# Patient Record
Sex: MALE | Race: BLACK OR AFRICAN AMERICAN | Employment: OTHER | URBAN - METROPOLITAN AREA
[De-identification: names, ages, dates, MRNs, and addresses within clinical notes are randomized per-mention and may not be internally consistent; named-entity substitution may affect disease eponyms.]

---

## 2017-01-17 ENCOUNTER — PATIENT OUTREACH (OUTPATIENT)
Dept: FAMILY MEDICINE CLINIC | Facility: CLINIC | Age: 42
End: 2017-01-17

## 2017-01-17 NOTE — PROGRESS NOTES
Patient was admitted to [facility] Emergency Department on 12/6/16 -12/6/16 for blood in urine. Contacted patient for hospital follow up. Introduced self, role and reason for call. Verified 2 patient identifiers (Name and Date of Birth). Noted no hospitalization  admission post 30 days from discharge date above. This episode is closed. ED/Hospital episode resolved. Patient stated that he is doing good. No S/S of bleeding per patient. Patient declined follow up appointment. Patient voiced no concern, needs, and/or assistance at this time. Opportunity to ask questions was provided. Contact information was provided for future reference, assistance, concerns, or further questions.

## 2017-02-02 ENCOUNTER — HOSPITAL ENCOUNTER (OUTPATIENT)
Dept: LAB | Age: 42
Discharge: HOME | End: 2017-02-02

## 2017-02-02 LAB — SENTARA SPECIMEN COL,SENBCF: NORMAL

## 2017-02-02 PROCEDURE — 99001 SPECIMEN HANDLING PT-LAB: CPT | Performed by: INTERNAL MEDICINE

## 2017-03-13 VITALS
TEMPERATURE: 98.6 F | WEIGHT: 250 LBS | BODY MASS INDEX: 35.87 KG/M2 | RESPIRATION RATE: 17 BRPM | HEART RATE: 94 BPM | OXYGEN SATURATION: 96 % | SYSTOLIC BLOOD PRESSURE: 166 MMHG | DIASTOLIC BLOOD PRESSURE: 107 MMHG

## 2017-03-13 PROCEDURE — 99283 EMERGENCY DEPT VISIT LOW MDM: CPT

## 2017-03-14 ENCOUNTER — HOSPITAL ENCOUNTER (EMERGENCY)
Age: 42
Discharge: HOME | End: 2017-03-14
Attending: EMERGENCY MEDICINE
Payer: MEDICAID

## 2017-03-14 DIAGNOSIS — R03.0 ELEVATED BLOOD PRESSURE READING WITHOUT DIAGNOSIS OF HYPERTENSION: ICD-10-CM

## 2017-03-14 DIAGNOSIS — R51.9 LEFT FACIAL PAIN: Primary | ICD-10-CM

## 2017-03-14 DIAGNOSIS — K08.89 PAIN, DENTAL: ICD-10-CM

## 2017-03-14 PROCEDURE — 74011250637 HC RX REV CODE- 250/637: Performed by: EMERGENCY MEDICINE

## 2017-03-14 RX ORDER — CLINDAMYCIN HYDROCHLORIDE 300 MG/1
300 CAPSULE ORAL 4 TIMES DAILY
Qty: 28 CAP | Refills: 0 | Status: SHIPPED | OUTPATIENT
Start: 2017-03-14 | End: 2017-03-21

## 2017-03-14 RX ORDER — IBUPROFEN 400 MG/1
800 TABLET ORAL
Status: COMPLETED | OUTPATIENT
Start: 2017-03-14 | End: 2017-03-14

## 2017-03-14 RX ORDER — ACETAMINOPHEN 500 MG
1000 TABLET ORAL
Qty: 50 TAB | Refills: 0 | Status: SHIPPED | OUTPATIENT
Start: 2017-03-14 | End: 2019-03-24

## 2017-03-14 RX ORDER — ACETAMINOPHEN 500 MG
1000 TABLET ORAL
Status: COMPLETED | OUTPATIENT
Start: 2017-03-14 | End: 2017-03-14

## 2017-03-14 RX ORDER — IBUPROFEN 800 MG/1
800 TABLET ORAL
Qty: 30 TAB | Refills: 0 | Status: SHIPPED | OUTPATIENT
Start: 2017-03-14 | End: 2017-03-19

## 2017-03-14 RX ORDER — CLINDAMYCIN HYDROCHLORIDE 150 MG/1
300 CAPSULE ORAL
Status: COMPLETED | OUTPATIENT
Start: 2017-03-14 | End: 2017-03-14

## 2017-03-14 RX ADMIN — ACETAMINOPHEN 1000 MG: 500 TABLET ORAL at 01:51

## 2017-03-14 RX ADMIN — CLINDAMYCIN HYDROCHLORIDE 300 MG: 150 CAPSULE ORAL at 01:51

## 2017-03-14 RX ADMIN — IBUPROFEN 800 MG: 400 TABLET, FILM COATED ORAL at 01:51

## 2017-03-14 NOTE — ED PROVIDER NOTES
HPI Comments: Ministerio Burnett is a 39 y.o. Male with c/o left sided lower dental pain for last day. No swelling, diff swallowing. Drinking cold water makes it better. No fcs. Constant throbbing. Still taking methadone. The history is provided by the patient. Past Medical History:   Diagnosis Date    Anxiety 10/11/2016    Continuous nicotine dependence 10/11/2016    Depression     Headache     [address] C w/o coma, chronic ([facility] [address] 10/11/2016    Hepatitis C     Insomnia 10/11/2016    Obesity, Class II, BMI 35-39.9 ([facility] [address] 10/11/2016    Paranoid schizophrenia ([facility] [address]     Psychotic disorder     [facility] 10/11/2016       Past Surgical History:   Procedure Laterality Date    HX APPENDECTOMY  2000         Family History:   Problem Relation Age of Onset    Cancer Mother 36     colon    Cancer Maternal Aunt      breast    Diabetes Father        Social History     Social History    Marital status: LEGALLY      Spouse name: N/A    Number of children: N/A    Years of education: N/A     Occupational History    Not on file. Social History Main Topics    Smoking status: Current Every Day Smoker     Packs/day: 0.00     Years: 10.00    Smokeless tobacco: Never Used      Comment: vapor (18mg nicotine)    Alcohol use No    Drug use: No      Comment: [facility]    Sexual activity: Yes     Partners: Female     Other Topics Concern    Not on file     Social History Narrative         ALLERGIES: Pcn [penicillins]    Review of Systems   Constitutional: Negative for fever. HENT: Positive for dental problem. Negative for sneezing, sore throat and trouble swallowing. Respiratory: Negative for cough. Cardiovascular: Negative for chest pain. Gastrointestinal: Negative for abdominal pain. Musculoskeletal: Negative for gait problem. Skin: Negative for color change. Neurological: Negative for syncope. Psychiatric/Behavioral: Positive for sleep disturbance. Vitals:    03/13/17 2304   BP: (!) 166/107   Pulse: 94   Resp: 17   Temp: 98.6 °F (37 °C)   SpO2: 96%   Weight: 113.4 kg (250 lb)            Physical Exam   Constitutional: He is oriented to person, place, and time. Non-toxic appearance. He does not appear ill. He appears distressed (appears mildly uncomfortable). HENT:   Head: Normocephalic and atraumatic. Right Ear: External ear normal.   Left Ear: External ear normal.   Nose: Nose normal.   Mouth/Throat: Uvula is midline, oropharynx is clear and moist and mucous membranes are normal. No oral lesions. Abnormal dentition. Dental abscesses and dental caries present. No uvula swelling. No oropharyngeal exudate, posterior oropharyngeal edema, posterior oropharyngeal erythema or tonsillar abscesses. Eyes: Conjunctivae are normal.   Neck: Normal range of motion. Cardiovascular: Normal rate, regular rhythm, normal heart sounds and intact distal pulses. Pulmonary/Chest: Effort normal and breath sounds normal. No respiratory distress. Abdominal: Soft. There is no tenderness. Musculoskeletal: Normal range of motion. He exhibits no edema. Neurological: He is alert and oriented to person, place, and time. Skin: Skin is warm and dry. He is not diaphoretic. Psychiatric: His behavior is normal.   Nursing note and vitals reviewed. [facility]  ED Course       Procedures  Vitals:  Patient Vitals for the past 12 hrs:   Temp Pulse Resp BP SpO2   03/13/17 2304 98.6 °F (37 °C) 94 17 (!) 166/107 96 %         Medications ordered:   Medications   ibuprofen (MOTRIN) tablet 800 mg (not administered)   acetaminophen (TYLENOL) tablet 1,000 mg (not administered)   clindamycin (CLEOCIN) capsule 300 mg (not administered)         Lab findings:  No results found for this or any previous visit (from the past 12 hour(s)).     EKG interpretation by ED Physician:      X-Ray, CT or other radiology findings or impressions:  No orders to display       Progress notes, Consult notes or additional Procedure notes:   D/w pt rec treatment plan. C/w dental related pain. Reevaluation of patient:   Stable for d/c     Disposition:  Diagnosis:   1. Left facial pain    2. Pain, dental    3. Elevated blood pressure reading without diagnosis of hypertension        Disposition: home    Follow-up Information     Follow up With Details Comments Ellie Riley MD Schedule an appointment as soon as possible for a visit for follow up on your blood pressure [facility] [phone]  [phone]      [address] [place] [address] Schedule an appointment as soon as possible for a visit  [address]  [phone]            Patient's Medications   Start Taking    ACETAMINOPHEN (TYLENOL EXTRA STRENGTH) 500 MG TABLET    Take 2 Tabs by mouth every six (6) hours as needed for Pain. CLINDAMYCIN (CLEOCIN) 300 MG CAPSULE    Take 1 Cap by mouth four (4) times daily for 7 days. IBUPROFEN (MOTRIN) 800 MG TABLET    Take 1 Tab by mouth every eight (8) hours as needed for Pain (with food). Continue Taking    ARIPIPRAZOLE (ABILIFY) 5 MG TABLET    Take 10 mg by mouth daily. BENZTROPINE (COGENTIN) 1 MG TABLET    Take 1 mg by mouth two (2) times a day. METHADONE (DOLOPHINE) 10 MG TABLET    Take 60 mg by mouth daily. SERTRALINE (ZOLOFT) 50 MG TABLET    Take 50 mg by mouth daily.    These Medications have changed    No medications on file   Stop Taking    No medications on file

## 2017-03-18 ENCOUNTER — HOSPITAL ENCOUNTER (EMERGENCY)
Age: 42
Discharge: HOME | End: 2017-03-19
Attending: EMERGENCY MEDICINE
Payer: MEDICAID

## 2017-03-18 DIAGNOSIS — B34.9 VIRAL ILLNESS: ICD-10-CM

## 2017-03-18 DIAGNOSIS — R50.9 FEVER, UNSPECIFIED FEVER CAUSE: Primary | ICD-10-CM

## 2017-03-18 PROCEDURE — 99283 EMERGENCY DEPT VISIT LOW MDM: CPT

## 2017-03-18 PROCEDURE — 96372 THER/PROPH/DIAG INJ SC/IM: CPT

## 2017-03-19 ENCOUNTER — APPOINTMENT (OUTPATIENT)
Dept: GENERAL RADIOLOGY | Age: 42
End: 2017-03-19
Attending: EMERGENCY MEDICINE
Payer: MEDICAID

## 2017-03-19 VITALS
WEIGHT: 250 LBS | SYSTOLIC BLOOD PRESSURE: 110 MMHG | HEART RATE: 99 BPM | DIASTOLIC BLOOD PRESSURE: 58 MMHG | TEMPERATURE: 102.9 F | OXYGEN SATURATION: 97 % | RESPIRATION RATE: 14 BRPM | BODY MASS INDEX: 35.87 KG/M2

## 2017-03-19 LAB
ANION GAP BLD CALC-SCNC: 8 MMOL/L (ref 3–18)
BASOPHILS # BLD AUTO: 0 K/UL (ref 0–0.06)
BASOPHILS # BLD: 0 % (ref 0–2)
BUN SERPL-MCNC: 12 MG/DL (ref 7–18)
BUN/CREAT SERPL: 10 (ref 12–20)
CALCIUM SERPL-MCNC: 8.2 MG/DL (ref 8.5–10.1)
CHLORIDE SERPL-SCNC: 101 MMOL/L (ref 100–108)
CO2 SERPL-SCNC: 27 MMOL/L (ref 21–32)
CREAT SERPL-MCNC: 1.25 MG/DL (ref 0.6–1.3)
DIFFERENTIAL METHOD BLD: ABNORMAL
EOSINOPHIL # BLD: 0 K/UL (ref 0–0.4)
EOSINOPHIL NFR BLD: 0 % (ref 0–5)
ERYTHROCYTE [DISTWIDTH] IN BLOOD BY AUTOMATED COUNT: 14 % (ref 11.6–14.5)
FLUAV AG NPH QL IA: NEGATIVE
FLUBV AG NOSE QL IA: NEGATIVE
GLUCOSE SERPL-MCNC: 105 MG/DL (ref 74–99)
HCT VFR BLD AUTO: 35.5 % (ref 36–48)
HGB BLD-MCNC: 11.7 G/DL (ref 13–16)
LYMPHOCYTES # BLD AUTO: 10 % (ref 21–52)
LYMPHOCYTES # BLD: 0.9 K/UL (ref 0.9–3.6)
MCH RBC QN AUTO: 25.5 PG (ref 24–34)
MCHC RBC AUTO-ENTMCNC: 33 G/DL (ref 31–37)
MCV RBC AUTO: 77.5 FL (ref 74–97)
MONOCYTES # BLD: 0.9 K/UL (ref 0.05–1.2)
MONOCYTES NFR BLD AUTO: 11 % (ref 3–10)
NEUTS SEG # BLD: 7 K/UL (ref 1.8–8)
NEUTS SEG NFR BLD AUTO: 79 % (ref 40–73)
PLATELET # BLD AUTO: 167 K/UL (ref 135–420)
PMV BLD AUTO: 9.1 FL (ref 9.2–11.8)
POTASSIUM SERPL-SCNC: 3.7 MMOL/L (ref 3.5–5.5)
RBC # BLD AUTO: 4.58 M/UL (ref 4.7–5.5)
SODIUM SERPL-SCNC: 136 MMOL/L (ref 136–145)
WBC # BLD AUTO: 8.8 K/UL (ref 4.6–13.2)

## 2017-03-19 PROCEDURE — 87804 INFLUENZA ASSAY W/OPTIC: CPT | Performed by: EMERGENCY MEDICINE

## 2017-03-19 PROCEDURE — 80048 BASIC METABOLIC PNL TOTAL CA: CPT | Performed by: EMERGENCY MEDICINE

## 2017-03-19 PROCEDURE — 85025 COMPLETE CBC W/AUTO DIFF WBC: CPT | Performed by: EMERGENCY MEDICINE

## 2017-03-19 PROCEDURE — 74011250636 HC RX REV CODE- 250/636: Performed by: EMERGENCY MEDICINE

## 2017-03-19 PROCEDURE — 74011250637 HC RX REV CODE- 250/637: Performed by: EMERGENCY MEDICINE

## 2017-03-19 PROCEDURE — 71010 XR CHEST PORT: CPT

## 2017-03-19 RX ORDER — KETOROLAC TROMETHAMINE 30 MG/ML
60 INJECTION, SOLUTION INTRAMUSCULAR; INTRAVENOUS
Status: COMPLETED | OUTPATIENT
Start: 2017-03-19 | End: 2017-03-19

## 2017-03-19 RX ORDER — ACETAMINOPHEN 500 MG
1000 TABLET ORAL
Status: COMPLETED | OUTPATIENT
Start: 2017-03-19 | End: 2017-03-19

## 2017-03-19 RX ORDER — KETOROLAC TROMETHAMINE 10 MG/1
10 TABLET, FILM COATED ORAL EVERY 8 HOURS
Qty: 15 TAB | Refills: 0 | Status: SHIPPED | OUTPATIENT
Start: 2017-03-19 | End: 2017-03-24

## 2017-03-19 RX ADMIN — KETOROLAC TROMETHAMINE 60 MG: 30 INJECTION, SOLUTION INTRAMUSCULAR at 01:17

## 2017-03-19 RX ADMIN — ACETAMINOPHEN 1000 MG: 500 TABLET ORAL at 01:17

## 2017-03-19 NOTE — ED NOTES
I have reviewed discharge instructions with the patient. The patient verbalized understanding. Patient discharged without removing armband.  Informed of privacy risks if armband lost or stolen

## 2017-03-19 NOTE — DISCHARGE INSTRUCTIONS
Viral Infections: Care Instructions  Your Care Instructions  You don't feel well, but it's not clear what's causing it. You may have a viral infection. Viruses cause many illnesses, such as the common cold, influenza, fever, rashes, and the diarrhea, nausea, and vomiting that are often called \"stomach flu. \" You may wonder if antibiotic medicines could make you feel better. But antibiotics only treat infections caused by bacteria. They don't work on viruses. The good news is that viral infections usually aren't serious. Most will go away in a few days without medical treatment. In the meantime, there are a few things you can do to make yourself more comfortable. Follow-up care is a key part of your treatment and safety. Be sure to make and go to all appointments, and call your doctor if you are having problems. It's also a good idea to know your test results and keep a list of the medicines you take. How can you care for yourself at home? · Get plenty of rest if you feel tired. · Take an over-the-counter pain medicine if needed, such as acetaminophen (Tylenol), ibuprofen (Advil, Motrin), or naproxen (Aleve). Read and follow all instructions on the label. · Be careful when taking over-the-counter cold or flu medicines and Tylenol at the same time. Many of these medicines have acetaminophen, which is Tylenol. Read the labels to make sure that you are not taking more than the recommended dose. Too much acetaminophen (Tylenol) can be harmful. · Drink plenty of fluids, enough so that your urine is light yellow or clear like water. If you have kidney, heart, or liver disease and have to limit fluids, talk with your doctor before you increase the amount of fluids you drink. · Stay home from work, school, and other public places while you have a fever. When should you call for help? Call 911 anytime you think you may need emergency care. For example, call if:  · You have severe trouble breathing.   · You passed out (lost consciousness). Call your doctor now or seek immediate medical care if:  · You seem to be getting much sicker. · You have a new or higher fever. · You have blood in your stools. · You have new belly pain, or your pain gets worse. · You have a new rash. Watch closely for changes in your health, and be sure to contact your doctor if:  · You start to get better and then get worse. · You do not get better as expected. Where can you learn more? Go to http://laina-taylor.info/. Enter I912 in the search box to learn more about \"Viral Infections: Care Instructions. \"  Current as of: May 24, 2016  Content Version: 11.1  © 2006-2016 [employer]. Care instructions adapted under license by [employer] (which disclaims liability or warranty for this information). If you have questions about a medical condition or this instruction, always ask your healthcare professional. [place] [address] any warranty or liability for your use of this information. Learning About Fever  What is a fever? A fever is a high body temperature. It's one way your body fights being sick. A fever shows that the body is responding to infection or other illnesses, both minor and severe. A fever is a symptom, not an illness by itself. A fever can be a sign that you are ill, but most fevers are not caused by a serious problem. You may have a fever with a minor illness, such as a cold. But sometimes a very serious infection may cause little or no fever. It is important to look at other symptoms, other conditions you have, and how you feel in general. In children, notice how they act and see what symptoms they complain of. What is a normal body temperature? A normal body temperature is about 98. 6ºF. Some people have a normal temperature that is a little higher or a little lower than this.   Your temperature may be a little lower in the morning than it is later in the day. It may go up during hot weather or when you exercise, wear heavy clothes, or take a hot bath. Your temperature may also be different depending on how you take it. A temperature taken in the mouth (oral) or under the arm may be a little lower than your core temperature (rectal). What is a fever temperature? A core temperature of 100.4°F or above is considered a fever. What can cause a fever? A fever may be caused by:  · Infections. This is the most common cause of a fever. Examples of infections that can cause a fever include the flu, a kidney infection, or pneumonia. · Some medicines. · Severe trauma or injury, such as a heart attack, stroke, heatstroke, or burns. · Other medical conditions, such as arthritis and some cancers. How can you treat a fever at home? · Ask your doctor if you can take an over-the-counter pain medicine, such as acetaminophen (Tylenol), ibuprofen (Advil, Motrin), or naproxen (Aleve). Be safe with medicines. Read and follow all instructions on the label. · To prevent dehydration, drink plenty of fluids. Choose water and other caffeine-free clear liquids until you feel better. If you have kidney, heart, or liver disease and have to limit fluids, talk with your doctor before you increase the amount of fluids you drink. Follow-up care is a key part of your treatment and safety. Be sure to make and go to all appointments, and call your doctor if you are having problems. It's also a good idea to know your test results and keep a list of the medicines you take. Where can you learn more? Go to http://laina-taylor.info/. Enter J757 in the search box to learn more about \"Learning About Fever. \"  Current as of: May 27, 2016  Content Version: 11.1  © 5726-7897 [employer]. Care instructions adapted under license by [employer] (which disclaims liability or warranty for this information).  If you have questions about a medical condition or this instruction, always ask your healthcare professional. [place] [address] any warranty or liability for your use of this information.

## 2017-03-19 NOTE — ED PROVIDER NOTES
HPI Comments: 11:42 PM Nyla Singh is a 39 y.o. male with a history of Hep C, schizophrenia, and depression presenting to the ED with generalized myalgias that began a few days ago. The patient also reports cough, fever, headache, and rhinorrhea as associated symptoms. Pt denies nausea, vomiting, and diarrhea. He is currently taking antibiotics that he was prescribed four days ago for a dental abscess. No other complaints at this time. Patient is a 39 y.o. male presenting with general illness. The history is provided by the patient. Generalized Body Aches   Associated symptoms include headaches. Pertinent negatives include no chest pain, no abdominal pain and no shortness of breath. Past Medical History:   Diagnosis Date    Anxiety 10/11/2016    Continuous nicotine dependence 10/11/2016    Depression     Headache     [facility] C w/o coma, chronic ([facility] [address] 10/11/2016    Hepatitis C     Insomnia 10/11/2016    Obesity, Class II, BMI 35-39.9 ([facility] [address] 10/11/2016    Paranoid schizophrenia ([facility] [facility]     Psychotic disorder     Schizophrenia ([facility] [facility] 10/11/2016       Past Surgical History:   Procedure Laterality Date    HX APPENDECTOMY  2000         Family History:   Problem Relation Age of Onset    Cancer Mother 36     colon    Cancer Maternal Aunt      breast    Diabetes Father        Social History     Social History    Marital status: LEGALLY      Spouse name: N/A    Number of children: N/A    Years of education: N/A     Occupational History    Not on file.      Social History Main Topics    Smoking status: Current Every Day Smoker     Packs/day: 0.00     Years: 10.00    Smokeless tobacco: Never Used      Comment: vapor (18mg nicotine)    Alcohol use No    Drug use: No      Comment: [facility]: [facility]    Sexual activity: Yes     Partners: Female     Other Topics Concern    Not on file     Social History Narrative         ALLERGIES: Pcn [penicillins]    Review of Systems   Constitutional: Positive for fever. Negative for appetite change, chills, diaphoresis, fatigue and unexpected weight change. HENT: Positive for rhinorrhea. Negative for congestion, dental problem, ear discharge, ear pain, hearing loss, nosebleeds, sinus pressure, sore throat, tinnitus, trouble swallowing and voice change. Eyes: Negative for photophobia, pain, discharge, redness and visual disturbance. Respiratory: Positive for cough. Negative for choking, chest tightness, shortness of breath, wheezing and stridor. Cardiovascular: Negative for chest pain, palpitations and leg swelling. Gastrointestinal: Negative for abdominal distention, abdominal pain, anal bleeding, blood in stool, constipation, diarrhea, nausea and vomiting. Genitourinary: Negative for decreased urine volume, difficulty urinating, discharge, dysuria, flank pain, frequency, genital sores, hematuria, penile pain, penile swelling, scrotal swelling, testicular pain and urgency. Musculoskeletal: Positive for myalgias. Negative for neck pain and neck stiffness. Neurological: Positive for headaches. Negative for tremors, seizures, syncope, speech difficulty, weakness and light-headedness. Hematological: Negative for adenopathy. Does not bruise/bleed easily. Psychiatric/Behavioral: Negative for agitation, behavioral problems, confusion, hallucinations, self-injury, sleep disturbance and suicidal ideas. The patient is not nervous/anxious and is not hyperactive. Vitals:    03/19/17 0024 03/19/17 0025 03/19/17 0045 03/19/17 0100   BP: 103/60  109/72 124/60   Pulse: 100 (!) 107 (!) 103 (!) 106   Resp:  16 15 13   Temp:       SpO2:  96% 93% 91%   Weight:                Physical Exam   Constitutional: He is oriented to person, place, and time. He appears well-developed and well-nourished. No distress. 39year old  male in moderate distress due to myalgias, and fever.    HENT:   Head: Normocephalic and atraumatic. Right Ear: External ear normal.   Left Ear: External ear normal.   Nose: Nose normal.   Mouth/Throat: Oropharynx is clear and moist. No oropharyngeal exudate. Eyes: Conjunctivae and EOM are normal. Pupils are equal, round, and reactive to light. Right eye exhibits no discharge. Left eye exhibits no discharge. No scleral icterus. Neck: Normal range of motion. Neck supple. No JVD present. No tracheal deviation present. No thyromegaly present. Cardiovascular: Regular rhythm, normal heart sounds and intact distal pulses. Exam reveals no gallop and no friction rub. No murmur heard. Tachycardia, S1/S2   Pulmonary/Chest: Effort normal and breath sounds normal. No stridor. No respiratory distress. He has no wheezes. He has no rales. He exhibits no tenderness. Abdominal: Soft. Bowel sounds are normal. He exhibits no distension and no mass. There is no tenderness. There is no rebound and no guarding. Musculoskeletal: Normal range of motion. He exhibits no edema or tenderness. Lymphadenopathy:     He has no cervical adenopathy. Neurological: He is alert and oriented to person, place, and time. No cranial nerve deficit. He exhibits normal muscle tone. Coordination normal.   Skin: Skin is warm and dry. No rash noted. He is not diaphoretic. No erythema. No pallor. Psychiatric: He has a normal mood and affect. His behavior is normal. Judgment and thought content normal.   Nursing note and vitals reviewed.        MDM  Number of Diagnoses or Management Options  Fever, unspecified fever cause:   Viral illness:   Diagnosis management comments: Differential includes: Influenza, Viral illness, Pneumonia, Pharyngitis       Amount and/or Complexity of Data Reviewed  Clinical lab tests: reviewed and ordered  Tests in the radiology section of CPT®: ordered and reviewed  Tests in the medicine section of CPT®: reviewed and ordered  Decide to obtain previous medical records or to obtain history from someone other than the patient: yes  Review and summarize past medical records: yes  Independent visualization of images, tracings, or specimens: yes    Risk of Complications, Morbidity, and/or Mortality  Presenting problems: moderate  Diagnostic procedures: moderate  Management options: moderate    Patient Progress  Patient progress: stable    ED Course       Procedures    -------------------------------------------------------------------------------------------------------------------  PROGRESS NOTE:  2:07 AM Upon re-evaluation the patient's symptoms have improved. Pt has non-toxic appearance and condition is stable for discharge. He was informed of his results, instructed to f/u with his PCP and return to the ED upon worsening of symptoms. All questions and concerns were addressed.         ORDERS:  Orders Placed This Encounter    INFLUENZA A & B AG (RAPID TEST)    STREP THROAT SCREEN    XR CHEST PORT    CBC WITH AUTOMATED DIFF    METABOLIC PANEL, BASIC    acetaminophen (TYLENOL) tablet 1,000 mg    ketorolac tromethamine (TORADOL) 60 mg/2 mL injection 60 mg    ketorolac (TORADOL) 10 mg tablet     RADIOLOGY:  XR CHEST PORT      No acute process  Per Butch Fisher, DO             LAB RESULTS:    Recent Results (from the past 12 hour(s))   INFLUENZA A & B AG (RAPID TEST)    Collection Time: 03/19/17 12:20 AM   Result Value Ref Range    Influenza A Antigen NEGATIVE  NEG      Influenza B Antigen NEGATIVE  NEG     CBC WITH AUTOMATED DIFF    Collection Time: 03/19/17  1:12 AM   Result Value Ref Range    WBC 8.8 4.6 - 13.2 K/uL    RBC 4.58 (L) 4.70 - 5.50 M/uL    HGB 11.7 (L) 13.0 - 16.0 g/dL    HCT 35.5 (L) 36.0 - 48.0 %    MCV 77.5 74.0 - 97.0 FL    MCH 25.5 24.0 - 34.0 PG    MCHC 33.0 31.0 - 37.0 g/dL    RDW 14.0 11.6 - 14.5 %    PLATELET 731 840 - 655 K/uL    MPV 9.1 (L) 9.2 - 11.8 FL    NEUTROPHILS 79 (H) 40 - 73 %    LYMPHOCYTES 10 (L) 21 - 52 %    MONOCYTES 11 (H) 3 - 10 %    EOSINOPHILS 0 0 - 5 %    BASOPHILS 0 0 - 2 %    ABS. NEUTROPHILS 7.0 1.8 - 8.0 K/UL    ABS. LYMPHOCYTES 0.9 0.9 - 3.6 K/UL    ABS. MONOCYTES 0.9 0.05 - 1.2 K/UL    ABS. EOSINOPHILS 0.0 0.0 - 0.4 K/UL    ABS. BASOPHILS 0.0 0.0 - 0.06 K/UL    DF AUTOMATED     METABOLIC PANEL, BASIC    Collection Time: 03/19/17  1:12 AM   Result Value Ref Range    Sodium 136 136 - 145 mmol/L    Potassium 3.7 3.5 - 5.5 mmol/L    Chloride 101 100 - 108 mmol/L    CO2 27 21 - 32 mmol/L    Anion gap 8 3.0 - 18 mmol/L    Glucose 105 (H) 74 - 99 mg/dL    BUN 12 7.0 - 18 MG/DL    Creatinine 1.25 0.6 - 1.3 MG/DL    BUN/Creatinine ratio 10 (L) 12 - 20      GFR est AA >60 >60 ml/min/1.73m2    GFR est non-AA >60 >60 ml/min/1.73m2    Calcium 8.2 (L) 8.5 - 10.1 MG/DL         DISPOSITION:  Diagnosis:   1. Fever, unspecified fever cause    2. Viral illness            Disposition: discharged      Follow-up Information     Follow up With Details Comments Ellie Riley MD Schedule an appointment as soon as possible for a visit Return to ED, If symptoms worsen [facility] [phone]  [phone]                   Patient's Medications   Start Taking    KETOROLAC (TORADOL) 10 MG TABLET    Take 1 Tab by mouth every eight (8) hours for 5 days. Continue Taking    ACETAMINOPHEN (TYLENOL EXTRA STRENGTH) 500 MG TABLET    Take 2 Tabs by mouth every six (6) hours as needed for Pain. ARIPIPRAZOLE (ABILIFY) 5 MG TABLET    Take 10 mg by mouth daily. BENZTROPINE (COGENTIN) 1 MG TABLET    Take 1 mg by mouth two (2) times a day. CLINDAMYCIN (CLEOCIN) 300 MG CAPSULE    Take 1 Cap by mouth four (4) times daily for 7 days. METHADONE (DOLOPHINE) 10 MG TABLET    Take 60 mg by mouth daily. SERTRALINE (ZOLOFT) 50 MG TABLET    Take 50 mg by mouth daily.    These Medications have changed    No medications on file   Stop Taking    IBUPROFEN (MOTRIN) 800 MG TABLET    Take 1 Tab by mouth every eight (8) hours as needed for Pain (with food). -------------------------------------------------------------------------------------------------------------------  Scribe Attestation:  I, Spenser Price, am scribing for and in the presence of Mayra Osullivan DO. Signed by: Preston Hopson, 03/19/17, 11:47 PM      Provider Attestation:  I personally performed the services described in the documentation, reviewed the documentation, as recorded by the scribe in my presence, and it accurately and completely records my words and actions. Dr. Wilmer Plummer.  Danny MEMBRENO 11:47 PM

## 2017-03-29 ENCOUNTER — ANESTHESIA EVENT (OUTPATIENT)
Dept: ENDOSCOPY | Age: 42
End: 2017-03-29
Payer: MEDICAID

## 2017-03-30 ENCOUNTER — ANESTHESIA (OUTPATIENT)
Dept: ENDOSCOPY | Age: 42
End: 2017-03-30
Payer: MEDICAID

## 2017-03-30 ENCOUNTER — HOSPITAL ENCOUNTER (OUTPATIENT)
Age: 42
Setting detail: OUTPATIENT SURGERY
Discharge: HOME | End: 2017-03-30
Attending: INTERNAL MEDICINE | Admitting: INTERNAL MEDICINE
Payer: MEDICAID

## 2017-03-30 ENCOUNTER — SURGERY (OUTPATIENT)
Age: 42
End: 2017-03-30

## 2017-03-30 VITALS
HEIGHT: 70 IN | WEIGHT: 250 LBS | RESPIRATION RATE: 12 BRPM | DIASTOLIC BLOOD PRESSURE: 71 MMHG | HEART RATE: 87 BPM | OXYGEN SATURATION: 100 % | SYSTOLIC BLOOD PRESSURE: 110 MMHG | TEMPERATURE: 98.2 F | BODY MASS INDEX: 35.79 KG/M2

## 2017-03-30 PROCEDURE — 76060000031 HC ANESTHESIA FIRST 0.5 HR: Performed by: INTERNAL MEDICINE

## 2017-03-30 PROCEDURE — 74011250636 HC RX REV CODE- 250/636: Performed by: NURSE ANESTHETIST, CERTIFIED REGISTERED

## 2017-03-30 PROCEDURE — 76040000019: Performed by: INTERNAL MEDICINE

## 2017-03-30 PROCEDURE — 74011000250 HC RX REV CODE- 250: Performed by: NURSE ANESTHETIST, CERTIFIED REGISTERED

## 2017-03-30 PROCEDURE — 74011250636 HC RX REV CODE- 250/636

## 2017-03-30 RX ORDER — SODIUM CHLORIDE, SODIUM LACTATE, POTASSIUM CHLORIDE, CALCIUM CHLORIDE 600; 310; 30; 20 MG/100ML; MG/100ML; MG/100ML; MG/100ML
75 INJECTION, SOLUTION INTRAVENOUS CONTINUOUS
Status: DISCONTINUED | OUTPATIENT
Start: 2017-03-30 | End: 2017-03-30 | Stop reason: HOSPADM

## 2017-03-30 RX ORDER — LIDOCAINE HYDROCHLORIDE 10 MG/ML
0.1 INJECTION, SOLUTION EPIDURAL; INFILTRATION; INTRACAUDAL; PERINEURAL AS NEEDED
Status: DISCONTINUED | OUTPATIENT
Start: 2017-03-30 | End: 2017-03-30 | Stop reason: HOSPADM

## 2017-03-30 RX ORDER — FAMOTIDINE 10 MG/ML
20 INJECTION INTRAVENOUS ONCE
Status: COMPLETED | OUTPATIENT
Start: 2017-03-30 | End: 2017-03-30

## 2017-03-30 RX ORDER — SODIUM CHLORIDE 0.9 % (FLUSH) 0.9 %
5-10 SYRINGE (ML) INJECTION AS NEEDED
Status: DISCONTINUED | OUTPATIENT
Start: 2017-03-30 | End: 2017-03-30 | Stop reason: HOSPADM

## 2017-03-30 RX ORDER — SODIUM CHLORIDE 0.9 % (FLUSH) 0.9 %
5-10 SYRINGE (ML) INJECTION EVERY 8 HOURS
Status: DISCONTINUED | OUTPATIENT
Start: 2017-03-30 | End: 2017-03-30 | Stop reason: HOSPADM

## 2017-03-30 RX ORDER — PROPOFOL 10 MG/ML
INJECTION, EMULSION INTRAVENOUS AS NEEDED
Status: DISCONTINUED | OUTPATIENT
Start: 2017-03-30 | End: 2017-03-30 | Stop reason: HOSPADM

## 2017-03-30 RX ORDER — INSULIN LISPRO 100 [IU]/ML
INJECTION, SOLUTION INTRAVENOUS; SUBCUTANEOUS ONCE
Status: DISCONTINUED | OUTPATIENT
Start: 2017-03-30 | End: 2017-03-30 | Stop reason: HOSPADM

## 2017-03-30 RX ADMIN — FAMOTIDINE 20 MG: 10 INJECTION, SOLUTION INTRAVENOUS at 10:13

## 2017-03-30 RX ADMIN — PROPOFOL 100 MG: 10 INJECTION, EMULSION INTRAVENOUS at 11:35

## 2017-03-30 RX ADMIN — PROPOFOL 50 MG: 10 INJECTION, EMULSION INTRAVENOUS at 11:37

## 2017-03-30 NOTE — DISCHARGE INSTRUCTIONS
DISCHARGE SUMMARY from Nurse     POST-PROCEDURE INSTRUCTIONS:    Call your Physician if you:  ? Observe any excess bleeding. ? Develop a temperature over 100.5o F.  ? Experience abdominal, shoulder or chest pain. ? Notice any signs of decreased circulation or nerve impairment to an extremity such as a change in color, persistent numbness, tingling, coldness or increase in pain. ? Vomit blood or you have nausea and vomiting lasting longer than 4 hours. ? Are unable to take medications. ? Are unable to urinate within 8 hours after discharge following general anesthesia or intravenous sedation. For the next 24 hours after receiving general anesthesia or intravenous sedation, or while taking prescription Narcotics, limit your activities:  ? Do NOT drive a motor vehicle, operate hazard machinery or power tools, or perform tasks that require coordination. The medication you received during your procedure may have some effect on your mental awareness. ? Do NOT make important personal or business decisions. The medication you received during your procedure may have some effect on your mental awareness. ? Do NOT drink alcoholic beverages. These drinks do not mix well with the medications that have been given to you. ? Upon discharge from the hospital, you must be accompanied by a responsible adult. ? Resume your diet as directed by your physician. ? Resume medications as your physician has prescribed. ? Please give a list of your current medications to your Primary Care Provider. ? Please update this list whenever your medications are discontinued, doses are changed, or new medications (including over-the-counter products) are added. ? Please carry medication information at all times in case of emergency situations. These are general instructions for a healthy lifestyle:    No smoking/ No tobacco products/ Avoid exposure to second hand smoke.    Surgeon General's Warning:  Quitting smoking now greatly reduces serious risk to your health. Obesity, smoking, and a sedentary lifestyle greatly increase your risk for illness.  A healthy diet, regular physical exercise & weight monitoring are important for maintaining a healthy lifestyle   You may be retaining fluid if you have a history of heart failure or if you experience any of the following symptoms:  Weight gain of 3 pounds or more overnight or 5 pounds in a week, increased swelling in our hands or feet or shortness of breath while lying flat in bed. Please call your doctor as soon as you notice any of these symptoms; do not wait until your next office visit. Recognize signs and symptoms of STROKE:  F  -  Face looks uneven  A  -  Arms unable to move or move unevenly  S  -  Speech slurred or non-existent  T  -  Time to call 911 - as soon as signs and symptoms begin - DO NOT go back to bed or wait to see If you get better - TIME IS BRAIN. Colorectal Screening   Colorectal cancer almost always develops from precancerous polyps (abnormal growths) in the colon or rectum. Screening tests can find precancerous polyps, so that they can be removed before they turn into cancer. Screening tests can also find colorectal cancer early, when treatment works best.  [facility] Speak with your physician about when you should begin screening and how often you should be tested. Colonoscopy: What to Expect at [address] [place] [address]  After you have a colonoscopy, you will stay at the clinic for 1 to 2 hours until the medicines wear off. Then you can go home. But you will need to arrange for a ride. Your doctor will tell you when you can eat and do your other usual activities. Your doctor will talk to you about when you will need your next colonoscopy. Your doctor can help you decide how often you need to be checked. This will depend on the results of your test and your risk for colorectal cancer. After the test, you may be bloated or have gas pains.  You may need to pass gas. If a biopsy was done or a polyp was removed, you may have streaks of blood in your stool (feces) for a few days. This care sheet gives you a general idea about how long it will take for you to recover. But each person recovers at a different pace. Follow the steps below to get better as quickly as possible. How can you care for yourself at home? Activity  · Rest when you feel tired. · You can do your normal activities when it feels okay to do so. Diet  · Follow your doctor's directions for eating. · Unless your doctor has told you not to, drink plenty of fluids. This helps to replace the fluids that were lost during the colon prep. · Do not drink alcohol. Medicines  · If polyps were removed or a biopsy was done during the test, your doctor may tell you not to take aspirin or other anti-inflammatory medicines for a few days. These include ibuprofen (Advil, Motrin) and naproxen (Aleve). Other instructions  · For your safety, do not drive or operate machinery until the medicine wears off and you can think clearly. Your doctor may tell you not to drive or operate machinery until the day after your test.  · Do not sign legal documents or make major decisions until the medicine wears off and you can think clearly. The anesthesia can make it hard for you to fully understand what you are agreeing to. Follow-up care is a key part of your treatment and safety. Be sure to make and go to all appointments, and call your doctor if you are having problems. It's also a good idea to know your test results and keep a list of the medicines you take. When should you call for help? Call 911 anytime you think you may need emergency care. For example, call if:  · You passed out (lost consciousness). · You pass maroon or bloody stools. · You have severe belly pain. Call your doctor now or seek immediate medical care if:  · Your stools are black and tarlike.   · Your stools have streaks of blood, but you did not have a biopsy or any polyps removed. · You have belly pain, or your belly is swollen and firm. · You vomit. · You have a fever. · You are very dizzy. Watch closely for changes in your health, and be sure to contact your doctor if you have any problems. Where can you learn more? Go to [employer]  Enter E264 in the search box to learn more about \"Colonoscopy: What to Expect at Home. \"   © 6333-9813 Healthwise, Incorporated. Care instructions adapted under license by Digna Keane (which disclaims liability or warranty for this information). This care instruction is for use with your licensed healthcare professional. If you have questions about a medical condition or this instruction, always ask your healthcare professional. [address] any warranty or liability for your use of this information.   Content Version: 78.3.685849; Current as of: November 14, 2014

## 2017-03-30 NOTE — ANESTHESIA POSTPROCEDURE EVALUATION
Post-Anesthesia Evaluation and Assessment    Patient: Zuleyka Larios MRN: [id number]  SSN: [id number]    YOB: 1975  Age: 39 y.o. Sex: male     VS from flow sheet    Cardiovascular Function/Vital Signs  Visit Vitals    /50    Pulse 70    Temp 36.8 °C (98.2 °F)    Resp 8    Ht 5' 10\" (1.778 m)    Wt 113.4 kg (250 lb)    SpO2 98%    BMI 35.87 kg/m2       Patient is status post MAC anesthesia for Procedure(s):  COLONOSCOPY. Nausea/Vomiting: None    Postoperative hydration reviewed and adequate. Pain:  Pain Scale 1: Numeric (0 - 10) (03/30/17 1005)  Pain Intensity 1: 0 (03/30/17 1005)   Managed    Neurological Status: At baseline    Mental Status and Level of Consciousness: Arousable    Pulmonary Status:   O2 Device: Room air (03/30/17 1144)   Adequate oxygenation and airway patent    Complications related to anesthesia: None    Post-anesthesia assessment completed.  No concerns    Signed By: Sheryl Allison MD     March 30, 2017

## 2017-03-30 NOTE — ANESTHESIA PREPROCEDURE EVALUATION
Anesthetic History   No history of anesthetic complications            Review of Systems / Medical History  Patient summary reviewed and pertinent labs reviewed    Pulmonary          Smoker         Neuro/Psych   Within defined limits           Cardiovascular  Within defined limits                     GI/Hepatic/Renal           Liver disease     Endo/Other        Morbid obesity     Other Findings   Comments:   Risk Factors for Postoperative nausea/vomiting:       History of postoperative nausea/vomiting? NO       Female? NO       Motion sickness? NO       Intended opioid administration for postoperative analgesia?   NO           Physical Exam    Airway  Mallampati: II  TM Distance: 4 - 6 cm  Neck ROM: normal range of motion   Mouth opening: Normal     Cardiovascular    Rhythm: irregular  Rate: normal         Dental    Dentition: Poor dentition     Pulmonary  Breath sounds clear to auscultation               Abdominal  GI exam deferred       Other Findings            Anesthetic Plan    ASA: 3  Anesthesia type: MAC            Anesthetic plan and risks discussed with: Patient

## 2017-03-30 NOTE — IP AVS SNAPSHOT
[facility] 
 
 
 [address] [address] [address] [address] [phone] Patient: Griffin Cameron MRN: [id number] XMJ:[id number] You are allergic to the following Allergen Reactions Pcn (Penicillins) Hives Recent Documentation Height Weight BMI Smoking Status 1.778 m 113.4 kg 35.87 kg/m2 Current Every Day Smoker Emergency Contacts Name Discharge Info Relation Home Work Mobile [address] CAREGIVER [3] Sister [23] [phone] About your hospitalization You were admitted on:  March 30, 2017 You last received care in the:  [facility] You were discharged on:  March 30, 2017 Unit phone number:  [phone] Why you were hospitalized Your primary diagnosis was:  Not on File Providers Seen During Your Hospitalizations Provider Role Specialty Primary office phone Aissatou Farias MD Attending Provider Gastroenterology [phone] Your Primary Care Physician (PCP) Primary Care Physician Office Phone Office Fax Marli Esau [phone] Follow-up Information Follow up With Details Comments Contact Info Aissatou Farias MD   [address] [place] [address] [address] [facility] Tabitha Nettles [facility] [address] [place] [address] 
[phone] Robin Sung MD   [address] [place] [address] [address] [facility] [address] [address] [phone] Current Discharge Medication List  
  
CONTINUE these medications which have NOT CHANGED Dose & Instructions Dispensing Information Comments Morning Noon Evening Bedtime  
 acetaminophen 500 mg tablet Commonly known as:  [address] Ty Braswell, [facility] Your last dose was: Your next dose is:    
   
   
 Dose:  1000 mg Take 2 Tabs by mouth every six (6) hours as needed for Pain. Quantity:  50 Tab Refills:  0 ARIPiprazole 5 mg tablet Commonly known as:  ABILIFY Your last dose was: Your next dose is:    
   
   
 Dose:  10 mg Take 10 mg by mouth daily. Refills:  0  
     
   
   
   
  
 benztropine 1 mg tablet Commonly known as:  COGENTIN Your last dose was: Your next dose is:    
   
   
 Dose:  1 mg Take 1 mg by mouth two (2) times a day. Refills:  0  
     
   
   
   
  
 methadone 10 mg tablet Commonly known as:  DOLOPHINE Your last dose was: Your next dose is:    
   
   
 Dose:  60 mg Take 60 mg by mouth daily. Refills:  0  
     
   
   
   
  
 sertraline 50 mg tablet Commonly known as:  ZOLOFT Your last dose was: Your next dose is:    
   
   
 Dose:  50 mg Take 50 mg by mouth daily. Refills:  0 Discharge Instructions DISCHARGE SUMMARY from Nurse POST-PROCEDURE INSTRUCTIONS: 
 
Call your Physician if you: 
? Observe any excess bleeding. ? Develop a temperature over 100.5o F. 
? Experience abdominal, shoulder or chest pain. ? Notice any signs of decreased circulation or nerve impairment to an extremity such as a change in color, persistent numbness, tingling, coldness or increase in pain. ? Vomit blood or you have nausea and vomiting lasting longer than 4 hours. ? Are unable to take medications. ? Are unable to urinate within 8 hours after discharge following general anesthesia or intravenous sedation. For the next 24 hours after receiving general anesthesia or intravenous sedation, or while taking prescription Narcotics, limit your activities: 
? Do NOT drive a motor vehicle, operate hazard machinery or power tools, or perform tasks that require coordination. The medication you received during your procedure may have some effect on your mental awareness. ? Do NOT make important personal or business decisions.   The medication you received during your procedure may have some effect on your mental awareness. ? Do NOT drink alcoholic beverages. These drinks do not mix well with the medications that have been given to you. ? Upon discharge from the hospital, you must be accompanied by a responsible adult. ? Resume your diet as directed by your physician. ? Resume medications as your physician has prescribed. ? Please give a list of your current medications to your Primary Care Provider. ? Please update this list whenever your medications are discontinued, doses are changed, or new medications (including over-the-counter products) are added. ? Please carry medication information at all times in case of emergency situations. These are general instructions for a healthy lifestyle: No smoking/ No tobacco products/ Avoid exposure to second hand smoke. ? Surgeon General's Warning:  Quitting smoking now greatly reduces serious risk to your health. Obesity, smoking, and a sedentary lifestyle greatly increase your risk for illness. ? A healthy diet, regular physical exercise & weight monitoring are important for maintaining a healthy lifestyle ? You may be retaining fluid if you have a history of heart failure or if you experience any of the following symptoms:  Weight gain of 3 pounds or more overnight or 5 pounds in a week, increased swelling in our hands or feet or shortness of breath while lying flat in bed. Please call your doctor as soon as you notice any of these symptoms; do not wait until your next office visit. Recognize signs and symptoms of STROKE: 
F  -  Face looks uneven A  -  Arms unable to move or move unevenly S  -  Speech slurred or non-existent T  -  Time to call 911 - as soon as signs and symptoms begin - DO NOT go back to bed or wait to see If you get better - TIME IS BRAIN. Colorectal Screening ?  Colorectal cancer almost always develops from precancerous polyps (abnormal growths) in the colon or rectum. Screening tests can find precancerous polyps, so that they can be removed before they turn into cancer. Screening tests can also find colorectal cancer early, when treatment works best. 
? Speak with your physician about when you should begin screening and how often you should be tested. Colonoscopy: What to Expect at [facility] Your Recovery After you have a colonoscopy, you will stay at the clinic for 1 to 2 hours until the medicines wear off. Then you can go home. But you will need to arrange for a ride. Your doctor will tell you when you can eat and do your other usual activities. Your doctor will talk to you about when you will need your next colonoscopy. Your doctor can help you decide how often you need to be checked. This will depend on the results of your test and your risk for colorectal cancer. After the test, you may be bloated or have gas pains. You may need to pass gas. If a biopsy was done or a polyp was removed, you may have streaks of blood in your stool (feces) for a few days. This care sheet gives you a general idea about how long it will take for you to recover. But each person recovers at a different pace. Follow the steps below to get better as quickly as possible. How can you care for yourself at home? Activity · Rest when you feel tired. · You can do your normal activities when it feels okay to do so. Diet · Follow your doctor's directions for eating. · Unless your doctor has told you not to, drink plenty of fluids. This helps to replace the fluids that were lost during the colon prep. · Do not drink alcohol. Medicines · If polyps were removed or a biopsy was done during the test, your doctor may tell you not to take aspirin or other anti-inflammatory medicines for a few days. These include ibuprofen (Advil, Motrin) and naproxen (Aleve). Other instructions · For your safety, do not drive or operate machinery until the medicine wears off and you can think clearly. Your doctor may tell you not to drive or operate machinery until the day after your test. 
· Do not sign legal documents or make major decisions until the medicine wears off and you can think clearly. The anesthesia can make it hard for you to fully understand what you are agreeing to. Follow-up care is a key part of your treatment and safety. Be sure to make and go to all appointments, and call your doctor if you are having problems. It's also a good idea to know your test results and keep a list of the medicines you take. When should you call for help? Call 911 anytime you think you may need emergency care. For example, call if: 
· You passed out (lost consciousness). · You pass maroon or bloody stools. · You have severe belly pain. Call your doctor now or seek immediate medical care if: 
· Your stools are black and tarlike. · Your stools have streaks of blood, but you did not have a biopsy or any polyps removed. · You have belly pain, or your belly is swollen and firm. · You vomit. · You have a fever. · You are very dizzy. Watch closely for changes in your health, and be sure to contact your doctor if you have any problems. Where can you learn more? Go to [employer].be Enter E264 in the search box to learn more about \"Colonoscopy: What to Expect at Home. \"  
© 8233-1823 Healthwise, Incorporated. Care instructions adapted under license by Juan Stanford (which disclaims liability or warranty for this information). This care instruction is for use with your licensed healthcare professional. If you have questions about a medical condition or this instruction, always ask your healthcare professional. [place] [address] any warranty or liability for your use of this information. Content Version: 07.1.730462; Current as of: November 14, 2014 Discharge Orders None Introducing \[facility] [place] [facility]\"" & HEALTH SERVICES! Sheila Solorio introduces [employer] patient portal. Now you can access parts of your medical record, email your doctor's office, and request medication refills online. 1. In your internet browser, go to [email or web address] 2. Click on the First Time User? Click Here link in the Sign In box. You will see the New Member Sign Up page. 3. Enter your [employer] Access Code exactly as it appears below. You will not need to use this code after youve completed the sign-up process. If you do not sign up before the expiration date, you must request a new code. · [employer] Access Code: [email or web address] Expires: 6/11/2017 11:19 PM 
 
4. Enter the last four digits of your Social Security Number (xxxx) and Date of Birth (mm/dd/yyyy) as indicated and click Submit. You will be taken to the next sign-up page. 5. Create a [employer] ID. This will be your [employer] login ID and cannot be changed, so think of one that is secure and easy to remember. 6. Create a [employer] password. You can change your password at any time. 7. Enter your Password Reset Question and Answer. This can be used at a later time if you forget your password. 8. Enter your e-mail address. You will receive e-mail notification when new information is available in [address] 9. Click Sign Up. You can now view and download portions of your medical record. 10. Click the Download Summary menu link to download a portable copy of your medical information. If you have questions, please visit the Frequently Asked Questions section of the [employer] website. Remember, [employer] is NOT to be used for urgent needs. For medical emergencies, dial 911. Now available from your iPhone and Android! General Information Please provide this summary of care documentation to your next provider. Patient Signature:  ____________________________________________________________ Date:  ____________________________________________________________  
  
Olu Mary Provider Signature:  ____________________________________________________________ Date:  ____________________________________________________________

## 2017-05-11 ENCOUNTER — HOSPITAL ENCOUNTER (EMERGENCY)
Age: 42
Discharge: HOME | End: 2017-05-11
Attending: EMERGENCY MEDICINE
Payer: MEDICAID

## 2017-05-11 ENCOUNTER — APPOINTMENT (OUTPATIENT)
Dept: GENERAL RADIOLOGY | Age: 42
End: 2017-05-11
Attending: PHYSICIAN ASSISTANT
Payer: MEDICAID

## 2017-05-11 VITALS
SYSTOLIC BLOOD PRESSURE: 132 MMHG | TEMPERATURE: 99.9 F | RESPIRATION RATE: 20 BRPM | BODY MASS INDEX: 35.79 KG/M2 | DIASTOLIC BLOOD PRESSURE: 80 MMHG | HEART RATE: 100 BPM | WEIGHT: 250 LBS | HEIGHT: 70 IN | OXYGEN SATURATION: 97 %

## 2017-05-11 DIAGNOSIS — D72.829 LEUKOCYTOSIS, UNSPECIFIED TYPE: ICD-10-CM

## 2017-05-11 DIAGNOSIS — Z20.818 EXPOSURE TO STREP THROAT: ICD-10-CM

## 2017-05-11 DIAGNOSIS — J02.9 PHARYNGITIS, UNSPECIFIED ETIOLOGY: ICD-10-CM

## 2017-05-11 DIAGNOSIS — R07.89 CHEST WALL PAIN: Primary | ICD-10-CM

## 2017-05-11 LAB
ANION GAP BLD CALC-SCNC: 7 MMOL/L (ref 3–18)
BASOPHILS # BLD AUTO: 0 K/UL (ref 0–0.06)
BASOPHILS # BLD: 0 % (ref 0–2)
BUN SERPL-MCNC: 8 MG/DL (ref 7–18)
BUN/CREAT SERPL: 7 (ref 12–20)
CALCIUM SERPL-MCNC: 8.9 MG/DL (ref 8.5–10.1)
CHLORIDE SERPL-SCNC: 104 MMOL/L (ref 100–108)
CO2 SERPL-SCNC: 30 MMOL/L (ref 21–32)
CREAT SERPL-MCNC: 1.09 MG/DL (ref 0.6–1.3)
DIFFERENTIAL METHOD BLD: ABNORMAL
EOSINOPHIL # BLD: 0.1 K/UL (ref 0–0.4)
EOSINOPHIL NFR BLD: 1 % (ref 0–5)
ERYTHROCYTE [DISTWIDTH] IN BLOOD BY AUTOMATED COUNT: 14.1 % (ref 11.6–14.5)
FLUAV AG NPH QL IA: NEGATIVE
FLUBV AG NOSE QL IA: NEGATIVE
GLUCOSE SERPL-MCNC: 116 MG/DL (ref 74–99)
HCT VFR BLD AUTO: 39.9 % (ref 36–48)
HGB BLD-MCNC: 12.9 G/DL (ref 13–16)
LYMPHOCYTES # BLD AUTO: 16 % (ref 21–52)
LYMPHOCYTES # BLD: 2.4 K/UL (ref 0.9–3.6)
MCH RBC QN AUTO: 25.2 PG (ref 24–34)
MCHC RBC AUTO-ENTMCNC: 32.3 G/DL (ref 31–37)
MCV RBC AUTO: 77.9 FL (ref 74–97)
MONOCYTES # BLD: 0.9 K/UL (ref 0.05–1.2)
MONOCYTES NFR BLD AUTO: 6 % (ref 3–10)
NEUTS SEG # BLD: 11 K/UL (ref 1.8–8)
NEUTS SEG NFR BLD AUTO: 77 % (ref 40–73)
PLATELET # BLD AUTO: 199 K/UL (ref 135–420)
PMV BLD AUTO: 9.2 FL (ref 9.2–11.8)
POTASSIUM SERPL-SCNC: 4.1 MMOL/L (ref 3.5–5.5)
RBC # BLD AUTO: 5.12 M/UL (ref 4.7–5.5)
SODIUM SERPL-SCNC: 141 MMOL/L (ref 136–145)
WBC # BLD AUTO: 14.4 K/UL (ref 4.6–13.2)

## 2017-05-11 PROCEDURE — 87081 CULTURE SCREEN ONLY: CPT | Performed by: PHYSICIAN ASSISTANT

## 2017-05-11 PROCEDURE — 74011000258 HC RX REV CODE- 258: Performed by: PHYSICIAN ASSISTANT

## 2017-05-11 PROCEDURE — 80048 BASIC METABOLIC PNL TOTAL CA: CPT | Performed by: PHYSICIAN ASSISTANT

## 2017-05-11 PROCEDURE — 74011000250 HC RX REV CODE- 250: Performed by: PHYSICIAN ASSISTANT

## 2017-05-11 PROCEDURE — 96365 THER/PROPH/DIAG IV INF INIT: CPT

## 2017-05-11 PROCEDURE — 96361 HYDRATE IV INFUSION ADD-ON: CPT

## 2017-05-11 PROCEDURE — 87804 INFLUENZA ASSAY W/OPTIC: CPT | Performed by: PHYSICIAN ASSISTANT

## 2017-05-11 PROCEDURE — 74011250637 HC RX REV CODE- 250/637: Performed by: PHYSICIAN ASSISTANT

## 2017-05-11 PROCEDURE — 71020 XR CHEST PA LAT: CPT

## 2017-05-11 PROCEDURE — 85025 COMPLETE CBC W/AUTO DIFF WBC: CPT | Performed by: PHYSICIAN ASSISTANT

## 2017-05-11 PROCEDURE — 99283 EMERGENCY DEPT VISIT LOW MDM: CPT

## 2017-05-11 PROCEDURE — 74011250636 HC RX REV CODE- 250/636: Performed by: PHYSICIAN ASSISTANT

## 2017-05-11 PROCEDURE — 87077 CULTURE AEROBIC IDENTIFY: CPT | Performed by: PHYSICIAN ASSISTANT

## 2017-05-11 RX ORDER — IBUPROFEN 600 MG/1
600 TABLET ORAL
Status: COMPLETED | OUTPATIENT
Start: 2017-05-11 | End: 2017-05-11

## 2017-05-11 RX ORDER — BENZONATATE 200 MG/1
200 CAPSULE ORAL
Qty: 30 CAP | Refills: 0 | Status: SHIPPED | OUTPATIENT
Start: 2017-05-11 | End: 2017-05-18

## 2017-05-11 RX ORDER — CLINDAMYCIN HYDROCHLORIDE 300 MG/1
300 CAPSULE ORAL 4 TIMES DAILY
Qty: 28 CAP | Refills: 0 | Status: SHIPPED | OUTPATIENT
Start: 2017-05-11 | End: 2017-05-18

## 2017-05-11 RX ORDER — IBUPROFEN 600 MG/1
600 TABLET ORAL
Qty: 20 TAB | Refills: 0 | Status: SHIPPED | OUTPATIENT
Start: 2017-05-11 | End: 2019-03-24

## 2017-05-11 RX ORDER — SODIUM CHLORIDE 9 MG/ML
1000 INJECTION, SOLUTION INTRAVENOUS ONCE
Status: COMPLETED | OUTPATIENT
Start: 2017-05-11 | End: 2017-05-11

## 2017-05-11 RX ADMIN — IBUPROFEN 600 MG: 600 TABLET ORAL at 18:57

## 2017-05-11 RX ADMIN — SODIUM CHLORIDE 1000 ML: 900 INJECTION, SOLUTION INTRAVENOUS at 20:15

## 2017-05-11 RX ADMIN — CLINDAMYCIN PHOSPHATE 600 MG: 150 INJECTION, SOLUTION, CONCENTRATE INTRAVENOUS at 21:15

## 2017-05-11 NOTE — ED TRIAGE NOTES
URI symptoms since yesterday. Arrived with wife with same complaints. Cough with muscular chest pain . Sore throat and headache.  chills

## 2017-05-11 NOTE — ED NOTES
Verbal shift change report given to Norma Cho (oncoming nurse) by Esvin Potter (offgoing nurse). Report included the following information SBAR.

## 2017-05-11 NOTE — ED PROVIDER NOTES
HPI Comments: 6:17 PM Hang Rodriguez is a 39 y.o. male with a history of Schizophrenia and Hepatitis C who presents to the emergency department c/o Cough onset 3 days ago with associated chest pain with coughing, headache, nasal congestion, generalized myalgias, chills, diaphoresis and sore throat. Pt is here with wife who has similar symptoms. He denies SOB, known fever or any other symptoms at this time. No other concerns at this time. PCP: Kiah Olivo MD      The history is provided by the patient. Past Medical History:   Diagnosis Date    Anxiety 10/11/2016    Continuous nicotine dependence 10/11/2016    Depression     Headache     Hep C w/o coma, chronic ([facility] 10/11/2016    Hepatitis C     Insomnia 10/11/2016    Obesity, Class II, BMI 35-39.9 ([facility] 10/11/2016    Paranoid schizophrenia ([facility]     Psychotic disorder     Schizophrenia ([facility] 10/11/2016       Past Surgical History:   Procedure Laterality Date    COLONOSCOPY N/A 3/30/2017    COLONOSCOPY performed by Latisha Malone MD at [facility] ENDOSCOPY    HX APPENDECTOMY  2000         Family History:   Problem Relation Age of Onset    Cancer Mother 36     colon    Cancer Maternal Aunt      breast    Diabetes Father        Social History     Social History    Marital status: LEGALLY      Spouse name: N/A    Number of children: N/A    Years of education: N/A     Occupational History    Not on file. Social History Main Topics    Smoking status: Current Every Day Smoker     Packs/day: 0.00     Years: 10.00    Smokeless tobacco: Never Used      Comment: vapor (18mg nicotine)    Alcohol use No    Drug use: No      Comment: [facility]: [facility]    Sexual activity: Yes     Partners: Female     Other Topics Concern    Not on file     Social History Narrative         ALLERGIES: Pcn [penicillins]    Review of Systems   Constitutional: Positive for chills and diaphoresis. Negative for fatigue and fever. HENT: Positive for congestion and sore throat. Negative for ear pain and rhinorrhea. Eyes: Negative. Negative for pain, redness and visual disturbance. Respiratory: Positive for cough. Negative for shortness of breath, wheezing and stridor. Cardiovascular: Positive for chest pain (secondary to cough ). Negative for palpitations and leg swelling. Chest pain with coughing   Gastrointestinal: Negative. Negative for abdominal pain, constipation, diarrhea, nausea and vomiting. Endocrine: Negative. Genitourinary: Negative. Negative for dysuria, flank pain, frequency and hematuria. Musculoskeletal: Positive for myalgias (generalized). Negative for back pain, neck pain and neck stiffness. Skin: Negative. Negative for rash and wound. Allergic/Immunologic: Negative. Neurological: Positive for headaches. Negative for dizziness, seizures, syncope, weakness, light-headedness and numbness. Hematological: Negative. Psychiatric/Behavioral: Negative. All other systems reviewed and are negative. There were no vitals filed for this visit. Physical Exam   Constitutional: He is oriented to person, place, and time. He appears well-developed and well-nourished. He appears distressed. HENT:   Head: Normocephalic. Oropharynx erythematous without exudates , bilateral nasal congestion   Neck: Normal range of motion. Neck supple. Cardiovascular: Regular rhythm and normal heart sounds. Exam reveals no gallop and no friction rub. No murmur heard. Tachycardiac    Pulmonary/Chest: Effort normal and breath sounds normal. No stridor. No respiratory distress. He has no wheezes. He has no rales. He exhibits tenderness. Musculoskeletal: Normal range of motion. Neurological: He is alert and oriented to person, place, and time. Gait is steady. Able to ambulate without difficulty. Skin: Skin is warm and dry. No rash noted. He is not diaphoretic. No erythema.    Psychiatric: He has a normal mood and affect. His behavior is normal. Thought content normal.   Nursing note and vitals reviewed. MDM  Number of Diagnoses or Management Options  Chest wall pain:   Exposure to strep throat:   Leukocytosis, unspecified type:   Pharyngitis, unspecified etiology:   Diagnosis management comments: Impression:  Pharyngitis, strep exposure, fever, leukocytosis, chest wall pain     IV inserted 1 L saline bolus 600 mg motrin, and 600 mg clindamycin given. RST negative   Influenza A/B negative  Chest x-ray: no definite infiltrates or acute cardiopulmonary process noted, will notify pt if radiologist notes any additional findings     WBC 14.4, hgb 12.9, ABG 11, grans 77, lymph 16, glucose 116, BUCR 7,     Progress: pt sx improving    Patient is stable for discharge at this time. Rx for clindamycin, tessalon, and motrin given. Rest and follow-up with PCP this week. Return to the ED immediately for any new or worsening sx.   Rebecca Treadwell PA-C 9:42 PM        Amount and/or Complexity of Data Reviewed  Clinical lab tests: ordered and reviewed  Tests in the radiology section of CPT®: ordered and reviewed    Risk of Complications, Morbidity, and/or Mortality  Presenting problems: moderate  Diagnostic procedures: moderate  Management options: moderate    Patient Progress  Patient progress: stable    ED Course       Procedures

## 2017-05-12 ENCOUNTER — PATIENT OUTREACH (OUTPATIENT)
Dept: FAMILY MEDICINE CLINIC | Facility: CLINIC | Age: 42
End: 2017-05-12

## 2017-05-12 NOTE — PROGRESS NOTES
Patient discharged from [facility] -ED on 5/11/17 with c/o Cough, Nasal Congestion, Sorethroat. He was discharged to Home on 5/11/17. Attempt to contact patient via telephone on 5/12/17 for post EDfollow up. Unable to reach. Left message on voicemail with office contact information. No Patient medical information details left on voice  message.

## 2017-05-13 LAB
B-HEM STREP THROAT QL CULT: NEGATIVE
BACTERIA SPEC CULT: ABNORMAL
SERVICE CMNT-IMP: ABNORMAL

## 2017-08-01 ENCOUNTER — PATIENT OUTREACH (OUTPATIENT)
Dept: FAMILY MEDICINE CLINIC | Facility: CLINIC | Age: 42
End: 2017-08-01

## 2017-08-01 NOTE — PROGRESS NOTES
Patient discharged from [facility] -ED on 5/11/17 with c/o Cough, Nasal Congestion, Sorethroat. He was discharged to Home on 5/11/17. Attempt to contact patient via telephone on 8/1/17 for  follow up. Unable to reach. Unable to leave a voice message. Message is full. Did not leave a voice message on the patient's other number as the name on voice message has a different name . Noted no hospitalization  admission post 30 days from discharge date 5/11/17. This episode is closed. Post ED/ Hospitalization Encounter Resolved.

## 2018-01-08 ENCOUNTER — TELEPHONE (OUTPATIENT)
Dept: FAMILY MEDICINE CLINIC | Facility: CLINIC | Age: 43
End: 2018-01-08

## 2018-01-08 NOTE — TELEPHONE ENCOUNTER
Called home number. Verified name and . Spoke with patient. This call was concerning appointment. Per patient he will call back to schedule.

## 2019-03-24 ENCOUNTER — HOSPITAL ENCOUNTER (EMERGENCY)
Age: 44
Discharge: HOME | End: 2019-03-24
Attending: EMERGENCY MEDICINE
Payer: MEDICAID

## 2019-03-24 ENCOUNTER — APPOINTMENT (OUTPATIENT)
Dept: GENERAL RADIOLOGY | Age: 44
End: 2019-03-24
Attending: NURSE PRACTITIONER
Payer: MEDICAID

## 2019-03-24 VITALS
RESPIRATION RATE: 16 BRPM | WEIGHT: 260 LBS | OXYGEN SATURATION: 98 % | TEMPERATURE: 99.1 F | SYSTOLIC BLOOD PRESSURE: 108 MMHG | BODY MASS INDEX: 37.22 KG/M2 | HEART RATE: 98 BPM | DIASTOLIC BLOOD PRESSURE: 88 MMHG | HEIGHT: 70 IN

## 2019-03-24 DIAGNOSIS — S92.901A FOOT FRACTURE, RIGHT, CLOSED, INITIAL ENCOUNTER: Primary | ICD-10-CM

## 2019-03-24 PROCEDURE — 74011250637 HC RX REV CODE- 250/637: Performed by: NURSE PRACTITIONER

## 2019-03-24 PROCEDURE — 99283 EMERGENCY DEPT VISIT LOW MDM: CPT

## 2019-03-24 PROCEDURE — 73630 X-RAY EXAM OF FOOT: CPT

## 2019-03-24 RX ORDER — OXYCODONE AND ACETAMINOPHEN 5; 325 MG/1; MG/1
2 TABLET ORAL
Status: COMPLETED | OUTPATIENT
Start: 2019-03-24 | End: 2019-03-24

## 2019-03-24 RX ORDER — IBUPROFEN 400 MG/1
800 TABLET ORAL
Status: COMPLETED | OUTPATIENT
Start: 2019-03-24 | End: 2019-03-24

## 2019-03-24 RX ORDER — ONDANSETRON 4 MG/1
4 TABLET, ORALLY DISINTEGRATING ORAL
Status: COMPLETED | OUTPATIENT
Start: 2019-03-24 | End: 2019-03-24

## 2019-03-24 RX ADMIN — IBUPROFEN 800 MG: 400 TABLET ORAL at 19:48

## 2019-03-24 RX ADMIN — OXYCODONE AND ACETAMINOPHEN 2 TABLET: 5; 325 TABLET ORAL at 19:48

## 2019-03-24 RX ADMIN — ONDANSETRON 4 MG: 4 TABLET, ORALLY DISINTEGRATING ORAL at 19:49

## 2019-03-24 NOTE — ED PROVIDER NOTES
EMERGENCY DEPARTMENT HISTORY AND PHYSICAL EXAM 
 
7:21 PM 
 
 
Date: 3/24/2019 Patient Name: Raquel Kumar History of Presenting Illness Chief Complaint Patient presents with  Foot Injury History Provided By: Patient Additional History (Context): Raquel Kumar is a 37 y.o. male with a PMHX anxiety, continuous nicotine dependence, depression, hepatitis C, insomnia, class II obesity, paranoid schizophrenia arrived to ER c/o right foot injury two weeks ago. Patient states, \"I accidentally hit it on a T. V.\"  Denies numbness/tingling or loss of sensation. Patient denies any other concerns. PCP: Truman Sepulveda MD 
 
Chief Complaint: right foot injury Duration:  Weeks Timing:  Intermittent Location: right foot Quality: Aching Severity: Moderate Modifying Factors: none Associated Symptoms: denies any other associated signs or symptoms Past History Past Medical History: 
Past Medical History:  
Diagnosis Date  Anxiety 10/11/2016  Continuous nicotine dependence 10/11/2016  Depression  Headache  Hep C w/o coma, chronic ([facility] 10/11/2016  Hepatitis C   
 Insomnia 10/11/2016  Obesity, Class II, BMI 35-39.9 10/11/2016  Paranoid schizophrenia ([facility]  Psychotic disorder ([facility]  Schizophrenia ([facility] 10/11/2016 Past Surgical History: 
Past Surgical History:  
Procedure Laterality Date  COLONOSCOPY N/A 3/30/2017 COLONOSCOPY performed by Sujatha Mendiola MD at [facility] Family History: 
Family History Problem Relation Age of Onset  Cancer Mother 36  
     colon  Cancer Maternal Aunt   
     breast  
 Diabetes Father Social History: 
Social History Tobacco Use  Smoking status: Current Every Day Smoker Packs/day: 0.00 Years: 10.00 Pack years: 0.00  Smokeless tobacco: Never Used  Tobacco comment: vapor (18mg nicotine) Substance Use Topics  Alcohol use:  No  
  Drug use: No  
  Types: Heroin Comment: [facility]in: [facility] time [facility], [facility] Allergies: Allergies Allergen Reactions  Pcn [Penicillins] Hives Review of Systems Review of Systems Constitutional: Negative for activity change, appetite change, chills, diaphoresis, fatigue and fever. Respiratory: Negative for cough and shortness of breath. Cardiovascular: Negative. Negative for chest pain, palpitations and leg swelling. Gastrointestinal: Negative for abdominal distention, abdominal pain, constipation, diarrhea, nausea and vomiting. Musculoskeletal: C/o right foot injury Skin: Negative for rash and wound. Neurological: Negative for dizziness, syncope, weakness, light-headedness and headaches. Psychiatric/Behavioral: Negative for sleep disturbance and suicidal ideas. All other systems reviewed and are negative. Physical Exam  
 
Visit Vitals /84 (BP 1 Location: Left arm, BP Patient Position: At rest) Pulse 88 Temp 99.1 °F (37.3 °C) Resp 16 Ht 5' 10\" (1.778 m) Wt 117.9 kg (260 lb) SpO2 95% BMI 37.31 kg/m² Physical Exam  
Constitutional: He is oriented to person, place, and time. He appears well-developed and well-nourished. No distress. Cardiovascular: Normal rate, regular rhythm, normal heart sounds and intact distal pulses. Exam reveals no gallop and no friction rub. No murmur heard. Pulmonary/Chest: Effort normal and breath sounds normal. No respiratory distress. He has no wheezes. He has no rales. He exhibits no tenderness. Musculoskeletal: Normal range of motion. Right ankle: Normal. Achilles tendon normal.  
     Right foot: There is tenderness and bony tenderness. There is normal range of motion, normal capillary refill, no crepitus and no deformity. Feet: 
 
Neurological: He is alert and oriented to person, place, and time. Skin: Skin is warm and dry. He is not diaphoretic. Psychiatric: He has a normal mood and affect. His speech is normal and behavior is normal. Judgment and thought content normal. Cognition and memory are normal.  
Nursing note and vitals reviewed. Diagnostic Study Results Labs -none No results found for this or any previous visit (from the past 12 hour(s)). Radiologic Studies -  
XR FOOT RT MIN 3 V    (Results Pending) Medical Decision Making It should be noted that I, Wisam Jeronimo MD will be the provider of record for this patient. I reviewed the vital signs, available nursing notes, past medical history, past surgical history, family history and social history. Vital Signs-Reviewed the patient's vital signs. Records Reviewed: Old Medical Records (Time of Review: 7:21 PM) 
 
ED Course: Progress Notes, Reevaluation, and Consults: 
 
Other Procedure; Post op shoe right foot Date/Time: 3/24/2019 8:04 PM 
Performed by: Anthony Reed Authorized by: Dory Bone NP Consent:  
  Consent obtained:  Verbal 
  Consent given by:  Patient Risks discussed:  Pain and nerve damage Alternatives discussed:  Referral 
Anesthesia (see MAR for exact dosages): Anesthesia method:  None Post-procedure details:  
  Patient tolerance of procedure: Tolerated well, no immediate complications Provider Notes (Medical Decision Making): DDX: 
Fracture Dislocation Ligament Injury Clinical Impression/plan: Patient stable condition. Reviewed x-ray with patient and spouse. Answered questions will refer to Ortho. Follow-up with PCP in 2-4 days. He does worsen or have any other concerns please return to ER immediately. Patient verbalizes discharge instructions Diagnosis Clinical Impression: 1. Foot fracture, right, closed, initial encounter Disposition: Home Follow-up Information Follow up With Specialties Details Why Contact Info Guanako Santa MD Internal Medicine Schedule an appointment as soon as possible for a visit in 2 days  [address] [place] [address] [phone] 
[phone] [address] Orthopedic Surgery Schedule an appointment as soon as possible for a visit in 2 days  [facility], [address] [facility] 
[phone] Return to ER immediately for any worsening symptoms or concerns Patient's Medications Start Taking No medications on file Continue Taking No medications on file These Medications have changed No medications on file Stop Taking ACETAMINOPHEN (TYLENOL EXTRA STRENGTH) 500 MG TABLET    Take 2 Tabs by mouth every six (6) hours as needed for Pain. ARIPIPRAZOLE (ABILIFY) 5 MG TABLET    Take 10 mg by mouth daily. BENZTROPINE (COGENTIN) 1 MG TABLET    Take 1 mg by mouth two (2) times a day. IBUPROFEN (MOTRIN) 600 MG TABLET    Take 1 Tab by mouth every six (6) hours as needed for Pain. METHADONE (DOLOPHINE) 10 MG TABLET    Take 60 mg by mouth daily. SERTRALINE (ZOLOFT) 50 MG TABLET    Take 50 mg by mouth daily. _______________________________

## 2019-03-24 NOTE — ED TRIAGE NOTES
Pt. Complains of right foot pain after hitting it on a TV on the floor 2 weeks ago, pt arrived ambulatory with a limp. Pt has good PMS and ROM with no obvious deformities. Pt does have noted swelling to the right foot.

## 2019-03-24 NOTE — LETTER
[address] [place] [address] [facility] EM[facility] 
[address] [facility] [id number] [phone] Work/School Note Date: 3/24/2019 To Whom It May concern: Saintclair Hoard was seen and treated today in the emergency room by the following provider(s): 
Attending Provider: Alexi Tran MD 
Nurse Practitioner: Rosendo Del Valle NP. Saintclair Hoard may return to work on 03/26/2019. Sincerely, Markus Yadav NP

## 2019-03-24 NOTE — DISCHARGE INSTRUCTIONS
Patient Education        Broken Foot: Care Instructions  Your Care Instructions    A broken foot, or foot fracture, is a break in one or more of the bones in your foot. It may happen because of a sports injury, a fall, or other accident. A compound, or open, fracture occurs when a bone breaks through the skin. A break that does not poke through the skin is a closed fracture. Your treatment depends on the location and type of break in your foot. You may need a splint, a cast, or an orthopedic shoe. Certain kinds of injuries may need surgery at some time. Whatever your treatment, you can ease symptoms and help your foot heal with care at home. You may need 6 to 8 weeks or more to fully heal.  You heal best when you take good care of yourself. Eat a variety of healthy foods, and don't smoke. Follow-up care is a key part of your treatment and safety. Be sure to make and go to all appointments, and call your doctor if you are having problems. It's also a good idea to know your test results and keep a list of the medicines you take. How can you care for yourself at home? · Be safe with medicines. Take pain medicines exactly as directed. ? If the doctor gave you a prescription medicine for pain, take it as prescribed. ? If you are not taking a prescription pain medicine, ask your doctor if you can take an over-the-counter medicine. · Leave the splint on until your follow-up appointment. Do not put any weight on the injured foot. If you were given crutches, use them as directed. · Put ice or a cold pack on your foot for 10 to 20 minutes at a time. Try to do this every 1 to 2 hours for the next 3 days (when you are awake) or until the swelling goes down. Put a thin cloth between the ice and your skin. · Prop up the sore foot on a pillow anytime you sit or lie down during the next 3 days. Try to keep it above the level of your heart. This will help reduce swelling.   · Follow the cast care instructions your doctor gives you. If you have a splint, do not take it off unless your doctor tells you to. Cast and splint care  · If you have a removable splint, ask your doctor if it is okay to remove it to bathe. Your doctor may want you to keep it on as much as possible. · Keep your plaster splint covered by taping a sheet of plastic around it when you bathe. Water under the plaster can cause your skin to itch and hurt. · Never cut your splint. When should you call for help? Call 911 anytime you think you may need emergency care. For example, call if:    · You have chest pain, are short of breath, or you cough up blood.    Call your doctor now or seek immediate medical care if:    · You have new or worse pain.     · Your foot is cool or pale or changes color.     · You have tingling, weakness, or numbness in your toes.     · Your cast or splint feels too tight.     · You have signs of a blood clot in your leg (called a deep vein thrombosis), such as:  ? Pain in your calf, back of the knee, thigh, or groin. ? Redness or swelling in your leg.    Watch closely for changes in your health, and be sure to contact your doctor if:    · You have a problem with your splint or cast.     · You do not get better as expected. Where can you learn more? Go to http://laina-taylor.info/. Enter G961 in the search box to learn more about \"Broken Foot: Care Instructions. \"  Current as of: September 20, 2018  Content Version: 11.9  © 4731-4222 [employer]. Care instructions adapted under license by [employer] (which disclaims liability or warranty for this information). If you have questions about a medical condition or this instruction, always ask your healthcare professional. [address] any warranty or liability for your use of this information.

## 2019-03-25 NOTE — ED NOTES
Pt discharged/ambulatory to home. Affect calm/conversant, respirations regular/non labored/skin warm and dry. VS repeated; provider notified. Pt instructed to follow up with orthopedics, and due to Percocet administration pt has been instructed to use caution ambulating, and to prevent falls. No distress noted.

## 2019-03-27 ENCOUNTER — OFFICE VISIT (OUTPATIENT)
Dept: ORTHOPEDIC SURGERY | Age: 44
End: 2019-03-27

## 2019-03-27 VITALS
OXYGEN SATURATION: 91 % | DIASTOLIC BLOOD PRESSURE: 97 MMHG | TEMPERATURE: 98.1 F | WEIGHT: 268.2 LBS | HEIGHT: 70 IN | HEART RATE: 78 BPM | RESPIRATION RATE: 16 BRPM | SYSTOLIC BLOOD PRESSURE: 151 MMHG | BODY MASS INDEX: 38.39 KG/M2

## 2019-03-27 DIAGNOSIS — M25.571 ACUTE RIGHT ANKLE PAIN: ICD-10-CM

## 2019-03-27 DIAGNOSIS — M19.071 PRIMARY OSTEOARTHRITIS OF RIGHT FOOT: ICD-10-CM

## 2019-03-27 DIAGNOSIS — S90.31XA CONTUSION OF RIGHT FOOT, INITIAL ENCOUNTER: Primary | ICD-10-CM

## 2019-03-27 PROBLEM — E66.01 SEVERE OBESITY (HCC): Status: ACTIVE | Noted: 2019-03-27

## 2019-03-27 RX ORDER — MELOXICAM 15 MG/1
15 TABLET ORAL
Qty: 30 TAB | Refills: 0 | Status: SHIPPED | OUTPATIENT
Start: 2019-03-27 | End: 2019-04-04

## 2019-03-27 RX ORDER — FAMOTIDINE 40 MG/1
40 TABLET, FILM COATED ORAL DAILY
Qty: 30 TAB | Refills: 0 | Status: SHIPPED | OUTPATIENT
Start: 2019-03-27 | End: 2019-04-04

## 2019-03-27 NOTE — PROGRESS NOTES
AMBULATORY PROGRESS NOTE      Patient: Alice Beltrán             MRN: [id number]     SSN: [id number] Body mass index is 38.48 kg/m². YOB: 1975     AGE: 37 y.o. EX: male    PCP: Daisy Park MD     IMPRESSION/DIAGNOSIS AND TREATMENT PLAN     DIAGNOSES  1. Contusion of right foot, initial encounter    2. Acute right ankle pain    3. Primary osteoarthritis of right foot        Orders Placed This Encounter    AMB SUPPLY ORDER    meloxicam (MOBIC) 15 mg tablet    famotidine (PEPCID) 40 mg tablet      Alice Beltrán understands his diagnoses and the proposed plan. My impression is that he has peroneal brevis tendinitis, also has a contusion to his 5th metatarsal base region, has some OA to the navicular cuneiform, medialmost navicular #1 NC joint has some OA in this region. He denies any history of lateral foot pain on the medial side, denies having any known arthritis in the medial column of his foot, denies walking on the outside of his foot. I did explain to him that some patients have a 5th metatarsal base pain because a medial column OA but his history is such that he hit his foot on his television a few weeks ago and he is still having pain and discomfort. The impetus or sentinel event is a contusion to his foot. Plans are listed as below. Plan:    1) DME Order: Short right CAM walker boot. 2) Mobic 15 m PO every day; 30 tablets, 0 refills. 3) Pepcid 40 m PO every day; 30 tablets, 0 refills. 4) Continue activity modification as directed. RTO - 2 weeks     HPI AND EXAMINATION     Alice Beltrán IS A 37 y.o. male who presents to my outpatient office complaining of right foot pain. Mr. Laurent White reports that 2 weeks ago, he struck his right foot against a TV while inside his home. He recalls that he was walking fast past the TV when he struck his foot against it. He reports that he initially only had a bruise, and that he the bruise and pain would ago away. However, two weeks past, and he found that he had difficult bearing weight and walking due to pain. As such, he was seen at the [facility] ED on March 24th, where he was diagnosed with a right ankle fracture. He presents to the office today with crutches. He is accompanied by his girlfriend or partner. The patient denies any GI issues or bariatric procedures. He is not currently taking blood thinners. Date of injury: 03/13/2019 (approximately). Visit Vitals  BP (!) 151/97 (BP 1 Location: Left arm, BP Patient Position: Sitting)   Pulse 78   Temp 98.1 °F (36.7 °C) (Oral)   Resp 16   Ht 5' 10\" (1.778 m)   Wt 268 lb 3.2 oz (121.7 kg)   SpO2 91%   BMI 38.48 kg/m²     Appearance: Alert, well appearing and pleasant patient who is in no distress, oriented to person, place/time, and who follows commands. Psychiatric: Affect and mood are appropriate. Cardiovascular/Peripheral Vascular: Normal Pulses to each hand and foot  Musculoskeletal:  LOCATION:  Right FOOT/ANKLE  Integumentary: No rashes, skin patches, wounds, or abrasions to the right or left legs       Warm and normal color. No regions of expressible drainage.     Swelling to lateral ankle mild present    Nice wrinkles    Discoloration to the anterolateral ankle, darker in color    Swelling to medial ankle not present      Gait: uses assistive device , crutches      Tenderness: ATFL/CFL Anterolateral ankle ligaments tenderness is not present   Anterior Syndesmosis is not present    Medial deltoid ligament tenderness is not present    Peroneal tendon sheath not present    Peroneal brevis tendon insertion point tenderness is present   4 Metatarsal base tenderness is not present     5th metatarsal tenderness is present   Midfoot tenderness is not present    Achilles tenderness is not present    Cuboid tenderness is not present     Proximal fibula tenderness: is not present      Motor Strength/Tone Exam: Normal to the toes with respect to extension/flexion      Sensory Exam: Intact Normal Sensation to ankle/foot      Stability Testing: Peroneal tendon instability tests: not conducted due to tenderness              Stability of ankle and subtalar region not tested due to tenderness      ROM: Decreased ROM noted to ankle      Decreased Hindfoot (Subtalar, CC, TN regions)        Decreased Forefoot         Contractures: No Achilles or Gastrocnemius Contractures      Calf tenderness: Absent for calf or gastrocnemius muscle regions       Soft, supple, non tender, non taut lower extremity compartments       Alignment: Neutral Hindfoot  Wounds/Abrasions:   None present  Extremities:   No embolic phenomena to the toes or hands         No significant edema to the foot and or toes. Lower extremities are warm and appear well perfused    DVT: No evidence of DVT seen on examination at this time     No calf swelling, no tenderness to calf muscles  Lymphatic:  No Evidence of Lymphedema  Vascular: Medial Border of Tibia Region: Edema is not present        Pulses: Dorsalis Pedis &  Posterior Tibial Pulses : Palpable yes        Varicosities Lower Limbs :  None    Neuro: Negative bilateral Straight leg raise (seated position)    See Musculoskeletal section for pertinent individual extremity examination    No abnormal hand/wrist, foot/ankle, or facial/neck tremors. CHART REVIEW     Past Medical History:   Diagnosis Date    Anxiety 10/11/2016    Continuous nicotine dependence 10/11/2016    Depression     Headache     Hep C w/o coma, chronic ([facility] [address] 10/11/2016    Hepatitis C     Insomnia 10/11/2016    Obesity, Class II, BMI 35-39.9 10/11/2016    Paranoid schizophrenia ([facility] [address]     Psychotic disorder (HCC)     Schizophrenia ([facility] [address] 10/11/2016     Current Outpatient Medications   Medication Sig    meloxicam (MOBIC) 15 mg tablet Take 1 Tab by mouth daily (with breakfast).  famotidine (PEPCID) 40 mg tablet Take 1 Tab by mouth daily.      No current facility-administered medications for this visit. Allergies   Allergen Reactions    Pcn [Penicillins] Hives     Past Surgical History:   Procedure Laterality Date    COLONOSCOPY N/A 3/30/2017    COLONOSCOPY performed by Iker Higginbotham MD at [address] HX APPENDECTOMY  2000     Social History     Occupational History    Not on file   Tobacco Use    Smoking status: Current Every Day Smoker     Packs/day: 0.00     Years: 10.00     Pack years: 0.00    Smokeless tobacco: Never Used    Tobacco comment: vapor (18mg nicotine)   Substance and Sexual Activity    Alcohol use: No    Drug use: No     Types: Heroin     Comment: [facility]    Sexual activity: Yes     Partners: Female     Family History   Problem Relation Age of Onset    Cancer Mother 36        colon    Cancer Maternal Aunt         breast    Diabetes Father       REVIEW OF SYSTEMS : 3/27/2019  ALL BELOW ARE Negative except : SEE HPI     CONSTITUTIONAL: denies chills, fatigue, fever, weight change   PSYCH: denies anxiety, depression, irritability or mood swings   ENT: denies - headaches, hearing change, nasal congestion, oral lesions, or sore throat   HEM/ONC denies - bleeding problems, bruising, pallor or swollen lymph nodes   ENDO: denies hot flashes, polydipsia/polyuria or temperature intolerance   RESP: denies - cough, shortness of breath or wheezing   CV: denies - chest pain, edema or palpitations, FREIRE  GI: denies - abdominal pain, change in bowel habits, constipation, diarrhea or nausea/vomiting   : denies - dysuria, hematuria, incontinence, pelvic pain   MSK: denies  - See HPI.   NEURO: denies - confusion, headaches, seizures or weakness   DERM: denies - dry skin, hair changes, rash or skin lesion changes  VASCULAR: Peripheral Vascular: No calf pain, vascular vein tenderness to calf pain              No calf throbbing, posterior knee throbbing pain     DIAGNOSTIC IMAGING     No notes on file     XR Results (most recent):  Results from [facility] Encounter encounter on 03/24/19   XR FOOT RT MIN 3 V    Narrative EXAMINATION: Right foot 3 views    INDICATION: Right foot pain, trauma    COMPARISON: None    FINDINGS: 3 views of the right foot obtained. Mild first MTP degenerative  changes. No definite acute fracture or subluxation. Small Achilles enthesophyte. Mild talonavicular dorsal spurring. Impression IMPRESSION:    No acute radiographic findings. Mild degenerative/chronic changes as above. Please see above section of this report. I have personally reviewed the results of the above study. The interpretation of this study is my professional opinion. Written by Luis Enrique Abraham, as dictated by Dr. Chip Velez. I, Dr. Chip Velez, confirm that all documentation is accurate.

## 2019-03-27 NOTE — PROGRESS NOTES
1. Have you been to the ER, urgent care clinic since your last visit? Hospitalized since your last visit? No    2. Have you seen or consulted any other health care providers outside of the [address] [place] [address] since your last visit? Include any pap smears or colon screening.  No

## 2019-04-04 ENCOUNTER — OFFICE VISIT (OUTPATIENT)
Dept: FAMILY MEDICINE CLINIC | Facility: CLINIC | Age: 44
End: 2019-04-04

## 2019-04-04 VITALS
BODY MASS INDEX: 37.94 KG/M2 | HEIGHT: 70 IN | HEART RATE: 101 BPM | TEMPERATURE: 97.8 F | RESPIRATION RATE: 16 BRPM | OXYGEN SATURATION: 97 % | WEIGHT: 265 LBS | SYSTOLIC BLOOD PRESSURE: 120 MMHG | DIASTOLIC BLOOD PRESSURE: 80 MMHG

## 2019-04-04 DIAGNOSIS — E66.01 SEVERE OBESITY (HCC): ICD-10-CM

## 2019-04-04 DIAGNOSIS — Z13.31 SCREENING FOR DEPRESSION: ICD-10-CM

## 2019-04-04 DIAGNOSIS — B18.2 HEP C W/O COMA, CHRONIC (HCC): ICD-10-CM

## 2019-04-04 DIAGNOSIS — F17.200 CONTINUOUS NICOTINE DEPENDENCE: ICD-10-CM

## 2019-04-04 DIAGNOSIS — R00.2 PALPITATIONS: Primary | ICD-10-CM

## 2019-04-04 DIAGNOSIS — G47.00 INSOMNIA, UNSPECIFIED TYPE: ICD-10-CM

## 2019-04-04 DIAGNOSIS — F20.89 OTHER SCHIZOPHRENIA (HCC): ICD-10-CM

## 2019-04-04 RX ORDER — METHADONE HYDROCHLORIDE 10 MG/1
70 TABLET ORAL DAILY
COMMUNITY
End: 2020-02-10

## 2019-04-04 NOTE — PROGRESS NOTES
Justin Fuller is a 37 y.o.  male presents today for office visit for follow up. Pt would also like to discuss physical. Pt is not fasting. Pt is in Room # 2 1. Have you been to the ER, urgent care clinic since your last visit? Hospitalized since your last visit? 3/21/19 [place] foot fracture 2. Have you seen or consulted any other health care providers outside of the [address] [place] [address] since your last visit? Include any pap smears or colon screening. Ortho 3/28/19 Upcoming App[facility] reviewed VORB:  
Orders Placed This Encounter  CBC WITH AUTOMATED DIFF  
 LIPID PANEL  
 METABOLIC PANEL, COMPREHENSIVE  
 TSH 3RD GENERATION  
 T4, FREE  
 HEMOGLOBIN A1C WITH EAG  
 HCV RT-PCR, QUANT (NON-GRAPH)  REFERRAL TO PSYCHIATRY  REFERRAL TO SLEEP STUDIES  
 WI DEPRESSION SCREEN ANNUAL  methadone (DOLOPHINE) 10 mg tablet Gaye Mac, HAYLEYN

## 2019-04-04 NOTE — PROGRESS NOTES
Internal Medicine Progress Note Today's Date:  2019 Patient:  Chuy Stephenson Patient :  1975 Subjective: Chief Complaint Patient presents with  Insomnia  Labs  Hepatitis C  
 Nicotine Dependence  Morbid Obesity Schizophrenia This is a chronic problem. This is stable. Pt takes no medication for this. Pt is not seeing a psychiatrist. +insomnia.  
  
Obesity Class II This is a chronic problem. This is not at goal. Pt tries to eat a healthy diet. Pt lost weight since the last visit. 
  
Smoker This is a chronic problem. This is not at goal. Pt smokes 1/2 pack per day. Pt is open to quitting. Hepatitis C This is a chronic problem. This is stable. Current status is unknown. Pt has not seen GI. 
3 most recent PHQ Screens 2019 PHQ Not Done - Little interest or pleasure in doing things Not at all Feeling down, depressed, irritable, or hopeless Not at all Total Score PHQ 2 0 Past Medical History:  
Diagnosis Date  Anxiety 10/11/2016  Continuous nicotine dependence 10/11/2016  Depression  Headache  Hep C w/o coma, chronic ([facility] [address] 10/11/2016  Hepatitis C   
 Insomnia 10/11/2016  Obesity, Class II, BMI 35-39.9 10/11/2016  Paranoid schizophrenia ([facility] [address]  Psychotic disorder ([facility] [address]  Schizophrenia ([facility] [address] 10/11/2016 Past Surgical History:  
Procedure Laterality Date  COLONOSCOPY N/A 3/30/2017 COLONOSCOPY performed by Raleigh Severance, MD at [address] HX APPENDECTOMY    
 
 reports that he has been smoking. He has a 1.50 pack-year smoking history. He has never used smokeless tobacco. He reports that he does not drink alcohol or use drugs. Family History Problem Relation Age of Onset  Cancer Mother 36  
     colon  Cancer Maternal Aunt   
     breast  
 Diabetes Father Allergies Allergen Reactions  Pcn [Penicillins] Hives Review of Systems CV:      chest pain, +palpitations PULM:  SOB, wheezing, cough, sputum production Current Outpatient Meds and Allergies Current Outpatient Medications on File Prior to Visit Medication Sig Dispense Refill  methadone (DOLOPHINE) 10 mg tablet Take 70 mg by mouth. No current facility-administered medications on file prior to visit. Allergies Allergen Reactions  Pcn [Penicillins] Hives Objective:    
VS:   
Visit Vitals /80 (BP 1 Location: Left arm, BP Patient Position: Sitting) Comment: manual  
Pulse (!) 101 Temp 97.8 °F (36.6 °C) (Oral) Resp 16 Ht 5' 10\" (1.778 m) Wt 265 lb (120.2 kg) SpO2 97% BMI 38.02 kg/m² General:   Well-nourished, well-groomed, pleasant, alert, in no acute distress Head:  Normocephalic, atraumatic Ears:  External ears WNL Nose:  External nares WNL Psych:  No pressured speech, no abnormal thought content Lab Results Component Value Date/Time Glucose 116 (H) 05/11/2017 06:50 PM  
 LDL, calculated 110.2 (H) 06/13/2013 10:52 AM  
 Creatinine 1.09 05/11/2017 06:50 PM  
   
Assessment/Plan & Orders: ICD-10-CM ICD-9-CM 1. Palpitations R00.2 785.1 2. Insomnia, unspecified type G47.00 780.52 REFERRAL TO SLEEP STUDIES 3. Continuous nicotine dependence F17.200 305.1 4. Severe obesity (HCC) E66.01 278.01 CBC WITH AUTOMATED DIFF  
   LIPID PANEL  
   METABOLIC PANEL, COMPREHENSIVE  
   TSH 3RD GENERATION  
   T4, FREE  
   HEMOGLOBIN A1C WITH EAG 5. Hep C w/o coma, chronic (HCC) B18.2 070.54 HCV RT-PCR, QUANT (NON-GRAPH) 6. Other schizophrenia ([facility] [address] F20.89 295.80 REFERRAL TO PSYCHIATRY 7. Screening for depression Z13.31 V79.0 [address] Information given on ketogenic/IF diet with exercise Recommend smoking cessation Follow-up and Dispositions · Return in about 1 month (around 5/2/2019) for Smoking cessation, insomnia, Weight management, Go over lab/imaging results. *Patient verbalized understanding and agreement with the plan. Patient was given an after-visit summary. Osman Palacios. Mele Lomeli MD - Internal Medicine 4/4/2019, 1:59 PM 
[facility] [address] [facility], [address] Phone [phone] Fax [phone]

## 2019-04-11 ENCOUNTER — HOSPITAL ENCOUNTER (OUTPATIENT)
Dept: LAB | Age: 44
Discharge: HOME | End: 2019-04-11

## 2019-04-11 LAB — XX-LABCORP SPECIMEN COL,LCBCF: NORMAL

## 2019-04-11 PROCEDURE — 99001 SPECIMEN HANDLING PT-LAB: CPT

## 2019-04-13 LAB
ALBUMIN SERPL-MCNC: 3.9 G/DL (ref 3.5–5.5)
ALBUMIN/GLOB SERPL: 1.2 {RATIO} (ref 1.2–2.2)
ALP SERPL-CCNC: 74 IU/L (ref 39–117)
ALT SERPL-CCNC: 21 IU/L (ref 0–44)
AST SERPL-CCNC: 21 IU/L (ref 0–40)
BASOPHILS # BLD AUTO: 0 X10E3/UL (ref 0–0.2)
BASOPHILS NFR BLD AUTO: 0 %
BILIRUB SERPL-MCNC: <0.2 MG/DL (ref 0–1.2)
BUN SERPL-MCNC: 11 MG/DL (ref 6–24)
BUN/CREAT SERPL: 10 (ref 9–20)
CALCIUM SERPL-MCNC: 8.9 MG/DL (ref 8.7–10.2)
CHLORIDE SERPL-SCNC: 98 MMOL/L (ref 96–106)
CHOLEST SERPL-MCNC: 175 MG/DL (ref 100–199)
CO2 SERPL-SCNC: 26 MMOL/L (ref 20–29)
CREAT SERPL-MCNC: 1.12 MG/DL (ref 0.76–1.27)
EOSINOPHIL # BLD AUTO: 0.1 X10E3/UL (ref 0–0.4)
EOSINOPHIL NFR BLD AUTO: 1 %
ERYTHROCYTE [DISTWIDTH] IN BLOOD BY AUTOMATED COUNT: 15.7 % (ref 12.3–15.4)
EST. AVERAGE GLUCOSE BLD GHB EST-MCNC: 143 MG/DL
GLOBULIN SER CALC-MCNC: 3.2 G/DL (ref 1.5–4.5)
GLUCOSE SERPL-MCNC: 105 MG/DL (ref 65–99)
HBA1C MFR BLD: 6.6 % (ref 4.8–5.6)
HCT VFR BLD AUTO: 37.3 % (ref 37.5–51)
HCV RNA SERPL NAA+PROBE-ACNC: NORMAL IU/ML
HDLC SERPL-MCNC: 53 MG/DL
HGB BLD-MCNC: 11.9 G/DL (ref 13–17.7)
IMM GRANULOCYTES # BLD AUTO: 0 X10E3/UL (ref 0–0.1)
IMM GRANULOCYTES NFR BLD AUTO: 0 %
INTERPRETATION, 910389: NORMAL
LDLC SERPL CALC-MCNC: 101 MG/DL (ref 0–99)
LYMPHOCYTES # BLD AUTO: 3.5 X10E3/UL (ref 0.7–3.1)
LYMPHOCYTES NFR BLD AUTO: 48 %
Lab: NORMAL
MCH RBC QN AUTO: 25.8 PG (ref 26.6–33)
MCHC RBC AUTO-ENTMCNC: 31.9 G/DL (ref 31.5–35.7)
MCV RBC AUTO: 81 FL (ref 79–97)
MONOCYTES # BLD AUTO: 0.5 X10E3/UL (ref 0.1–0.9)
MONOCYTES NFR BLD AUTO: 7 %
NEUTROPHILS # BLD AUTO: 3.3 X10E3/UL (ref 1.4–7)
NEUTROPHILS NFR BLD AUTO: 44 %
PLATELET # BLD AUTO: 257 X10E3/UL (ref 150–379)
POTASSIUM SERPL-SCNC: 4.2 MMOL/L (ref 3.5–5.2)
PROT SERPL-MCNC: 7.1 G/DL (ref 6–8.5)
RBC # BLD AUTO: 4.62 X10E6/UL (ref 4.14–5.8)
SODIUM SERPL-SCNC: 139 MMOL/L (ref 134–144)
SPECIMEN STATUS REPORT, ROLRST: NORMAL
T4 FREE SERPL-MCNC: 1 NG/DL (ref 0.82–1.77)
TEST INFORMATION: NORMAL
TRIGL SERPL-MCNC: 105 MG/DL (ref 0–149)
TSH SERPL DL<=0.005 MIU/L-ACNC: 1.7 UIU/ML (ref 0.45–4.5)
VLDLC SERPL CALC-MCNC: 21 MG/DL (ref 5–40)
WBC # BLD AUTO: 7.4 X10E3/UL (ref 3.4–10.8)

## 2019-04-23 ENCOUNTER — OFFICE VISIT (OUTPATIENT)
Dept: ORTHOPEDIC SURGERY | Age: 44
End: 2019-04-23

## 2019-04-23 VITALS
DIASTOLIC BLOOD PRESSURE: 98 MMHG | HEART RATE: 101 BPM | BODY MASS INDEX: 38.51 KG/M2 | WEIGHT: 269 LBS | RESPIRATION RATE: 16 BRPM | OXYGEN SATURATION: 96 % | SYSTOLIC BLOOD PRESSURE: 157 MMHG | TEMPERATURE: 98.9 F | HEIGHT: 70 IN

## 2019-04-23 DIAGNOSIS — M19.071 PRIMARY OSTEOARTHRITIS OF RIGHT FOOT: ICD-10-CM

## 2019-04-23 DIAGNOSIS — S90.31XD CONTUSION OF RIGHT FOOT, SUBSEQUENT ENCOUNTER: Primary | ICD-10-CM

## 2019-04-23 NOTE — PROGRESS NOTES
Dasha Rao is a 37 y.o. male (: 1975) presenting to address:    Chief Complaint   Patient presents with    Follow-up    Foot Pain     Rt foot       Medication list and allergies have been reviewed with Dasha Rao and updated as of today's date. I have gone over all Medical, Surgical and Social History with Dasha Rao and updated/added the information accordingly. 1. Have you been to the ER, Urgent Care or Hospitalized since your last visit? NO      2. Have you followed up with your PCP or any other Physicians since your procedure/ last office visit?    NO    VORB: No orders of the defined types were placed in this encounter.  Stevie Lockett MD/Noble Orellana

## 2019-04-23 NOTE — PROGRESS NOTES
AMBULATORY PROGRESS NOTE      Patient: Monico Arzola             MRN: [id number]     SSN: [id number] Body mass index is 38.6 kg/m². YOB: 1975     AGE: 37 y.o. EX: male    PCP: Dannielle Yeager MD     IMPRESSION/DIAGNOSIS AND TREATMENT PLAN     DIAGNOSES  1. Contusion of right foot, subsequent encounter    2. Primary osteoarthritis of right foot        Orders Placed This Encounter    AMB SUPPLY ORDER      Monico Arzola understands his diagnoses and the proposed plan. I was hoping he would be doing much better, but he is still having some discomfort to the dorsal part of his foot. We had discussed weightbearing films and a CT scan, which showed no evidence of instability at the midfoot, forefoot, and/or hindfoot. There is no instability but particularly to the midfoot. The plan is listed as below. Plan:    1) Discontinue using the crutches and CAM walker boot as directed. 2) DME Order: Full length Visco heel inserts. 3) Continue to use cryotherapy as directed. 4) Take OTC Tylenol as needed for pain. 5) Continue activity modification as directed. RTO - 3 weeks // PLEASE OBTAIN X-RAYS OF: right foot 3 VIEWS      HPI AND EXAMINATION     Monico Arzola IS A 37 y.o. male who presents to my outpatient office for follow up of a contusion of the right foot and primary osteoarthritis of the right foot. At the last visit, I provided an order for a short right CAM walker boot, prescribed Mobic 15 mg and Pepcid 40 mg, and instructed the patient to continue activity modification as directed. Date of injury: 03/13/2019 (approximately). Since we saw him last, Mr. Aziza Lua reports that he is still experiencing pain in his lateral right foot. He notes that he experiences daily, intermittent, throbbing pain. He reports that he stopped taking Mobic, as it caused an irregular heart beat.      Visit Vitals  BP (!) 157/98   Pulse (!) 101   Temp 98.9 °F (37.2 °C) (Oral)   Resp 16   Ht 5' 10\" (1.778 m)   Wt 269 lb (122 kg)   SpO2 96%   BMI 38.60 kg/m²     Appearance: Alert, well appearing and pleasant patient who is in no distress, oriented to person, place/time, and who follows commands. Psychiatric: Affect and mood are appropriate. Cardiovascular/Peripheral Vascular: Normal Pulses to each hand and foot  Musculoskeletal:  LOCATION:  Right FOOT/ANKLE  Integumentary: No rashes, skin patches, wounds, or abrasions to the right or left legs                             Warm and normal color. No regions of expressible drainage.                           Swelling to lateral ankle mild present    Swelling over the 5th metatarsal is present                          Nice wrinkles                          Discoloration to the anterolateral ankle, darker in color                          Swelling to medial ankle not present      Gait: uses assistive device , crutches      Tenderness: ATFL/CFL Anterolateral ankle ligaments tenderness is not present              Anterior Syndesmosis is not present               Medial deltoid ligament tenderness is not present               Peroneal tendon sheath not present               Peroneal brevis tendon insertion point tenderness is present              4 Metatarsal base tenderness is not present                Proximal half of the 5th metatarsal tenderness is present              Midfoot tenderness is not present               Achilles tenderness is not present               Cuboid tenderness is not present                     Proximal fibula tenderness: is not present      Motor Strength/Tone Exam: Normal to the toes with respect to extension/flexion      Sensory Exam:   Intact Normal Sensation to ankle/foot      Stability Testing: Peroneal tendon instability tests: not conducted due to tenderness                                    Stability of ankle and subtalar region not tested due to tenderness         ROM: Decreased ROM noted to ankle                 Decreased Hindfoot (Subtalar, CC, TN regions)                   Decreased Forefoot         Contractures: No Achilles or Gastrocnemius Contractures      Calf tenderness: Absent for calf or gastrocnemius muscle regions       Soft, supple, non tender, non taut lower extremity compartments       Alignment: Neutral Hindfoot  Wounds/Abrasions:   None present  Extremities:   No embolic phenomena to the toes or hands                          No significant edema to the foot and or toes. Lower extremities are warm and appear well perfused                          DVT: No evidence of DVT seen on examination at this time                           No calf swelling, no tenderness to calf muscles  Lymphatic:  No Evidence of Lymphedema  Vascular: Medial Border of Tibia Region: Edema is not present                   Pulses: Dorsalis Pedis &  Posterior Tibial Pulses : Palpable yes                   Varicosities Lower Limbs :  None    Neuro: Negative bilateral Straight leg raise (seated position)               See Musculoskeletal section for pertinent individual extremity examination               No abnormal hand/wrist, foot/ankle, or facial/neck tremors. CHART REVIEW     Past Medical History:   Diagnosis Date    Anxiety 10/11/2016    Continuous nicotine dependence 10/11/2016    Depression     Headache     Hep C w/o coma, chronic ([facility] [address] 10/11/2016    Hepatitis C     Insomnia 10/11/2016    Obesity, Class II, BMI 35-39.9 10/11/2016    Paranoid schizophrenia ([facility] [address]     Psychotic disorder (HCC)     Schizophrenia ([facility] [address] 10/11/2016     Current Outpatient Medications   Medication Sig    methadone (DOLOPHINE) 10 mg tablet Take 70 mg by mouth. No current facility-administered medications for this visit.       Allergies   Allergen Reactions    Pcn [Penicillins] Hives     Past Surgical History:   Procedure Laterality Date    COLONOSCOPY N/A 3/30/2017    COLONOSCOPY performed by Baljit H[facility]o MD at [facility] ENDOSCOPY    HX APPENDECTOMY  2000     Social History     Occupational History    Not on file   Tobacco Use    Smoking status: Light Tobacco Smoker     Packs/day: 0.50     Years: 3.00     Pack years: 1.50    Smokeless tobacco: Never Used   Substance and Sexual Activity    Alcohol use: No    Drug use: No     Types: Heroin     Comment: [facility]: [facility], [facility]    Sexual activity: Yes     Partners: Female     Family History   Problem Relation Age of Onset    Cancer Mother [address]        colon    Cancer Maternal Aunt         breast    Diabetes Father         REVIEW OF SYSTEMS : 4/23/2019  ALL BELOW ARE Negative except : SEE HPI     Constitutional: Negative for fever, chills and weight loss. Neg Weight Loss  Cardiovascular: Negative for chest pain, claudication and leg swelling. SOB, FREIRE   Gastrointestinal/Urological: Negative for  pain, N/V/D/C, Blood in stool or urine,dysuria                         Hematuria, Incontinence, pelvic pain  Musculoskeletal: see HPI. Neurological: Negative for dizziness and weakness, headaches,Visual Changes             Confusion,  Or Seizures,   Psychiatric/Behavioral: Negative for depression, memory loss and substance abuse. Extremities:  Negative for hair changes, rash or skin lesion changes. Hematologic: Negative for Bleeding problems, bruising, pallor or swollen lymph nodes. Peripheral Vascular: No calf pain, vascular vein tenderness to calf pain              No calf throbbing, posterior knee throbbing pain     DIAGNOSTIC IMAGING     No notes on file    Please see above section of this report. I have personally reviewed the results of the above study. The interpretation of this study is my professional opinion. Written by Kayley Sawyer, as dictated by Dr. Eric Clayton. I, Dr. Eric Clayton, confirm that all documentation is accurate.

## 2019-07-03 ENCOUNTER — OFFICE VISIT (OUTPATIENT)
Dept: FAMILY MEDICINE CLINIC | Facility: CLINIC | Age: 44
End: 2019-07-03

## 2019-07-03 VITALS
SYSTOLIC BLOOD PRESSURE: 130 MMHG | OXYGEN SATURATION: 95 % | BODY MASS INDEX: 39.08 KG/M2 | WEIGHT: 273 LBS | DIASTOLIC BLOOD PRESSURE: 80 MMHG | HEART RATE: 64 BPM | RESPIRATION RATE: 15 BRPM | TEMPERATURE: 98.8 F | HEIGHT: 70 IN

## 2019-07-03 DIAGNOSIS — R73.09 ELEVATED HEMOGLOBIN A1C: ICD-10-CM

## 2019-07-03 DIAGNOSIS — F17.200 CONTINUOUS NICOTINE DEPENDENCE: Primary | ICD-10-CM

## 2019-07-03 DIAGNOSIS — E66.01 SEVERE OBESITY (HCC): ICD-10-CM

## 2019-07-03 RX ORDER — IBUPROFEN 200 MG
TABLET ORAL
Qty: 14 PATCH | Refills: 0 | Status: SHIPPED | OUTPATIENT
Start: 2019-07-03 | End: 2019-12-18

## 2019-07-03 RX ORDER — BUPROPION HYDROCHLORIDE 150 MG/1
TABLET, EXTENDED RELEASE ORAL
Qty: 60 TAB | Refills: 4 | Status: SHIPPED | OUTPATIENT
Start: 2019-07-03 | End: 2019-10-07

## 2019-07-03 RX ORDER — NICOTINE 7MG/24HR
1 PATCH, TRANSDERMAL 24 HOURS TRANSDERMAL EVERY 24 HOURS
Qty: 14 PATCH | Refills: 0 | Status: SHIPPED | OUTPATIENT
Start: 2019-07-03 | End: 2019-07-17

## 2019-07-03 NOTE — PROGRESS NOTES
Internal Medicine Progress Note    Today's Date:  2019   Patient:  Griffin Lomeli  Patient :  1975    Subjective:     Chief Complaint   Patient presents with    Nicotine Dependence    Results    Weight Management      Schizophrenia  This is a chronic problem. This is stable. Pt takes no medication for this. Pt is not seeing a psychiatrist. +insomnia.      Obesity Class II   This is a chronic problem. This is not at goal. Pt tries to eat a healthy diet. Pt gained weight since the last visit.     Smoker   This is a chronic problem. This is not at goal. Pt smokes 1/2 pack per day. Pt is open to quitting. 3 most recent PHQ Screens 2019   PHQ Not Done -   Little interest or pleasure in doing things Not at all   Feeling down, depressed, irritable, or hopeless Not at all   Total Score PHQ 2 0      Past Medical History:   Diagnosis Date    Anxiety 10/11/2016    Continuous nicotine dependence 10/11/2016    Depression     Headache     Hep C w/o coma, chronic ([facility] 10/11/2016    Hepatitis C     Insomnia 10/11/2016    Obesity, Class II, BMI 35-39.9 10/11/2016    Paranoid schizophrenia ([facility]     Psychotic disorder ([facility]     Schizophrenia ([facility] 10/11/2016     Past Surgical History:   Procedure Laterality Date    COLONOSCOPY N/A 3/30/2017    COLONOSCOPY performed by Kahlil Ashby MD at [facility] HX APPENDECTOMY        reports that he has been smoking. He has a 1.50 pack-year smoking history. He has never used smokeless tobacco. He reports that he does not drink alcohol or use drugs.   Family History   Problem Relation Age of Onset    Cancer Mother 36        colon    Cancer Maternal Aunt         breast    Diabetes Father      Allergies   Allergen Reactions    Pcn [Penicillins] Hives     Review of Systems   CV:      chest pain, palpitations  PULM:  SOB, wheezing, cough, sputum production    Current Outpatient Meds and Allergies     Current Outpatient Medications on File Prior to Visit   Medication Sig Dispense Refill    methadone (DOLOPHINE) 10 mg tablet Take 70 mg by mouth. No current facility-administered medications on file prior to visit. Allergies   Allergen Reactions    Pcn [Penicillins] Hives     Objective:       Visit Vitals  /80 (BP 1 Location: Left arm, BP Patient Position: Sitting) Comment: manual   Pulse 64   Temp 98.8 °F (37.1 °C) (Oral)   Resp 15   Ht 5' 10\" (1.778 m)   Wt 273 lb (123.8 kg)   SpO2 95%   BMI 39.17 kg/m²     General:   Well-nourished, well-groomed, pleasant, alert, in no acute distress  Head:  Normocephalic, atraumatic  Ears:  External ears WNL  Nose:  External nares WNL  Psych:  No pressured speech, no abnormal thought content    Lab Results   Component Value Date/Time    Hemoglobin A1c 6.6 (H) 04/11/2019 08:31 AM    Glucose 105 (H) 04/11/2019 08:31 AM    LDL, calculated 101 (H) 04/11/2019 08:31 AM    Creatinine 1.12 04/11/2019 08:31 AM       Assessment/Plan & Orders:         ICD-10-CM ICD-9-CM    1. Continuous nicotine dependence F17.200 305.1 nicotine (NICODERM CQ) 14 mg/24 hr patch      nicotine (NICODERM CQ) 7 mg/24 hr      buPROPion SR (WELLBUTRIN SR) 150 mg SR tablet   2. Severe obesity ([address] E66.01 278.01    3. Elevated hemoglobin A1c R73.09 790.29 HEMOGLOBIN A1C WITH EAG       Information given on ketogenic/IF diet with exercise during a previous visit  Recommend smoking cessation    Follow-up and Dispositions    · Return in about 3 months (around 10/3/2019) for Weight management, Smoking cessation, Blood sugar check. *Patient verbalized understanding and agreement with the plan. Patient was given an after-visit summary. Kasey Garza MD - Internal Medicine  7/8/2019, 1:59 PM  [facility]  [address] [place], [address]  Phone [phone]  Fax [phone]

## 2019-08-07 ENCOUNTER — OFFICE VISIT (OUTPATIENT)
Dept: PULMONOLOGY | Age: 44
End: 2019-08-07

## 2019-08-07 VITALS
TEMPERATURE: 97.8 F | BODY MASS INDEX: 38.94 KG/M2 | HEIGHT: 70 IN | OXYGEN SATURATION: 96 % | RESPIRATION RATE: 18 BRPM | SYSTOLIC BLOOD PRESSURE: 126 MMHG | DIASTOLIC BLOOD PRESSURE: 74 MMHG | HEART RATE: 102 BPM | WEIGHT: 272 LBS

## 2019-08-07 DIAGNOSIS — R00.0 TACHYCARDIA: ICD-10-CM

## 2019-08-07 DIAGNOSIS — R06.83 SNORING: ICD-10-CM

## 2019-08-07 DIAGNOSIS — G47.19 EXCESSIVE DAYTIME SLEEPINESS: ICD-10-CM

## 2019-08-07 DIAGNOSIS — R06.81 WITNESSED EPISODE OF APNEA: ICD-10-CM

## 2019-08-07 DIAGNOSIS — F11.90 METHADONE USE: ICD-10-CM

## 2019-08-07 DIAGNOSIS — E66.9 OBESITY (BMI 30-39.9): ICD-10-CM

## 2019-08-07 DIAGNOSIS — K21.9 GASTROESOPHAGEAL REFLUX DISEASE, ESOPHAGITIS PRESENCE NOT SPECIFIED: Primary | ICD-10-CM

## 2019-08-07 RX ORDER — RANITIDINE 150 MG/1
TABLET, FILM COATED ORAL
Qty: 2 TAB | Refills: 0 | Status: SHIPPED | OUTPATIENT
Start: 2019-08-07 | End: 2019-10-07

## 2019-08-07 NOTE — PROGRESS NOTES
[facility]  Pulmonary, Critical Care, and Sleep Medicine    Office Progress Note- Initial Evaluation      Primary Care Physician: Yuliya Galindo MD     Reason for Visit:  Evaluation for possible sleep disorder    Assessment:  1. Excessive Daytime Sleepiness (EDS): possibly multifactorial etiology: VINCENT, medication?, possible mood disorder? 2. Snoring- loud and disruptive with report of gasping for air and witnessed apneas: Patient has symptoms and exam findings highly suggestive of a sleep breathing disorder, such as VINCENT. Given severity of symptoms and comorbidities additional sleep testing is indicated. 3. Tachycardia: Unclear etiology- denies anxiety, + caffeine and nicotine use, no current pain, normal thyroid functions studies. VINCENT may be playing contributing role. May need further evaluation  4. GERD: Symptomatic with report of high level reflux and food contents regurgitating into mouth. 5. History of Heroine addiction; Quit 5557-2709- on methadone maintenance program  6. Nicotine dependence: take Wellbutrin and PRN Nicotine patches- patient reports not smoking when wearing nicotine patch  7. H/O Hepatitis C- treated with Harvoni  8. Obesity: Body mass index is 39.03 kg/m². 9. Possible mood/psychiatric disorder: Patient with prior report of Schizophrenia, and anxiety- states he has not been on meds for several years. Denies any visual or auditory hallucinations. Plan:  ·   · Schedule patient for home sleep study for further evaluation. · Start patient on trial of Zantac and monitor for response- may need further workup. · Potential consequences of untreated sleep apnea, and/or excessive daytime sleepiness were discussed with the patient. · Educational materials provided. · Treatment options including CPAP, dental appliance, weight reduction measures, positional therapy, surgeries etc were discussed.   · Healthy lifestyle changes to include weight loss and exercise discussed. · Healthy sleep habits were reviewed and encouraged. ·  and workplace safety reviewed and discussed as appropriate. Drowsy and/or inattentive driving should be avoided. · Follow-up in after sleep testing, sooner should new symptoms or problems arise. · Follow up with Primary Care Provider (PCP) as directed and for routine health care maintenance. History of Present Illness: Mr. Mai Wolf is a 40 y.o. male patient who presents for evaluation of loud snoring and witnessed apneas. The history was provided by the patient. Occupation:   On Disability; [employer]. Driving:  yes  Drowsy Driving: is reported on drives longer than one hour. He is limiting his driving. Motor vehicle accident(s) associated with drowsy driving have not occurred. Snoring: This is a Chronic problem which has been ongoing for years. His snoring is loud and disruptive and can be heard through closed doors and walls. He alsoreports waking up gasping. Witnessed apneas are reported. His fiance will shake him awake because he stopped breathing. Fatigue: This is a Chronic problem which has been ongoing for years. Dental: Teeth clenching or grindingis not reported. Naps: are reported and occur spontaneously sitting in a chair or on the couch. This normally occurs between 2995-2491. Leg Symptoms/Pain: He does not have unpleasant or crawling sensation in legs or strong urge to move when inactive. He does have some chronic right pain. GERD: is reported and does disturb his sleep. Typically happens after eating spicy foods; 2-3 times/ week. He will take OTC and Rolaids. He does note some rare water brash and food contents refluxing into his mouth while sleeping. Mood: He feels that he is happy but his fiance has told him that he is gee. Patient has a prior history of Schizophrenia, depression and anxiety.  He states he was seen by mental health several years ago when he was still using heroin. He has not taken medication for years, although he is taking Wellbutrin. He recently started going to [facility]\" for counseling. He denies any auditory or visual hallucinations. He denies feeling anxious. Sleep-Wake History:       Estimates sleeping approximately 4-5 hours per night/day. Reports sleeping in a Bed with 2 pillows. He gets into bed at approximately 2200. Once in bed,he watches some TV until he dozes off. It usually takes up to 30 minutes to fall asleep after going to bed. He is able to sleep until about 0231-8940 and then he is gasping awake. Reports waking up to use the bathroom 1 time nightly. Pain, typically does not disturb their sleep. Vivid dreams are reported but that is rare. He normally awakens without an alarm to start their day at 8371-4982. He  typically gets out of bed at that time . Waking up with a morning headache is reported. Awakening with a dry mouth is reported. Symptom(s) suggestive of cataplexy are not reported. Sleep paralysis is reported once in the distant past.    Hypnagogic and/or hypnopompic hallucinations are not. Reported. Sleep walking is not  reported. Sleep talking is not  reported. Other unusual and/or parasomnia behaviors are not reported. Family Sleep History:   - Brother: VINCENT        Joy Flower 8/7/2019   Does the patient snore loudly (louder than talking or loud enough to be heard through closed doors)? 1   Does the patient often feel tired, fatigued, or sleepy during the daytime, even after a \"good\" night's sleep? 1   Has anyone ever observed the patient stop breathing during their sleep? 1   Does the patient have or are they being treated for high blood pressure? 0   Is the patient's BMI greater than 35? 1   Is your neck circumference greater than 17 inches (Male) or 16 inches (Female)? 1   Is the patient older than 48? 0   Is the patient male?  1 VINCENT Score 6       3 most recent PHQ Screens 4/4/2019   PHQ Not Done -   Little interest or pleasure in doing things Not at all   Feeling down, depressed, irritable, or hopeless Not at all   Total Score PHQ 2 0       [place] Scale 8/7/2019   Sitting and Reading 1   Watching TV 3   Sitting, inactive in a public place (e.g. a movie theater or meeting) 1   As a passenger in a car for an hour, without a break 3   Lying down to rest in the afternoon, when circumstances permit 1   Sitting and talking to someone 1   Sitting quietly after lunch without alcohol 1   In a car, while stopped for a few minutes in traffic 0   [place] Sleepiness Score 11        Neck circ.  in \"inches\": 23         Past Medical History:  Past Medical History:   Diagnosis Date    Anxiety 10/11/2016    Continuous nicotine dependence 10/11/2016    Depression     Headache     Hep C w/o coma, chronic ([facility] 10/11/2016    Hepatitis C     Insomnia 10/11/2016    Obesity, Class II, BMI 35-39.9 10/11/2016    Paranoid schizophrenia ([facility]     Psychotic disorder ([facility]     Schizophrenia ([facility] 10/11/2016       Past Surgical History:  Past Surgical History:   Procedure Laterality Date    COLONOSCOPY N/A 3/30/2017    COLON[facility] performed by Mo [facility]ammell MD at [facility] ENDOSCOPY    HX APPENDECTOMY  2000       Family History:  Family History   Problem Relation Age of Onset    Cancer Mother 36        colon    Cancer Maternal Aunt         breast    Diabetes Father        Social History:     Caffeine Amount Time of last Intake Comments   Coffee 1 C/am     Soda Rare     Tea 2-3 bottles/day  Sweet tea   Energy Drinks None     Over- the - counter stimulant pills None     Other Substances      Alcohol Rare  Vodka   Tobacco 3/4 PPD  3-4 years, ilan Cigarettes    Drugs Prior Heroin Quit 2014-15 On methadone x 5 years       Medications:  Current Outpatient Medications on File Prior to Visit   Medication Sig Dispense Refill    nicotine (NICODERM CQ) 14 mg/24 hr patch 1 patch by transdermal route every 24 hours on weeks 3 and 4 14 Patch 0    buPROPion SR (WELLBUTRIN SR) 150 mg SR tablet T1 tab po daily x 3 days then BID 60 Tab 4    methadone (DOLOPHINE) 10 mg tablet Take 70 mg by mouth daily. No current facility-administered medications on file prior to visit. - Patient now on methadone liquid and is taking 55 mg daily,    Allergy:  Allergies   Allergen Reactions    Pcn [Penicillins] Hives       Review of Systems  General ROS: positive for  - fatigue and sleep disturbance, + weight gain  negative for - chills, fever, hot flashes, malaise, night sweats or weight loss  ENT ROS: negative  Hematological and Lymphatic ROS: negative for - bleeding problems, blood clots, bruising, jaundice, pallor or swollen lymph nodes  Endocrine ROS: negative for - polydipsia/polyuria, skin changes, temperature intolerance or unexpected weight changes  Respiratory ROS: no cough, shortness of breath, or wheezing  Cardiovascular ROS: positive for - rapid heart rate  negative for - chest pain, dyspnea on exertion, edema, loss of consciousness, murmur, orthopnea or shortness of breath  Gastrointestinal ROS: no abdominal pain, change in bowel habits, or black or bloody stools  Genito-Urinary ROS: no dysuria, trouble voiding, or hematuria  Musculoskeletal ROS: as above  Neurological ROS: no TIA or stroke symptoms  Dermatological ROS: negative for - pruritus, rash or skin lesion changes   Psychological ROS: as above   Otherwise negative. Physical Exam:    Blood pressure 126/74, pulse (!) 102, temperature 97.8 °F (36.6 °C), temperature source Oral, resp. rate 18, height 5' 10\" (1.778 m), weight 123.4 kg (272 lb), SpO2 96 %. on RA, Body mass index is 39.03 kg/m².      General: No distress, acyanotic, appears stated age, cooperative, pleasant  HEENT: PERRL, EOMI, throat without erythema or exudate, Tongue- large,  dental indention on tongue, + partial dentures, Mallampati's score 4+, Uvula- widen, + edema, dependent into hypopharynx- midline, Tonsils- not seen. Neck: Supple,  no abnormally enlarged lymph nodes, thyroid is not enlarged, non-tender, No JVD, No carotid bruits  Chest: Normal.  Lungs: Moderate air entry, clear to auscultation bilaterally,   Heart:  Regular rhythm, rapid rate; low 100 bpm, S1S2 present, without murmur. Abdomen: Obese, bowel sounds normoactive, abdomen is soft without significant tenderness, or guarding. Extremity: Negative for cyanosis, edema, or clubbing. Skin: Skin color, texture, turgor normal. No rashes or lesions. Neurological: CN 2-12 grossly intact, normal muscle tone. Data Reviewed:  CBC:   Lab Results   Component Value Date/Time    WBC 7.4 04/11/2019 08:31 AM    HGB 11.9 (L) 04/11/2019 08:31 AM    HCT 37.3 (L) 04/11/2019 08:31 AM    PLATELET 947 25/40/3935 08:31 AM    MCV 81 04/11/2019 08:31 AM       BMP:   Lab Results   Component Value Date/Time    Sodium 139 04/11/2019 08:31 AM    Potassium 4.2 04/11/2019 08:31 AM    Chloride 98 04/11/2019 08:31 AM    CO2 26 04/11/2019 08:31 AM    Anion gap 7 05/11/2017 06:50 PM    Glucose 105 (H) 04/11/2019 08:31 AM    BUN 11 04/11/2019 08:31 AM    Creatinine 1.12 04/11/2019 08:31 AM    BUN/Creatinine ratio 10 04/11/2019 08:31 AM    GFR est AA 93 04/11/2019 08:31 AM    GFR est non-AA 80 04/11/2019 08:31 AM    Calcium 8.9 04/11/2019 08:31 AM        TSH:  Lab Results   Component Value Date/Time    TSH 1.700 04/11/2019 08:31 AM    TSH 1.58 06/13/2013 10:52 AM       Imaging:  [x]I have personally reviewed the patients radiographs section     CXR: 5/11/17  EXAM: Chest, PA and lateral     INDICATION: Cough, fever     COMPARISON: 3/19/2017     FINDINGS:     Cardiac silhouette and pulmonary vessels are normal. Lung volumes are low with  crowded basal markings. No acute consolidation. Costophrenic angles are sharp. No pneumothorax. No change.     IMPRESSION:     No active disease or interval change.     Results from Hospital Encounter encounter on 03/24/19   XR FOOT RT MIN 3 V    Narrative EXAMINATION: Right foot 3 views    INDICATION: Right foot pain, trauma    COMPARISON: None    FINDINGS: 3 views of the right foot obtained. Mild first MTP degenerative  changes. No definite acute fracture or subluxation. Small Achilles enthesophyte. Mild talonavicular dorsal spurring. Impression IMPRESSION:    No acute radiographic findings. Mild degenerative/chronic changes as above. No results found for this or any previous visit. Historical Sleep Testing Data: No Testing To Date.           Luis Miguel López DO, [facility]  Pulmonary, Sleep and Critical Care Medicine

## 2019-08-07 NOTE — LETTER
8/7/19 Patient: Moisés Munoz YOB: 1975 Date of Visit: 8/7/2019 Dimitris Dior MD 
[id number] [facility] [phone] VIA In Basket Dear Dimitris Dior MD, Thank you for referring Mr. Moisés Munoz to [facility] PULMONARY [facility] for evaluation. My notes for this consultation are attached. If you have questions, please do not hesitate to call me. I look forward to following your patient along with you.  
 
 
Sincerely, 
 
Chava Miguel, DO

## 2019-08-07 NOTE — PROGRESS NOTES
Griffin Lomeli presents today for   Chief Complaint   Patient presents with   [facility] Referral / Consult     referred by Dr. Zak Mancia for sleep evaluation d/t insomnia       Is someone accompanying this pt? No    Is the patient using any DME equipment during OV? No    -DME Company N/A    Depression Screening:  3 most recent PHQ Screens 4/4/2019   PHQ Not Done -   Little interest or pleasure in doing things Not at all   Feeling down, depressed, irritable, or hopeless Not at all   Total Score PHQ 2 0       Learning Assessment:  Learning Assessment 10/11/2016   PRIMARY LEARNER Patient   PRIMARY LANGUAGE ENGLISH   LEARNER PREFERENCE PRIMARY VIDEOS   ANSWERED BY patient   RELATIONSHIP SELF       Abuse Screening:  Abuse Screening Questionnaire 4/4/2019   Do you ever feel afraid of your partner? N   Are you in a relationship with someone who physically or mentally threatens you? N   Is it safe for you to go home? Y       Fall Risk  No flowsheet data found. Coordination of Care:  1. Have you been to the ER, urgent care clinic since your last visit? Hospitalized since your last visit? Yes; Where: [facility] ED, When: 3/24/2019-right foot fracture    2. Have you seen or consulted any other health care providers outside of the [address] [place] [address] since your last visit? Include any pap smears or colon screening.  No

## 2019-08-23 ENCOUNTER — TELEPHONE (OUTPATIENT)
Dept: PULMONOLOGY | Age: 44
End: 2019-08-23

## 2019-08-23 NOTE — TELEPHONE ENCOUNTER
PT CAME ([phone]). STILL HAS NOT HEARD FROM ANYONE REGARDING HIS CPAP AND SUPPLIES. PLEASE CHECK AND CALL HIM BACK.

## 2019-08-23 NOTE — TELEPHONE ENCOUNTER
Patient calling re home sleep study he was to be getting done and states he hasnt been called. In system I see the scheduled study 8/27/19 for pickup and 8/28/19 to return. I called the sleep lab and spoke with Gail who states she was the one who talked to the patient and verified the times and dates with the patient. She will call the patient back to clarify the dates and times with the patient again.

## 2019-08-27 ENCOUNTER — HOSPITAL ENCOUNTER (OUTPATIENT)
Dept: SLEEP MEDICINE | Age: 44
Discharge: HOME | End: 2019-08-27

## 2019-08-27 DIAGNOSIS — G47.19 EXCESSIVE DAYTIME SLEEPINESS: ICD-10-CM

## 2019-08-27 DIAGNOSIS — R06.83 SNORING: ICD-10-CM

## 2019-08-27 DIAGNOSIS — R00.0 TACHYCARDIA: ICD-10-CM

## 2019-08-27 DIAGNOSIS — E66.9 OBESITY (BMI 30-39.9): ICD-10-CM

## 2019-08-27 DIAGNOSIS — R06.81 WITNESSED EPISODE OF APNEA: ICD-10-CM

## 2019-08-27 DIAGNOSIS — K21.9 GASTROESOPHAGEAL REFLUX DISEASE, ESOPHAGITIS PRESENCE NOT SPECIFIED: ICD-10-CM

## 2019-08-27 DIAGNOSIS — F11.90 METHADONE USE: ICD-10-CM

## 2019-08-29 DIAGNOSIS — G47.33 OSA (OBSTRUCTIVE SLEEP APNEA): Primary | ICD-10-CM

## 2019-08-29 NOTE — PROGRESS NOTES
The patient underwent sleep testing on 8/28/19. Recommendations listed below. Please refer to full report for specific details. Interpretation   Severe Obstructive Sleep Apnea (VINCENT)- AHI: 41.7   The were several non-scorable oxygen desaturation episodes. As such the severity of  VINCENT may be underestimated by this study.  The heart range was 61 to 110 bpm. The average was 87 bpm   The Oxygen Desaturation Index (OSI) was 47.9 and the SpO2 fam for this study was  69%   The heart rate range between with a mean of   Start APAP 10/20/cwp.  Other non-invasive treatment options are recommended were applicable and include  the following: weight reduction, smoking cessation, body position training, and modification of  alcohol ingestion and/or sedating agents.  Healthy sleep habit education and reinforcement will be reviewed with the patient.  Individuals are encouraged to obtain 7-9 hours of sleep per day.   safety is encouraged. Drowsy and/or inattentive driving should be avoided.  Follow up with referring provider as directed.     Carine Robles, DO, [facility]    [address] [facility]  Pulmonary, Critical Care, and Sleep Medicine

## 2019-10-07 ENCOUNTER — HOSPITAL ENCOUNTER (EMERGENCY)
Age: 44
Discharge: HOME | End: 2019-10-07
Attending: EMERGENCY MEDICINE
Payer: MEDICAID

## 2019-10-07 VITALS
BODY MASS INDEX: 38.65 KG/M2 | WEIGHT: 270 LBS | TEMPERATURE: 99.1 F | HEART RATE: 99 BPM | OXYGEN SATURATION: 98 % | RESPIRATION RATE: 12 BRPM | DIASTOLIC BLOOD PRESSURE: 83 MMHG | HEIGHT: 70 IN | SYSTOLIC BLOOD PRESSURE: 123 MMHG

## 2019-10-07 DIAGNOSIS — R00.2 PALPITATIONS: Primary | ICD-10-CM

## 2019-10-07 DIAGNOSIS — R03.0 ELEVATED BLOOD PRESSURE READING: ICD-10-CM

## 2019-10-07 LAB
ANION GAP SERPL CALC-SCNC: 8 MMOL/L (ref 3–18)
ATRIAL RATE: 111 BPM
BASOPHILS # BLD: 0 K/UL (ref 0–0.1)
BASOPHILS NFR BLD: 0 % (ref 0–2)
BUN SERPL-MCNC: 10 MG/DL (ref 7–18)
BUN/CREAT SERPL: 10 (ref 12–20)
CALCIUM SERPL-MCNC: 8.9 MG/DL (ref 8.5–10.1)
CALCULATED P AXIS, ECG09: 61 DEGREES
CALCULATED R AXIS, ECG10: 31 DEGREES
CALCULATED T AXIS, ECG11: 33 DEGREES
CHLORIDE SERPL-SCNC: 103 MMOL/L (ref 100–111)
CO2 SERPL-SCNC: 30 MMOL/L (ref 21–32)
CREAT SERPL-MCNC: 1.03 MG/DL (ref 0.6–1.3)
DIAGNOSIS, 93000: NORMAL
DIFFERENTIAL METHOD BLD: ABNORMAL
EOSINOPHIL # BLD: 0.1 K/UL (ref 0–0.4)
EOSINOPHIL NFR BLD: 1 % (ref 0–5)
ERYTHROCYTE [DISTWIDTH] IN BLOOD BY AUTOMATED COUNT: 15.3 % (ref 11.6–14.5)
GLUCOSE SERPL-MCNC: 148 MG/DL (ref 74–99)
HCT VFR BLD AUTO: 38.4 % (ref 36–48)
HGB BLD-MCNC: 12.4 G/DL (ref 13–16)
LYMPHOCYTES # BLD: 3.3 K/UL (ref 0.9–3.6)
LYMPHOCYTES NFR BLD: 38 % (ref 21–52)
MAGNESIUM SERPL-MCNC: 1.9 MG/DL (ref 1.6–2.6)
MCH RBC QN AUTO: 25.1 PG (ref 24–34)
MCHC RBC AUTO-ENTMCNC: 32.3 G/DL (ref 31–37)
MCV RBC AUTO: 77.6 FL (ref 74–97)
MONOCYTES # BLD: 0.6 K/UL (ref 0.05–1.2)
MONOCYTES NFR BLD: 7 % (ref 3–10)
NEUTS SEG # BLD: 4.6 K/UL (ref 1.8–8)
NEUTS SEG NFR BLD: 54 % (ref 40–73)
P-R INTERVAL, ECG05: 132 MS
PLATELET # BLD AUTO: 246 K/UL (ref 135–420)
PMV BLD AUTO: 8.7 FL (ref 9.2–11.8)
POTASSIUM SERPL-SCNC: 3.9 MMOL/L (ref 3.5–5.5)
Q-T INTERVAL, ECG07: 366 MS
QRS DURATION, ECG06: 84 MS
QTC CALCULATION (BEZET), ECG08: 497 MS
RBC # BLD AUTO: 4.95 M/UL (ref 4.7–5.5)
SODIUM SERPL-SCNC: 141 MMOL/L (ref 136–145)
TSH SERPL DL<=0.05 MIU/L-ACNC: 1.36 UIU/ML (ref 0.36–3.74)
VENTRICULAR RATE, ECG03: 111 BPM
WBC # BLD AUTO: 8.7 K/UL (ref 4.6–13.2)

## 2019-10-07 PROCEDURE — 80048 BASIC METABOLIC PNL TOTAL CA: CPT

## 2019-10-07 PROCEDURE — 84443 ASSAY THYROID STIM HORMONE: CPT

## 2019-10-07 PROCEDURE — 99285 EMERGENCY DEPT VISIT HI MDM: CPT

## 2019-10-07 PROCEDURE — 83735 ASSAY OF MAGNESIUM: CPT

## 2019-10-07 PROCEDURE — 85025 COMPLETE CBC W/AUTO DIFF WBC: CPT

## 2019-10-07 PROCEDURE — 93005 ELECTROCARDIOGRAM TRACING: CPT

## 2019-10-07 NOTE — ED PROVIDER NOTES
EMERGENCY DEPARTMENT HISTORY AND PHYSICAL EXAM    9:20 AM      Date: 10/7/2019  Patient Name: Barby Wen    History of Presenting Illness     Chief Complaint   Patient presents with    Palpitations         History Provided By: Patient    Additional History (Context): Barby Wen is a 40 y.o. male with Past medical history of anxiety, depression, insomnia, paranoid schizophrenia who presents with chief complaint of palpitations intermittently for the past month. Patient states that he can just be resting in feels his heart skipping beats. He does admit to having history of anxiety but states that when he gets the palpitation it feels different. He denies any chest pain, shortness of breath, leg pain leg swelling, dizziness, abdominal pain, and no other complaint.      PCP: Charly Trotter MD        Past History     Past Medical History:  Past Medical History:   Diagnosis Date    Anxiety 10/11/2016    Continuous nicotine dependence 10/11/2016    Depression     Headache     Hep C w/o coma, chronic ([facility] [address] 10/11/2016    Hepatitis C     Insomnia 10/11/2016    Obesity, Class II, BMI 35-39.9 10/11/2016    Paranoid schizophrenia ([facility] [address]     Psychotic disorder ([facility] [address]     Schizophrenia ([facility] [address] 10/11/2016       Past Surgical History:  Past Surgical History:   Procedure Laterality Date    COLONOSCOPY N/A 3/30/2017    COLONOSCOPY performed by Vianey Mercer MD at [facility] END[facility]    HX APPENDECTOMY  2000       Family History:  Family History   Problem Relation Age of Onset    Cancer Mother 36        colon    Cancer Maternal Aunt         breast    Diabetes Father        Social History:  Social History     Tobacco Use    Smoking status: Current Every Day Smoker     Packs/day: 0.50     Years: 4.00     Pack years: 2.00    Smokeless tobacco: Never Used   Substance Use Topics    Alcohol use: Yes     Comment: 6 pack/week    Drug use: No     Types: Heroin     Comment: [facility] Allergies: Allergies   Allergen Reactions    Pcn [Penicillins] Hives         Review of Systems       Review of Systems   Constitutional: Negative for chills and fever. HENT: Negative for congestion, rhinorrhea, sore throat and trouble swallowing. Eyes: Negative for visual disturbance. Respiratory: Negative for cough, shortness of breath and wheezing. Cardiovascular: Positive for palpitations. Negative for chest pain and leg swelling. Gastrointestinal: Negative for abdominal pain, nausea and vomiting. Endocrine: Negative for polyuria. Genitourinary: Negative for difficulty urinating and dysuria. Musculoskeletal: Negative for arthralgias and neck stiffness. Skin: Negative for rash. Neurological: Negative for dizziness, weakness, numbness and headaches. Hematological: Does not bruise/bleed easily. Psychiatric/Behavioral: Negative for confusion and dysphoric mood. All other systems reviewed and are negative. Physical Exam     Visit Vitals  BP (!) 151/98 (BP 1 Location: Left arm)   Pulse (!) 106   Temp 99.1 °F (37.3 °C)   Resp 20   Ht 5' 10\" (1.778 m)   Wt 122.5 kg (270 lb)   SpO2 97%   BMI 38.74 kg/m²         Physical Exam   Constitutional: He is oriented to person, place, and time. He appears well-developed and well-nourished. No distress. HENT:   Head: Normocephalic and atraumatic. Mouth/Throat: Oropharynx is clear and moist.   Eyes: Pupils are equal, round, and reactive to light. Conjunctivae are normal. No scleral icterus. Neck: Normal range of motion. Neck supple. No tracheal deviation present. No thyromegaly present. Cardiovascular: Intact distal pulses. Capillary refill < 3 seconds  Tachycardia, heart rate 107 on the monitor as I am at the bedside   Pulmonary/Chest: Effort normal and breath sounds normal. No respiratory distress. He has no wheezes. Abdominal: Soft. Bowel sounds are normal. He exhibits no distension. There is no tenderness.    Musculoskeletal: Normal range of motion. He exhibits no edema or tenderness. No lower extremity edema   Lymphadenopathy:     He has no cervical adenopathy. Neurological: He is alert and oriented to person, place, and time. No cranial nerve deficit. He exhibits normal muscle tone. Coordination normal.   Skin: Skin is warm and dry. He is not diaphoretic. Psychiatric: He has a normal mood and affect. His behavior is normal. Judgment and thought content normal.   Nursing note and vitals reviewed. Diagnostic Study Results     Labs -  Recent Results (from the past 12 hour(s))   EKG, 12 LEAD, INITIAL    Collection Time: 10/07/19  9:18 AM   Result Value Ref Range    Ventricular Rate 111 BPM    Atrial Rate 111 BPM    P-R Interval 132 ms    QRS Duration 84 ms    Q-T Interval 366 ms    QTC Calculation (Bezet) 497 ms    Calculated P Axis 61 degrees    Calculated R Axis 31 degrees    Calculated T Axis 33 degrees    Diagnosis       Sinus tachycardia  Otherwise normal ECG  When compared with ECG of 17-NOV-2008 07:36,  Nonspecific T wave abnormality, improved in Inferior leads  Nonspecific T wave abnormality no longer evident in Anterolateral leads  Confirmed by Renate Ervin ([id number]) on 10/7/2019 10:59:13 AM     CBC WITH AUTOMATED DIFF    Collection Time: 10/07/19  9:25 AM   Result Value Ref Range    WBC 8.7 4.6 - 13.2 K/uL    RBC 4.95 4.70 - 5.50 M/uL    HGB 12.4 (L) 13.0 - 16.0 g/dL    HCT 38.4 36.0 - 48.0 %    MCV 77.6 74.0 - 97.0 FL    MCH 25.1 24.0 - 34.0 PG    MCHC 32.3 31.0 - 37.0 g/dL    RDW 15.3 (H) 11.6 - 14.5 %    PLATELET 754 735 - 610 K/uL    MPV 8.7 (L) 9.2 - 11.8 FL    NEUTROPHILS 54 40 - 73 %    LYMPHOCYTES 38 21 - 52 %    MONOCYTES 7 3 - 10 %    EOSINOPHILS 1 0 - 5 %    BASOPHILS 0 0 - 2 %    ABS. NEUTROPHILS 4.6 1.8 - 8.0 K/UL    ABS. LYMPHOCYTES 3.3 0.9 - 3.6 K/UL    ABS. MONOCYTES 0.6 0.05 - 1.2 K/UL    ABS. EOSINOPHILS 0.1 0.0 - 0.4 K/UL    ABS.  BASOPHILS 0.0 0.0 - 0.1 K/UL    DF AUTOMATED     METABOLIC PANEL, BASIC Collection Time: 10/07/19  9:25 AM   Result Value Ref Range    Sodium 141 136 - 145 mmol/L    Potassium 3.9 3.5 - 5.5 mmol/L    Chloride 103 100 - 111 mmol/L    CO2 30 21 - 32 mmol/L    Anion gap 8 3.0 - 18 mmol/L    Glucose 148 (H) 74 - 99 mg/dL    BUN 10 7.0 - 18 MG/DL    Creatinine 1.03 0.6 - 1.3 MG/DL    BUN/Creatinine ratio 10 (L) 12 - 20      GFR est AA >60 >60 ml/min/1.73m2    GFR est non-AA >60 >60 ml/min/1.73m2    Calcium 8.9 8.5 - 10.1 MG/DL   MAGNESIUM    Collection Time: 10/07/19  9:25 AM   Result Value Ref Range    Magnesium 1.9 1.6 - 2.6 mg/dL   TSH 3RD GENERATION    Collection Time: 10/07/19  9:25 AM   Result Value Ref Range    TSH 1.36 0.36 - 3.74 uIU/mL       Radiologic Studies -   No orders to display         Medical Decision Making   I am the first provider for this patient. I reviewed the vital signs, available nursing notes, past medical history, past surgical history, family history and social history. Vital Signs-Reviewed the patient's vital signs. EKG: Interpreted by the EP Dr. Jung Shah. Time Interpreted: 9:18 AM   Rate: 111   Rhythm: Sinus Tachycardia    Interpretation: Normal QRS duration, prolonged QTC, no ST elevation, no ST depressions       Records Reviewed: Nursing Notes and Old Medical Records (Time of Review: 9:20 AM)    Provider Notes (Medical Decision Making): DDX: Arrhythmia, anxiety, metabolic    Labs, EKG, on a monitor  MDM    Medications - No data to display        ED Course: Progress Notes, Reevaluation, and Consults:  Labs reassuring    Reassessed patient, resting comfortably no new complaint. We will have him follow-up with cardiologist    I have reassessed the patient. I have discussed the workup, results and plan with the patient and patient is in agreement. Patient is feeling better. Patient was discharge in stable condition. Patient was given outpatient follow up. Patient is to return to emergency department if any new or worsening condition. Diagnosis     Clinical Impression:   1. Palpitations        Disposition: Discharged    Follow-up Information    None          Patient's Medications   Start Taking    No medications on file   Continue Taking    METHADONE (DOLOPHINE) 10 MG TABLET    Take 70 mg by mouth daily. Indications: 65mg    NICOTINE (NICODERM CQ) 14 MG/24 HR PATCH    1 patch by transdermal route every 24 hours on weeks 3 and 4   These Medications have changed    No medications on file   Stop Taking    BUPROPION SR (WELLBUTRIN SR) 150 MG SR TABLET    T1 tab po daily x 3 days then BID    RANITIDINE (ZANTAC) 150 MG TABLET    150 mg to be taken 7, and 1 hour prior to study         DO Tony Salcedo medical dictation software was used for portions of this report. Unintended transcription errors may occur. My signature above authenticates this document and my orders, the final    diagnosis (es), discharge prescription (s), and instructions in the Epic    record.

## 2019-10-07 NOTE — ED NOTES
Assumed care of patient. No ss distress. Sitting up on stretcher, watching TV and talking on cell phone. Will continue to monitor closely while awaiting further orders.

## 2019-10-07 NOTE — ED TRIAGE NOTES
Pt states he been feeling like his heart skips a beat. Has been off and on for one month.  Denies pain

## 2019-10-07 NOTE — DISCHARGE INSTRUCTIONS
Elevated Blood Pressure: Care Instructions  Your Care Instructions    Blood pressure is a measure of how hard the blood pushes against the walls of your arteries. It's normal for blood pressure to go up and down throughout the day. But if it stays up over time, you have high blood pressure. Two numbers tell you your blood pressure. The first number is the systolic pressure. It shows how hard the blood pushes when your heart is pumping. The second number is the diastolic pressure. It shows how hard the blood pushes between heartbeats, when your heart is relaxed and filling with blood. An ideal blood pressure in adults is less than 120/80 (say \"120 over 80\"). High blood pressure is 140/90 or higher. You have high blood pressure if your top number is 140 or higher or your bottom number is 90 or higher, or both. The main test for high blood pressure is simple, fast, and painless. To diagnose high blood pressure, your doctor will test your blood pressure at different times. After testing your blood pressure, your doctor may ask you to test it again when you are home. If you are diagnosed with high blood pressure, you can work with your doctor to make a long-term plan to manage it. Follow-up care is a key part of your treatment and safety. Be sure to make and go to all appointments, and call your doctor if you are having problems. It's also a good idea to know your test results and keep a list of the medicines you take. How can you care for yourself at home? · Do not smoke. Smoking increases your risk for heart attack and stroke. If you need help quitting, talk to your doctor about stop-smoking programs and medicines. These can increase your chances of quitting for good. · Stay at a healthy weight. · Try to limit how much sodium you eat to less than 2,300 milligrams (mg) a day. Your doctor may ask you to try to eat less than 1,500 mg a day. · Be physically active.  Get at least 30 minutes of exercise on most days of the week. Walking is a good choice. You also may want to do other activities, such as running, swimming, cycling, or playing tennis or team sports. · Avoid or limit alcohol. Talk to your doctor about whether you can drink any alcohol. · Eat plenty of fruits, vegetables, and low-fat dairy products. Eat less saturated and total fats. · Learn how to check your blood pressure at home. When should you call for help? Call your doctor now or seek immediate medical care if:  ? · Your blood pressure is much higher than normal (such as 180/110 or higher). ? · You think high blood pressure is causing symptoms such as:  ¨ Severe headache. ¨ Blurry vision. ? Watch closely for changes in your health, and be sure to contact your doctor if:  ? · You do not get better as expected. Where can you learn more? Go to http://[employer] Studiostaylor.info/. Enter H806 in the search box to learn more about \"Elevated Blood Pressure: Care Instructions. \"  Current as of: September 21, 2016  Content Version: 11.4  © 1152-1562 [employer]. Care instructions adapted under license by [employer] (which disclaims liability or warranty for this information). If you have questions about a medical condition or this instruction, always ask your healthcare professional. [address] any warranty or liability for your use of this information. Patient Education        Palpitations: Care Instructions  Your Care Instructions    Heart palpitations are the uncomfortable sensation that your heart is beating fast or irregularly. You might feel pounding or fluttering in your chest. It might feel like your heart is skipping a beat. Although palpitations may be caused by a heart problem, they also occur because of stress, fatigue, or use of alcohol, caffeine, or nicotine.  Many medicines, including diet pills, antihistamines, decongestants, and some herbal products, can cause heart palpitations. Nearly everyone has palpitations from time to time. Depending on your symptoms, your doctor may need to do more tests to try to find the cause of your palpitations. Follow-up care is a key part of your treatment and safety. Be sure to make and go to all appointments, and call your doctor if you are having problems. It's also a good idea to know your test results and keep a list of the medicines you take. How can you care for yourself at home? · Avoid caffeine, nicotine, and excess alcohol. · Do not take illegal drugs, such as methamphetamines and cocaine. · Do not take weight loss or diet medicines unless you talk with your doctor first.  · Get plenty of sleep. · Do not overeat. · If you have palpitations again, take deep breaths and try to relax. This may slow a racing heart. · If you start to feel lightheaded, lie down to avoid injuries that might result if you pass out and fall down. · Keep a record of your palpitations and bring it to your next doctor's appointment. Write down:  ? The date and time. ? Your pulse. (If your heart is beating fast, it may be hard to count your pulse.)  ? What you were doing when the palpitations started. ? How long the palpitations lasted. ? Any other symptoms. · If an activity causes palpitations, slow down or stop. Talk to your doctor before you do that activity again. · Take your medicines exactly as prescribed. Call your doctor if you think you are having a problem with your medicine. When should you call for help? Call 911 anytime you think you may need emergency care. For example, call if:    · You passed out (lost consciousness).     · You have symptoms of a heart attack. These may include:  ? Chest pain or pressure, or a strange feeling in the chest.  ? Sweating. ? Shortness of breath. ? Pain, pressure, or a strange feeling in the back, neck, jaw, or upper belly or in one or both shoulders or arms. ? Lightheadedness or sudden weakness. ?  A fast or irregular heartbeat. After you call 911, the  may tell you to chew 1 adult-strength or 2 to 4 low-dose aspirin. Wait for an ambulance. Do not try to drive yourself.     · You have symptoms of a stroke. These may include:  ? Sudden numbness, tingling, weakness, or loss of movement in your face, arm, or leg, especially on only one side of your body. ? Sudden vision changes. ? Sudden trouble speaking. ? Sudden confusion or trouble understanding simple statements. ? Sudden problems with walking or balance. ? A sudden, severe headache that is different from past headaches.    Call your doctor now or seek immediate medical care if:    · You have heart palpitations and:  ? Are dizzy or lightheaded, or you feel like you may faint. ? Have new or increased shortness of breath.    Watch closely for changes in your health, and be sure to contact your doctor if:    · You continue to have heart palpitations. Where can you learn more? Go to http://laina-taylor.info/. Enter R508 in the search box to learn more about \"Palpitations: Care Instructions. \"  Current as of: April 9, 2019  Content Version: 12.2  © 2064-1642 [employer]. Care instructions adapted under license by [employer] (which disclaims liability or warranty for this information). If you have questions about a medical condition or this instruction, always ask your healthcare professional. [place] [address] any warranty or liability for your use of this information.

## 2019-10-25 ENCOUNTER — OFFICE VISIT (OUTPATIENT)
Dept: CARDIOLOGY CLINIC | Age: 44
End: 2019-10-25

## 2019-10-25 VITALS
HEIGHT: 70 IN | DIASTOLIC BLOOD PRESSURE: 100 MMHG | BODY MASS INDEX: 39.22 KG/M2 | HEART RATE: 109 BPM | WEIGHT: 274 LBS | OXYGEN SATURATION: 96 % | SYSTOLIC BLOOD PRESSURE: 132 MMHG

## 2019-10-25 DIAGNOSIS — R00.2 PALPITATION: Primary | ICD-10-CM

## 2019-10-25 DIAGNOSIS — R07.9 CHEST PAIN, UNSPECIFIED TYPE: ICD-10-CM

## 2019-10-25 NOTE — PATIENT INSTRUCTIONS
If you have not heard from the central scheduler to schedule your testing in 48 hours, please call [phone].

## 2019-10-25 NOTE — PROGRESS NOTES
Isamar Haskins    CP, Palps    HPI    Isamar Haskins is a 40 y.o. AAM with no known coronary disease here for evaluation of chest pain and worsening palpitations. As you know patient was recently formally diagnosed with sleep apnea and has been newly wearing his CPAP for about a month now. Despite this he says he has been experiencing fluttering sensations in his heart almost every day. He does have a smart watch and he is seen his heart rate monitor go into the 120s when he is just sitting at rest-then quickly go back down into the 90s. He can feel kind of winded in the moment when he gets several palpitations in a row and then he feels a tightness or discomfort in his chest.  This is a happening almost every day for the last month but he did not have it for the last 3 days until coming to my office right now. Actually has a chest discomfort and sensation of the palpitations during my examination. History and ER records reviewed with patient. CV RFs: Remote h/o polysubstance abuse (cocaine + IVDA/ heroin)    Past Medical History:   Diagnosis Date    Anxiety 10/11/2016    Continuous nicotine dependence 10/11/2016    Depression     Headache     Hep C w/o coma, chronic ([facility] [address] 10/11/2016    Hepatitis C     Insomnia 10/11/2016    Obesity, Class II, BMI 35-39.9 10/11/2016    Paranoid schizophrenia ([facility] [address]     Psychotic disorder ([facility] [address]     Schizophrenia ([facility] [address] 10/11/2016       Past Surgical History:   Procedure Laterality Date    COLONOSCOPY N/A 3/30/2017    COLONOSCOPY performed by Abraham Elizondo MD at [facility]OSCOPY    HX APPENDECTOMY  2000       Current Outpatient Medications   Medication Sig Dispense Refill    nicotine (NICODERM CQ) 14 mg/24 hr patch 1 patch by transdermal route every 24 hours on weeks 3 and 4 14 Patch 0    methadone (DOLOPHINE) 10 mg tablet Take 70 mg by mouth daily.  Indications: 65mg         Allergies   Allergen Reactions    Pcn [Penicillins] Hives       Social History Socioeconomic History    Marital status: LEGALLY      Spouse name: Not on file    Number of children: Not on file    Years of education: Not on file    Highest education level: Not on file   Occupational History    Not on file   Social Needs    Financial resource strain: Not on file    Food insecurity:     Worry: Not on file     Inability: Not on file    Transportation needs:     Medical: Not on file     Non-medical: Not on file   Tobacco Use    Smoking status: Current Every Day Smoker     Packs/day: 0.50     Years: 4.00     Pack years: 2.00    Smokeless tobacco: Never Used   Substance and Sexual Activity    Alcohol use: Yes     Comment: 6 pack/week    Drug use: No     Types: Heroin     Comment: [facility]    Sexual activity: Yes     Partners: Female   Lifestyle    Physical activity:     Days per week: Not on file     Minutes per session: Not on file    Stress: Not on file   Relationships    Social connections:     Talks on phone: Not on file     Gets together: Not on file     Attends [employer] service: Not on file     Active member of club or organization: Not on file     Attends meetings of clubs or organizations: Not on file     Relationship status: Not on file    Intimate partner violence:     Fear of current or ex partner: Not on file     Emotionally abused: Not on file     Physically abused: Not on file     Forced sexual activity: Not on file   Other Topics Concern    Not on file   Social History Narrative    Not on file        The patient has a family history of    Review of Systems    14 pt Review of Systems is negative unless otherwise mentioned in the HPI.     Wt Readings from Last 3 Encounters:   10/25/19 124.3 kg (274 lb)   10/07/19 122.5 kg (270 lb)   08/07/19 123.4 kg (272 lb)     Temp Readings from Last 3 Encounters:   10/07/19 99.1 °F (37.3 °C)   08/07/19 97.8 °F (36.6 °C) (Oral)   07/03/19 98.8 °F (37.1 °C) (Oral)     BP Readings from Last 3 Encounters:   10/25/19 (!) 132/100   10/07/19 123/83   08/07/19 126/74     Pulse Readings from Last 3 Encounters:   10/25/19 (!) 109   10/07/19 99   08/07/19 (!) 102       Physical Exam:    Visit Vitals  BP (!) 132/100   Pulse (!) 109   Ht 5' 10\" (1.778 m)   Wt 124.3 kg (274 lb)   SpO2 96%   BMI 39.31 kg/m²      Physical Exam   Constitutional: He is oriented to person, place, and time. HENT:   Head: Normocephalic and atraumatic. Eyes: Pupils are equal, round, and reactive to light. EOM are normal. No scleral icterus. Cardiovascular: Regular rhythm, normal heart sounds and intact distal pulses. Exam reveals no gallop and no friction rub. No murmur heard. Tachy S1, S2, slight irregularity noted- PAC? Pulmonary/Chest: Effort normal and breath sounds normal. No respiratory distress. He has no wheezes. He has no rales. He exhibits no tenderness. Abdominal: Soft. Bowel sounds are normal.   Musculoskeletal: He exhibits no edema. Neurological: He is alert and oriented to person, place, and time. Skin: Skin is warm and dry. No rash noted. Psychiatric: He has a normal mood and affect. EKG today shows: Sinus tachycardia, normal axis and intervals, no ST segment abnormalities    Lab Results   Component Value Date/Time    TSH 1.36 10/07/2019 09:25 AM     Lab Results   Component Value Date/Time    Sodium 141 10/07/2019 09:25 AM    Potassium 3.9 10/07/2019 09:25 AM    Chloride 103 10/07/2019 09:25 AM    CO2 30 10/07/2019 09:25 AM    Anion gap 8 10/07/2019 09:25 AM    Glucose 148 (H) 10/07/2019 09:25 AM    BUN 10 10/07/2019 09:25 AM    Creatinine 1.03 10/07/2019 09:25 AM    BUN/Creatinine ratio 10 (L) 10/07/2019 09:25 AM    GFR est AA >60 10/07/2019 09:25 AM    GFR est non-AA >60 10/07/2019 09:25 AM    Calcium 8.9 10/07/2019 09:25 AM    Bilirubin, total <0.2 04/11/2019 08:31 AM    AST (SGOT) 21 04/11/2019 08:31 AM    Alk.  phosphatase 74 04/11/2019 08:31 AM    Protein, total 7.1 04/11/2019 08:31 AM Albumin 3.9 04/11/2019 08:31 AM    Globulin 4.0 06/13/2013 10:52 AM    A-G Ratio 1.2 04/11/2019 08:31 AM    ALT (SGPT) 21 04/11/2019 08:31 AM     Magnesium   Date Value Ref Range Status   10/07/2019 1.9 1.6 - 2.6 mg/dL Final       Impression and Plan:  Barby Wen is a 40 y. o. with:    1.) Almost daily palpitations  2.) Atypical CP at rest/ associated with palpitations  3.) VINCENT, recently on CPAP  4.) H/o polysubstance abuse    1.) Stress echocardiogram  2.) If negative, can consider low dose BB for likely benign ectopy  3.) RTC after testing ~8 weeks    All questions answered, further recs to follow. Thank you for allowing me to participate in the care of your patient, please do not hesitate to call with questions or concerns. Follow-up and Dispositions    · Return in about 8 weeks (around 12/20/2019).          Mayra Abrams, DO

## 2019-10-25 NOTE — PROGRESS NOTES
Angi Leigh presents today for No chief complaint on file. Angi Leigh preferred language for health care discussion is english/other. Is someone accompanying this pt? no    Is the patient using any DME equipment during [address]? No     Depression Screening:  3 most recent PHQ Screens 4/4/2019   PHQ Not Done -   Little interest or pleasure in doing things Not at all   Feeling down, depressed, irritable, or hopeless Not at all   Total Score PHQ 2 0       Learning Assessment:  Learning Assessment 10/11/2016   PRIMARY LEARNER Patient   PRIMARY LANGUAGE ENGLISH   LEARNER PREFERENCE PRIMARY VIDEOS   ANSWERED BY patient   RELATIONSHIP SELF       Abuse Screening:  Abuse Screening Questionnaire 4/4/2019   Do you ever feel afraid of your partner? N   Are you in a relationship with someone who physically or mentally threatens you? N   Is it safe for you to go home? Y       Fall Risk  No flowsheet data found. Pt currently taking Anticoagulant therapy? no    Coordination of Care:  1. Have you been to the ER, urgent care clinic since your last visit? Hospitalized since your last visit? no    2. Have you seen or consulted any other health care providers outside of the [address] [place] [address] since your last visit? Include any pap smears or colon screening.  no

## 2019-11-07 ENCOUNTER — HOSPITAL ENCOUNTER (OUTPATIENT)
Dept: NON INVASIVE DIAGNOSTICS | Age: 44
Discharge: HOME | End: 2019-11-07
Attending: INTERNAL MEDICINE
Payer: MEDICAID

## 2019-11-07 VITALS
SYSTOLIC BLOOD PRESSURE: 142 MMHG | WEIGHT: 272 LBS | DIASTOLIC BLOOD PRESSURE: 80 MMHG | BODY MASS INDEX: 38.94 KG/M2 | HEIGHT: 70 IN

## 2019-11-07 DIAGNOSIS — R00.2 PALPITATION: ICD-10-CM

## 2019-11-07 DIAGNOSIS — R07.9 CHEST PAIN, UNSPECIFIED TYPE: ICD-10-CM

## 2019-11-07 LAB
STRESS ANGINA INDEX: 0
STRESS BASELINE DIAS BP: 80 MMHG
STRESS BASELINE HR: 94 BPM
STRESS BASELINE SYS BP: 142 MMHG
STRESS ESTIMATED WORKLOAD: 7 METS
STRESS EXERCISE DUR MIN: NORMAL
STRESS PEAK DIAS BP: 90 MMHG
STRESS PEAK SYS BP: 156 MMHG
STRESS PERCENT HR ACHIEVED: 88 %
STRESS POST PEAK HR: 155 BPM
STRESS RATE PRESSURE PRODUCT: NORMAL BPM*MMHG
STRESS SR DUKE TREADMILL SCORE: 0
STRESS ST DEPRESSION: 0 MM
STRESS ST ELEVATION: 0 MM
STRESS TARGET HR: 176 BPM

## 2019-11-07 PROCEDURE — 93351 STRESS TTE COMPLETE: CPT

## 2019-11-08 NOTE — PROGRESS NOTES
Per last note'  mpression and Plan:  John Askew is a 40 y. o. with:     1.) Almost daily palpitations  2.) Atypical CP at rest/ associated with palpitations  3.) VINCENT, recently on CPAP  4.) H/o polysubstance abuse     1.) Stress echocardiogram  2.) If negative, can consider low dose BB for likely benign ectopy  3.) RTC after testing ~8 weeks     All questions answered, further recs to follow.     Thank you for allowing me to participate in the care of your patient, please do not hesitate to call with questions or concerns.

## 2019-11-15 ENCOUNTER — TELEPHONE (OUTPATIENT)
Dept: PULMONOLOGY | Age: 44
End: 2019-11-15

## 2019-11-19 ENCOUNTER — TELEPHONE (OUTPATIENT)
Dept: CARDIOLOGY CLINIC | Age: 44
End: 2019-11-19

## 2019-11-19 NOTE — TELEPHONE ENCOUNTER
----- Message from Sary Byers DO sent at 11/8/2019  1:05 PM EST -----  Please let him know stress test was normal/ low risk which is reassuring  Can discuss further and reeval palpitations at his follow up visit    Thanks    ----- Message -----  From: Chang JULIA Askew  Sent: 91/3/1147  12:25 PM EST  To: Sary Byers DO    Per last note'  mpression and Plan:  John Askew is a 40 y. o. with:     1.) Almost daily palpitations  2.) Atypical CP at rest/ associated with palpitations  3.) VINCENT, recently on CPAP  4.) H/o polysubstance abuse     1.) Stress echocardiogram  2.) If negative, can consider low dose BB for likely benign ectopy  3.) RTC after testing ~8 weeks     All questions answered, further recs to follow.     Thank you for allowing me to participate in the care of your patient, please do not hesitate to call with questions or concerns.

## 2019-11-21 NOTE — TELEPHONE ENCOUNTER
Called patient, verified by two identifiers, name and . Patient notified of pharm nuc results. All questions answered at time of phone call.

## 2019-12-18 ENCOUNTER — OFFICE VISIT (OUTPATIENT)
Dept: FAMILY MEDICINE CLINIC | Facility: CLINIC | Age: 44
End: 2019-12-18

## 2019-12-18 VITALS
TEMPERATURE: 98.4 F | HEART RATE: 64 BPM | RESPIRATION RATE: 16 BRPM | OXYGEN SATURATION: 96 % | SYSTOLIC BLOOD PRESSURE: 120 MMHG | BODY MASS INDEX: 40.52 KG/M2 | WEIGHT: 283 LBS | HEIGHT: 70 IN | DIASTOLIC BLOOD PRESSURE: 88 MMHG

## 2019-12-18 DIAGNOSIS — E66.01 SEVERE OBESITY (HCC): ICD-10-CM

## 2019-12-18 DIAGNOSIS — Z91.89 AT RISK OF DIABETES MELLITUS: ICD-10-CM

## 2019-12-18 DIAGNOSIS — F17.200 CONTINUOUS NICOTINE DEPENDENCE: Primary | ICD-10-CM

## 2019-12-18 LAB — HBA1C MFR BLD HPLC: 6 %

## 2019-12-18 RX ORDER — VARENICLINE TARTRATE 25 MG
KIT ORAL
Qty: 1 DOSE PACK | Refills: 0 | Status: SHIPPED | OUTPATIENT
Start: 2019-12-18 | End: 2020-02-10

## 2019-12-18 NOTE — PROGRESS NOTES
Internal Medicine Progress Note    Today's Date:  2019   Patient:  Anita Artis  Patient :  1975    Subjective:     Chief Complaint   Patient presents with    Diabetes     did not check BS    Weight Management    Nicotine Dependence      Schizophrenia  This is a chronic problem. This is stable. Pt takes no medication for this. Pt is not seeing a psychiatrist. +insomnia.      Obesity Class III  This is a chronic problem. This is not at goal. Pt tries to eat a healthy diet. Pt gained weight since the last visit.     Smoker   This is a chronic problem. This is not at goal. Pt smokes 1/2 pack per day. Pt is open to quitting. 3 most recent PHQ Screens 2019   PHQ Not Done -   Little interest or pleasure in doing things Not at all   Feeling down, depressed, irritable, or hopeless Not at all   Total Score PHQ 2 0      Past Medical History:   Diagnosis Date    Anxiety 10/11/2016    At risk of diabetes mellitus 2019    Continuous nicotine dependence 10/11/2016    Depression     Headache     Hep C w/o coma, chronic ([facility] 10/11/2016    Hepatitis C     Insomnia 10/11/2016    Obesity, Class II, BMI 35-39.9 10/11/2016    Paranoid schizophrenia ([facility]     Psychotic disorder ([facility]     Schizophrenia ([facility] 10/11/2016     Past Surgical History:   Procedure Laterality Date    COLONOSCOPY N/A 3/30/2017    COLONOSCOPY performed by Génesis Main MD at [facility] HX APPENDECTOMY        reports that he has been smoking. He has a 2.00 pack-year smoking history. He has never used smokeless tobacco. He reports current alcohol use. He reports that he does not use drugs.   Family History   Problem Relation Age of Onset    Cancer Mother 36        colon    Cancer Maternal Aunt         breast    Diabetes Father      Allergies   Allergen Reactions    Pcn [Penicillins] Hives     Review of Systems   CV:      chest pain, palpitations  PULM:  SOB, wheezing, cough, sputum production    Current Outpatient Meds      Current Outpatient Medications on File Prior to Visit   Medication Sig Dispense Refill    methadone (DOLOPHINE) 10 mg tablet Take 70 mg by mouth daily. Indications: 65mg      [DISCONTINUED] nicotine (NICODERM CQ) 14 mg/24 hr patch 1 patch by transdermal route every 24 hours on weeks 3 and 4 14 Patch 0     No current facility-administered medications on file prior to visit. Objective:       Visit Vitals  /88 (BP 1 Location: Left arm, BP Patient Position: Sitting)   Pulse 64   Temp 98.4 °F (36.9 °C) (Oral)   Resp 16   Ht 5' 10\" (1.778 m)   Wt 283 lb (128.4 kg)   SpO2 96%   BMI 40.61 kg/m²     General:   Well-nourished, well-groomed, pleasant, alert, in no acute distress  Head:  Normocephalic, atraumatic  Ears:  External ears WNL  Nose:  External nares WNL  Psych:  No pressured speech, no abnormal thought content    Lab Results   Component Value Date/Time    Hemoglobin A1c 6.6 (H) 04/11/2019 08:31 AM    Glucose 148 (H) 10/07/2019 09:25 AM    LDL, calculated 101 (H) 04/11/2019 08:31 AM    Creatinine 1.03 10/07/2019 09:25 AM       Assessment/Plan & Orders:         ICD-10-CM ICD-9-CM    1. Continuous nicotine dependence F17.200 305.1 varenicline (CHANTIX STARTER LACEY) 0.5 mg (11)- 1 mg (42) DsPk   2. At risk of diabetes mellitus Z91.89 V49.89 AMB POC HEMOGLOBIN A1C   3. Severe obesity ([facility] E66.01 278.01        Information given on ketogenic/IF diet with exercise again  Recommend smoking cessation    Follow-up and Dispositions    · Return for Smoking/Smokeless tobacco/Vaping cessation, Weight management, Prediabetes. *Patient verbalized understanding and agreement with the plan. Patient was given an after-visit summary. Kathryn Staples  [id number] MD - Internal Medicine  12/18/2019, 1:59 PM  [facility]  [address] [place], [address]  Phone [phone]  Fax [phone]

## 2019-12-18 NOTE — PROGRESS NOTES
Chyna Giraldo is a 40 y.o.  male presents today for office visit for follow up. Pt would also like to discuss DM. Pt is not fasting. Pt is in Room # 2      1. Have you been to the ER, urgent care clinic since your last visit? Hospitalized since your last visit? No    2. Have you seen or consulted any other health care providers outside of the [address] [place] [address] since your last visit? Include any pap smears or colon screening. No    Upcoming Appts  none    Health Maintenance reviewed       VORB: No orders of the defined types were placed in this encounter.   Chacorta Stallworth, Sam Michel, JULIA

## 2019-12-19 ENCOUNTER — OFFICE VISIT (OUTPATIENT)
Dept: CARDIOLOGY CLINIC | Age: 44
End: 2019-12-19

## 2019-12-19 VITALS
OXYGEN SATURATION: 96 % | SYSTOLIC BLOOD PRESSURE: 140 MMHG | DIASTOLIC BLOOD PRESSURE: 92 MMHG | WEIGHT: 283 LBS | HEART RATE: 116 BPM | HEIGHT: 70 IN | BODY MASS INDEX: 40.52 KG/M2

## 2019-12-19 DIAGNOSIS — R00.0 TACHYCARDIA: ICD-10-CM

## 2019-12-19 DIAGNOSIS — R00.2 PALPITATION: Primary | ICD-10-CM

## 2019-12-19 RX ORDER — METOPROLOL SUCCINATE 25 MG/1
25 TABLET, EXTENDED RELEASE ORAL DAILY
Qty: 30 TAB | Refills: 5 | Status: SHIPPED | OUTPATIENT
Start: 2019-12-19 | End: 2020-02-10

## 2019-12-19 NOTE — PROGRESS NOTES
Freda Arceo presents today for   Chief Complaint   Patient presents with    Chest Pain (Angina)    Palpitations       Freda Arceo preferred language for health care discussion is english/other. Is someone accompanying this pt? no    Is the patient using any DME equipment during [address]? no    Depression Screening:  3 most recent PHQ Screens 4/4/2019   PHQ Not Done -   Little interest or pleasure in doing things Not at all   Feeling down, depressed, irritable, or hopeless Not at all   Total Score PHQ 2 0       Learning Assessment:  Learning Assessment 10/11/2016   PRIMARY LEARNER Patient   PRIMARY LANGUAGE ENGLISH   LEARNER PREFERENCE PRIMARY VIDEOS   ANSWERED BY patient   RELATIONSHIP SELF       Abuse Screening:  Abuse Screening Questionnaire 4/4/2019   Do you ever feel afraid of your partner? N   Are you in a relationship with someone who physically or mentally threatens you? N   Is it safe for you to go home? Y       Fall Risk  No flowsheet data found. Pt currently taking Anticoagulant therapy? no    Coordination of Care:  1. Have you been to the ER, urgent care clinic since your last visit? Hospitalized since your last visit? no    2. Have you seen or consulted any other health care providers outside of the [address] [place] [address] since your last visit? Include any pap smears or colon screening.  no

## 2019-12-19 NOTE — PROGRESS NOTES
Hedwig Bumpers    CP, Palps    HPI    Hedwig Bumpers is a 40 y.o. AAM with no known coronary disease here for evaluation of chest pain and worsening palpitations. As you know patient was recently formally diagnosed with sleep apnea and has been newly wearing his CPAP for about a month now. Despite this he says he has been experiencing fluttering sensations in his heart almost every day. He does have a smart watch and he is seen his heart rate monitor go into the 120s when he is just sitting at rest-then quickly go back down into the 90s. He can feel kind of winded in the moment when he gets several palpitations in a row and then he feels a tightness or discomfort in his chest.  This is a happening almost every day for the last month but he did not have it for the last 3 days until coming to my office right now. Actually has a chest discomfort and sensation of the palpitations during my examination. History and ER records reviewed with patient. CV RFs: Remote h/o polysubstance abuse (cocaine + IVDA/ heroin)    Past Medical History:   Diagnosis Date    Anxiety 10/11/2016    At risk of diabetes mellitus 12/18/2019    Continuous nicotine dependence 10/11/2016    Depression     Headache     Hep C w/o coma, chronic ([facility] 10/11/2016    Hepatitis C     Insomnia 10/11/2016    Obesity, Class II, BMI 35-39.9 10/11/2016    Paranoid schizophrenia ([facility]     Psychotic disorder ([facility]     Schizophrenia ([facility] 10/11/2016       Past Surgical History:   Procedure Laterality Date    COLONOSCOPY N/A 3/30/2017    COLONOSCOPY performed by Nicolle Colorado MD at [facility] ENDOSCOPY    HX APPENDECTOMY  2000       Current Outpatient Medications   Medication Sig Dispense Refill    varenicline (CHANTIX STARTER LACEY) 0.5 mg (11)- 1 mg (42) DsPk Use as directed 1 Dose Pack 0    methadone (DOLOPHINE) 10 mg tablet Take 70 mg by mouth daily.  Indications: 65mg         Allergies   Allergen Reactions    Pcn [Penicillins] Hives Social History     Socioeconomic History    Marital status: LEGALLY      Spouse name: Not on file    Number of children: Not on file    Years of education: Not on file    Highest education level: Not on file   Occupational History    Not on file   Social Needs    Financial resource strain: Not on file    Food insecurity:     Worry: Not on file     Inability: Not on file    Transportation needs:     Medical: Not on file     Non-medical: Not on file   Tobacco Use    Smoking status: Light Tobacco Smoker     Packs/day: 0.50     Years: 4.00     Pack years: 2.00    Smokeless tobacco: Never Used   Substance and Sexual Activity    Alcohol use: Yes     Comment: 6 pack/week    Drug use: No     Types: Heroin     Comment: [facility]    Sexual activity: Yes     Partners: Female   Lifestyle    Physical activity:     Days per week: Not on file     Minutes per session: Not on file    Stress: Not on file   Relationships    Social connections:     Talks on phone: Not on file     Gets together: Not on file     Attends Anabaptist service: Not on file     Active member of club or organization: Not on file     Attends meetings of clubs or organizations: Not on file     Relationship status: Not on file    Intimate partner violence:     Fear of current or ex partner: Not on file     Emotionally abused: Not on file     Physically abused: Not on file     Forced sexual activity: Not on file   Other Topics Concern    Not on file   Social History Narrative    Not on file        The patient has a family history of    Review of Systems    14 pt Review of Systems is negative unless otherwise mentioned in the HPI.     Wt Readings from Last 3 Encounters:   12/19/19 128.4 kg (283 lb)   12/18/19 128.4 kg (283 lb)   11/07/19 123.4 kg (272 lb)     Temp Readings from Last 3 Encounters:   12/18/19 98.4 °F (36.9 °C) (Oral)   10/07/19 99.1 °F (37.3 °C)   08/07/19 97.8 °F (36.6 °C) (Oral)     BP Readings from Last 3 Encounters:   12/18/19 120/88   11/07/19 142/80   10/25/19 (!) 132/100     Pulse Readings from Last 3 Encounters:   12/18/19 64   10/25/19 (!) 109   10/07/19 99       Physical Exam:    Visit Vitals  Ht 5' 10\" (1.778 m)   Wt 128.4 kg (283 lb)   BMI 40.61 kg/m²      Physical Exam  HENT:      Head: Normocephalic and atraumatic. Eyes:      General: No scleral icterus. Pupils: Pupils are equal, round, and reactive to light. Cardiovascular:      Rate and Rhythm: Regular rhythm. Heart sounds: Normal heart sounds. No murmur. No friction rub. No gallop. Comments: Tachy S1, S2, slight irregularity noted- PAC? Pulmonary:      Effort: Pulmonary effort is normal. No respiratory distress. Breath sounds: Normal breath sounds. No wheezing or rales. Chest:      Chest wall: No tenderness. Abdominal:      General: Bowel sounds are normal.      Palpations: Abdomen is soft. Skin:     General: Skin is warm and dry. Findings: No rash. Neurological:      Mental Status: He is alert and oriented to person, place, and time. EKG today shows: Sinus tachycardia, normal axis and intervals, no ST segment abnormalities    Lab Results   Component Value Date/Time    TSH 1.36 10/07/2019 09:25 AM     Lab Results   Component Value Date/Time    Sodium 141 10/07/2019 09:25 AM    Potassium 3.9 10/07/2019 09:25 AM    Chloride 103 10/07/2019 09:25 AM    CO2 30 10/07/2019 09:25 AM    Anion gap 8 10/07/2019 09:25 AM    Glucose 148 (H) 10/07/2019 09:25 AM    BUN 10 10/07/2019 09:25 AM    Creatinine 1.03 10/07/2019 09:25 AM    BUN/Creatinine ratio 10 (L) 10/07/2019 09:25 AM    GFR est AA >60 10/07/2019 09:25 AM    GFR est non-AA >60 10/07/2019 09:25 AM    Calcium 8.9 10/07/2019 09:25 AM    Bilirubin, total <0.2 04/11/2019 08:31 AM    AST (SGOT) 21 04/11/2019 08:31 AM    Alk.  phosphatase 74 04/11/2019 08:31 AM    Protein, total 7.1 04/11/2019 08:31 AM    Albumin 3.9 04/11/2019 08:31 AM    Globulin 4.0 06/13/2013 10:52 AM    A-G Ratio 1.2 04/11/2019 08:31 AM    ALT (SGPT) 21 04/11/2019 08:31 AM     Magnesium   Date Value Ref Range Status   10/07/2019 1.9 1.6 - 2.6 mg/dL Final        Stress Echo: 11/7/19  Baseline Echocardiographic Data Normal baseline left ventricular cavity size and wall motion. Low normal left ventricular systolic function. EF estimated at 50% No pericardial effusion. Stress Echocardiographic Data Left ventricular cavity size decreased from baseline. Left ventricular systolic function improved from baseline. Normal wall motion, unchanged from baseline. Study Impression Image quality is adequate. Normal stress echocardiogram. Low risk study. Impression and Plan:  Israel Duran is a 40 y. o. with:    1.) Tachycardia, almost daily palpitations, anxiety induced +/- IST  2.) Atypical CP at rest/ associated with palpitations  3.) VINCENT, on CPAP  4.) H/o polysubstance abuse  5.) Negative stress echo results- dw pt today    1.) Start Metoprolol 25 SR take qhs  2.) RTC 3 months recheck, if side effects, please call me back sooner    All questions answered  Encouraged continued CPAP use    Thank you for allowing me to participate in the care of your patient, please do not hesitate to call with questions or concerns.     [address],    Cesar Greer, DO

## 2020-02-10 ENCOUNTER — HOSPITAL ENCOUNTER (EMERGENCY)
Age: 45
Discharge: HOME | End: 2020-02-10
Attending: EMERGENCY MEDICINE
Payer: MEDICAID

## 2020-02-10 ENCOUNTER — APPOINTMENT (OUTPATIENT)
Dept: GENERAL RADIOLOGY | Age: 45
End: 2020-02-10
Attending: PHYSICIAN ASSISTANT
Payer: MEDICAID

## 2020-02-10 VITALS
DIASTOLIC BLOOD PRESSURE: 90 MMHG | RESPIRATION RATE: 17 BRPM | TEMPERATURE: 99.2 F | WEIGHT: 283 LBS | HEIGHT: 70 IN | HEART RATE: 104 BPM | OXYGEN SATURATION: 97 % | BODY MASS INDEX: 40.52 KG/M2 | SYSTOLIC BLOOD PRESSURE: 148 MMHG

## 2020-02-10 DIAGNOSIS — R05.9 COUGH: ICD-10-CM

## 2020-02-10 DIAGNOSIS — R03.0 ELEVATED BLOOD PRESSURE READING: ICD-10-CM

## 2020-02-10 DIAGNOSIS — J18.9 COMMUNITY ACQUIRED PNEUMONIA OF LEFT LUNG, UNSPECIFIED PART OF LUNG: Primary | ICD-10-CM

## 2020-02-10 LAB
ALBUMIN SERPL-MCNC: 3.4 G/DL (ref 3.4–5)
ALBUMIN/GLOB SERPL: 0.8 {RATIO} (ref 0.8–1.7)
ALP SERPL-CCNC: 93 U/L (ref 45–117)
ALT SERPL-CCNC: 49 U/L (ref 16–61)
ANION GAP SERPL CALC-SCNC: 9 MMOL/L (ref 3–18)
AST SERPL-CCNC: 48 U/L (ref 10–38)
BASOPHILS # BLD: 0 K/UL (ref 0–0.1)
BASOPHILS NFR BLD: 0 % (ref 0–2)
BILIRUB SERPL-MCNC: 0.3 MG/DL (ref 0.2–1)
BUN SERPL-MCNC: 11 MG/DL (ref 7–18)
BUN/CREAT SERPL: 9 (ref 12–20)
CALCIUM SERPL-MCNC: 8.8 MG/DL (ref 8.5–10.1)
CHLORIDE SERPL-SCNC: 102 MMOL/L (ref 100–111)
CK MB CFR SERPL CALC: 1.7 % (ref 0–4)
CK MB SERPL-MCNC: 4.2 NG/ML (ref 5–25)
CK SERPL-CCNC: 253 U/L (ref 39–308)
CO2 SERPL-SCNC: 30 MMOL/L (ref 21–32)
CREAT SERPL-MCNC: 1.16 MG/DL (ref 0.6–1.3)
DIFFERENTIAL METHOD BLD: ABNORMAL
EOSINOPHIL # BLD: 0.1 K/UL (ref 0–0.4)
EOSINOPHIL NFR BLD: 1 % (ref 0–5)
ERYTHROCYTE [DISTWIDTH] IN BLOOD BY AUTOMATED COUNT: 15.3 % (ref 11.6–14.5)
GLOBULIN SER CALC-MCNC: 4.2 G/DL (ref 2–4)
GLUCOSE SERPL-MCNC: 147 MG/DL (ref 74–99)
HCT VFR BLD AUTO: 40.2 % (ref 36–48)
HGB BLD-MCNC: 12.7 G/DL (ref 13–16)
LYMPHOCYTES # BLD: 2.2 K/UL (ref 0.9–3.6)
LYMPHOCYTES NFR BLD: 27 % (ref 21–52)
MCH RBC QN AUTO: 25.2 PG (ref 24–34)
MCHC RBC AUTO-ENTMCNC: 31.6 G/DL (ref 31–37)
MCV RBC AUTO: 79.8 FL (ref 74–97)
MONOCYTES # BLD: 0.7 K/UL (ref 0.05–1.2)
MONOCYTES NFR BLD: 8 % (ref 3–10)
NEUTS SEG # BLD: 5.2 K/UL (ref 1.8–8)
NEUTS SEG NFR BLD: 64 % (ref 40–73)
PLATELET # BLD AUTO: 256 K/UL (ref 135–420)
PMV BLD AUTO: 9.2 FL (ref 9.2–11.8)
POTASSIUM SERPL-SCNC: 4.1 MMOL/L (ref 3.5–5.5)
PROT SERPL-MCNC: 7.6 G/DL (ref 6.4–8.2)
RBC # BLD AUTO: 5.04 M/UL (ref 4.7–5.5)
SODIUM SERPL-SCNC: 141 MMOL/L (ref 136–145)
TROPONIN I SERPL-MCNC: <0.02 NG/ML (ref 0–0.04)
WBC # BLD AUTO: 8.2 K/UL (ref 4.6–13.2)

## 2020-02-10 PROCEDURE — 74011250637 HC RX REV CODE- 250/637: Performed by: EMERGENCY MEDICINE

## 2020-02-10 PROCEDURE — 93005 ELECTROCARDIOGRAM TRACING: CPT

## 2020-02-10 PROCEDURE — 85025 COMPLETE CBC W/AUTO DIFF WBC: CPT

## 2020-02-10 PROCEDURE — 99285 EMERGENCY DEPT VISIT HI MDM: CPT

## 2020-02-10 PROCEDURE — 80053 COMPREHEN METABOLIC PANEL: CPT

## 2020-02-10 PROCEDURE — 82550 ASSAY OF CK (CPK): CPT

## 2020-02-10 PROCEDURE — 71046 X-RAY EXAM CHEST 2 VIEWS: CPT

## 2020-02-10 RX ORDER — DOXYCYCLINE 100 MG/1
100 CAPSULE ORAL 2 TIMES DAILY
Qty: 20 CAP | Refills: 0 | Status: SHIPPED | OUTPATIENT
Start: 2020-02-10 | End: 2020-02-20

## 2020-02-10 RX ORDER — QUETIAPINE FUMARATE 50 MG/1
50 TABLET, FILM COATED ORAL
COMMUNITY

## 2020-02-10 RX ORDER — METHADONE HYDROCHLORIDE 10 MG/ML
40 CONCENTRATE ORAL DAILY
COMMUNITY
End: 2022-08-23 | Stop reason: ALTCHOICE

## 2020-02-10 RX ORDER — IBUPROFEN 600 MG/1
600 TABLET ORAL
Qty: 20 TAB | Refills: 0 | Status: SHIPPED | OUTPATIENT
Start: 2020-02-10 | End: 2020-08-19

## 2020-02-10 RX ORDER — DOXYCYCLINE 100 MG/1
100 CAPSULE ORAL
Status: COMPLETED | OUTPATIENT
Start: 2020-02-10 | End: 2020-02-10

## 2020-02-10 RX ORDER — HYDROCHLOROTHIAZIDE 25 MG/1
25 TABLET ORAL DAILY
Qty: 14 TAB | Refills: 0 | Status: SHIPPED | OUTPATIENT
Start: 2020-02-10 | End: 2020-02-24

## 2020-02-10 RX ORDER — HYDROCHLOROTHIAZIDE 25 MG/1
25 TABLET ORAL DAILY
Status: DISCONTINUED | OUTPATIENT
Start: 2020-02-11 | End: 2020-02-10 | Stop reason: HOSPADM

## 2020-02-10 RX ORDER — HYDROCHLOROTHIAZIDE 25 MG/1
25 TABLET ORAL
Status: COMPLETED | OUTPATIENT
Start: 2020-02-10 | End: 2020-02-10

## 2020-02-10 RX ORDER — IBUPROFEN 600 MG/1
600 TABLET ORAL
Status: COMPLETED | OUTPATIENT
Start: 2020-02-10 | End: 2020-02-10

## 2020-02-10 RX ADMIN — IBUPROFEN 600 MG: 600 TABLET ORAL at 17:36

## 2020-02-10 RX ADMIN — HYDROCHLOROTHIAZIDE 25 MG: 25 TABLET ORAL at 17:36

## 2020-02-10 RX ADMIN — DOXYCYCLINE HYCLATE 100 MG: 100 CAPSULE ORAL at 17:36

## 2020-02-10 NOTE — DISCHARGE INSTRUCTIONS
Patient Education        Cough: Care Instructions  Your Care Instructions    A cough is your body's response to something that bothers your throat or airways. Many things can cause a cough. You might cough because of a cold or the flu, bronchitis, or asthma. Smoking, postnasal drip, allergies, and stomach acid that backs up into your throat also can cause coughs. A cough is a symptom, not a disease. Most coughs stop when the cause, such as a cold, goes away. You can take a few steps at home to cough less and feel better. Follow-up care is a key part of your treatment and safety. Be sure to make and go to all appointments, and call your doctor if you are having problems. It's also a good idea to know your test results and keep a list of the medicines you take. How can you care for yourself at home? · Drink lots of water and other fluids. This helps thin the mucus and soothes a dry or sore throat. Honey or lemon juice in hot water or tea may ease a dry cough. · Take cough medicine as directed by your doctor. · Prop up your head on pillows to help you breathe and ease a dry cough. · Try cough drops to soothe a dry or sore throat. Cough drops don't stop a cough. Medicine-flavored cough drops are no better than candy-flavored drops or hard candy. · Do not smoke. Avoid secondhand smoke. If you need help quitting, talk to your doctor about stop-smoking programs and medicines. These can increase your chances of quitting for good. When should you call for help? Call 911 anytime you think you may need emergency care.  For example, call if:    · You have severe trouble breathing.    Call your doctor now or seek immediate medical care if:    · You cough up blood.     · You have new or worse trouble breathing.     · You have a new or higher fever.     · You have a new rash.    Watch closely for changes in your health, and be sure to contact your doctor if:    · You cough more deeply or more often, especially if you notice more mucus or a change in the color of your mucus.     · You have new symptoms, such as a sore throat, an earache, or sinus pain.     · You do not get better as expected. Where can you learn more? Go to http://laina-taylor.info/. Enter D279 in the search box to learn more about \"Cough: Care Instructions. \"  Current as of: June 9, 2019  Content Version: 12.2  © 4594-1760 [employer]. Care instructions adapted under license by [employer] (which disclaims liability or warranty for this information). If you have questions about a medical condition or this instruction, always ask your healthcare professional. [place] [address] any warranty or liability for your use of this information. Patient Education        Elevated Blood Pressure: Care Instructions  Your Care Instructions    Blood pressure is a measure of how hard the blood pushes against the walls of your arteries. It's normal for blood pressure to go up and down throughout the day. But if it stays up over time, you have high blood pressure. Two numbers tell you your blood pressure. The first number is the systolic pressure. It shows how hard the blood pushes when your heart is pumping. The second number is the diastolic pressure. It shows how hard the blood pushes between heartbeats, when your heart is relaxed and filling with blood. An ideal blood pressure in adults is less than 120/80 (say \"120 over 80\"). High blood pressure is 140/90 or higher. You have high blood pressure if your top number is 140 or higher or your bottom number is 90 or higher, or both. The main test for high blood pressure is simple, fast, and painless. To diagnose high blood pressure, your doctor will test your blood pressure at different times. After testing your blood pressure, your doctor may ask you to test it again when you are home.   If you are diagnosed with high blood pressure, you can work with your doctor to make a long-term plan to manage it. Follow-up care is a key part of your treatment and safety. Be sure to make and go to all appointments, and call your doctor if you are having problems. It's also a good idea to know your test results and keep a list of the medicines you take. How can you care for yourself at home? · Do not smoke. Smoking increases your risk for heart attack and stroke. If you need help quitting, talk to your doctor about stop-smoking programs and medicines. These can increase your chances of quitting for good. · Stay at a healthy weight. · Try to limit how much sodium you eat to less than 2,300 milligrams (mg) a day. Your doctor may ask you to try to eat less than 1,500 mg a day. · Be physically active. Get at least 30 minutes of exercise on most days of the week. Walking is a good choice. You also may want to do other activities, such as running, swimming, cycling, or playing tennis or team sports. · Avoid or limit alcohol. Talk to your doctor about whether you can drink any alcohol. · Eat plenty of fruits, vegetables, and low-fat dairy products. Eat less saturated and total fats. · Learn how to check your blood pressure at home. When should you call for help? Call your doctor now or seek immediate medical care if:  ? · Your blood pressure is much higher than normal (such as 180/110 or higher). ? · You think high blood pressure is causing symptoms such as:  ¨ Severe headache. ¨ Blurry vision. ? Watch closely for changes in your health, and be sure to contact your doctor if:  ? · You do not get better as expected. Where can you learn more? Go to http://laina-taylor.info/. Enter Q616 in the search box to learn more about \"Elevated Blood Pressure: Care Instructions. \"  Current as of: September 21, 2016  Content Version: 11.4  © 8656-4092 [employer].  Care instructions adapted under license by [employer] (which disclaims liability or warranty for this information). If you have questions about a medical condition or this instruction, always ask your healthcare professional. [place] [address] any warranty or liability for your use of this information. Patient Education        Pneumonia: Care Instructions  Your Care Instructions    Pneumonia is an infection of the lungs. Most cases are caused by infections from bacteria or viruses. Pneumonia may be mild or very severe. If it is caused by bacteria, you will be treated with antibiotics. It may take a few weeks to a few months to recover fully from pneumonia, depending on how sick you were and whether your overall health is good. Follow-up care is a key part of your treatment and safety. Be sure to make and go to all appointments, and call your doctor if you are having problems. It's also a good idea to know your test results and keep a list of the medicines you take. How can you care for yourself at home? · Take your antibiotics exactly as directed. Do not stop taking the medicine just because you are feeling better. You need to take the full course of antibiotics. · Take your medicines exactly as prescribed. Call your doctor if you think you are having a problem with your medicine. · Get plenty of rest and sleep. You may feel weak and tired for a while, but your energy level will improve with time. · To prevent dehydration, drink plenty of fluids, enough so that your urine is light yellow or clear like water. Choose water and other caffeine-free clear liquids until you feel better. If you have kidney, heart, or liver disease and have to limit fluids, talk with your doctor before you increase the amount of fluids you drink. · Take care of your cough so you can rest. A cough that brings up mucus from your lungs is common with pneumonia. It is one way your body gets rid of the infection.  But if coughing keeps you from resting or causes severe fatigue and chest-wall pain, talk to your doctor. He or she may suggest that you take a medicine to reduce the cough. · Use a vaporizer or humidifier to add moisture to your bedroom. Follow the directions for cleaning the machine. · Do not smoke or allow others to smoke around you. Smoke will make your cough last longer. If you need help quitting, talk to your doctor about stop-smoking programs and medicines. These can increase your chances of quitting for good. · Take an over-the-counter pain medicine, such as acetaminophen (Tylenol), ibuprofen (Advil, Motrin), or naproxen (Aleve). Read and follow all instructions on the label. · Do not take two or more pain medicines at the same time unless the doctor told you to. Many pain medicines have acetaminophen, which is Tylenol. Too much acetaminophen (Tylenol) can be harmful. · If you were given a spirometer to measure how well your lungs are working, use it as instructed. This can help your doctor tell how your recovery is going. · To prevent pneumonia in the future, talk to your doctor about getting a flu vaccine (once a year) and a pneumococcal vaccine (one time only for most people). When should you call for help? Call 911 anytime you think you may need emergency care. For example, call if:    · You have severe trouble breathing.    Call your doctor now or seek immediate medical care if:    · You cough up dark brown or bloody mucus (sputum).     · You have new or worse trouble breathing.     · You are dizzy or lightheaded, or you feel like you may faint.    Watch closely for changes in your health, and be sure to contact your doctor if:    · You have a new or higher fever.     · You are coughing more deeply or more often.     · You are not getting better after 2 days (48 hours).     · You do not get better as expected. Where can you learn more? Go to http://laina-taylor.info/.   Enter [id number] in the search box to learn more about \"Pneumonia: Care Instructions. \"  Current as of: June 9, 2019  Content Version: 12.2  © 9372-4258 [employer]. Care instructions adapted under license by [employer] (which disclaims liability or warranty for this information). If you have questions about a medical condition or this instruction, always ask your healthcare professional. [place] [address] any warranty or liability for your use of this information.

## 2020-02-10 NOTE — ED PROVIDER NOTES
EMERGENCY DEPARTMENT HISTORY AND PHYSICAL EXAM    6:19 PM      Date: 2/10/2020  Patient Name: Guzman Lomeli    History of Presenting Illness     Chief Complaint   Patient presents with    Cough    Headache    Generalized Body Aches         History Provided By: Patient  Location/Duration/Severity/Modifying factors   Patient is a 59-year-old male with a history of behavioral disease the presents emergency department with a complaint of productive cough, body aches, headache, and concerned that his blood pressures been running high. Patient is on methadone maintenance and as blood pressure check today and was elevated. Patient concerned that his headache could be related to the fact his blood pressures been running high. Patient attributes this to the fact that he is gained over 20 pounds over the last 6 months and said this is the heaviest he is ever been. Patient denies any nausea, vomiting, numbness or tingling however is concerned that his blood pressure is an issue. Patient notes that he has had a productive cough for the last 2 days with  nasal congestion. Patient notes he is been having some chest pressure when he coughs but denies any calf tenderness or leg swelling. Patient admits to being a smoker and a drinker but denies any drug use. Patient is compliant with all of his medications and is currently on disability. Patient denies any other aggravating or alleviating factors. Patient describes the pain as dull 5 out of 10 without radiation. Patient denies any diaphoresis or family history of cardiac disease.           PCP: Carolina Boyd MD    Current Facility-Administered Medications   Medication Dose Route Frequency Provider Last Rate Last Dose    [START ON 2/11/2020] hydroCHLOROthiazide (HYDRODIURIL) tablet 25 mg  25 mg Oral DAILY Samantha Negrete MD         Current Outpatient Medications   Medication Sig Dispense Refill    QUEtiapine (SEROQUEL) 50 mg tablet Take 50 mg by mouth nightly.  methadone (METHADONE INTENSOL) 10 mg/mL solution Take 60 mg by mouth daily.  hydroCHLOROthiazide (HYDRODIURIL) 25 mg tablet Take 1 Tab by mouth daily for 14 days. 14 Tab 0    doxycycline (MONODOX) 100 mg capsule Take 1 Cap by mouth two (2) times a day for 10 days. 20 Cap 0    ibuprofen (MOTRIN) 600 mg tablet Take 1 Tab by mouth every six (6) hours as needed for Pain. 21 Tab 0       Past History     Past Medical History:  Past Medical History:   Diagnosis Date    Anxiety 10/11/2016    At risk of diabetes mellitus 12/18/2019    Continuous nicotine dependence 10/11/2016    Depression     Headache     Hep C w/o coma, chronic ([facility] [address] 10/11/2016    Hepatitis C     Insomnia 10/11/2016    Obesity, Class II, BMI 35-39.9 10/11/2016    Paranoid schizophrenia ([facility] [address]     Psychotic disorder ([facility] [address]     Schizophrenia ([facility] [address] 10/11/2016       Past Surgical History:  Past Surgical History:   Procedure Laterality Date    COLONOSCOPY N/A 3/30/2017    COL[facility] ENDOSCOPY    HX APPENDECTOMY  2000       Family History:  Family History   Problem Relation Age of Onset    Cancer Mother 36        colon    Cancer Maternal Aunt         breast    Diabetes Father        Social History:  Social History     Tobacco Use    Smoking status: Light Tobacco Smoker     Packs/day: 0.50     Years: 4.00     Pack years: 2.00    Smokeless tobacco: Never Used   Substance Use Topics    Alcohol use: Yes     Comment: 6 pack/week    Drug use: No     Types: Heroin     Comment: [facility]       Allergies: Allergies   Allergen Reactions    Pcn [Penicillins] Hives         Review of Systems       Review of Systems   Constitutional: Negative for chills, fatigue, fever and unexpected weight change. HENT: Positive for congestion. Negative for rhinorrhea. Respiratory: Positive for cough. Negative for chest tightness and shortness of breath.     Cardiovascular: Negative for chest pain, palpitations and leg swelling. Gastrointestinal: Negative for abdominal pain, nausea and vomiting. Genitourinary: Negative for dysuria. Musculoskeletal: Negative for back pain. Skin: Negative for rash. Neurological: Positive for headaches. Negative for dizziness and weakness. Psychiatric/Behavioral: The patient is not nervous/anxious. Physical Exam     Visit Vitals  /90 (BP 1 Location: Left arm, BP Patient Position: At rest)   Pulse (!) 104   Temp 99.2 °F (37.3 °C)   Resp 17   Ht 5' 10\" (1.778 m)   Wt 128.4 kg (283 lb)   SpO2 97%   BMI 40.61 kg/m²         Physical Exam  Vitals signs and nursing note reviewed. Constitutional:       General: He is not in acute distress. Appearance: He is well-developed. HENT:      Head: Normocephalic and atraumatic. Right Ear: External ear normal.      Left Ear: External ear normal.      Nose: Nose normal.   Eyes:      General: No scleral icterus. Conjunctiva/sclera: Conjunctivae normal.      Pupils: Pupils are equal, round, and reactive to light. Neck:      Musculoskeletal: Normal range of motion and neck supple. Thyroid: No thyromegaly. Vascular: No JVD. Trachea: No tracheal deviation. Cardiovascular:      Rate and Rhythm: Normal rate and regular rhythm. Heart sounds: Normal heart sounds. No murmur. No friction rub. No gallop. Comments: Heart rate 90 on exam  Pulmonary:      Effort: Pulmonary effort is normal.      Breath sounds: Rhonchi present. Chest:      Chest wall: No tenderness. Abdominal:      General: Bowel sounds are normal. There is no distension. Palpations: Abdomen is soft. Tenderness: There is no abdominal tenderness. There is no guarding or rebound. Musculoskeletal: Normal range of motion. General: No tenderness. Lymphadenopathy:      Cervical: No cervical adenopathy. Skin:     General: Skin is warm and dry.    Neurological:      Mental Status: He is alert and oriented to person, place, and time. Cranial Nerves: No cranial nerve deficit. Coordination: Coordination normal.      Comments: No sensory loss, Gait normal, Motor 5/5  No arm or leg drift   Psychiatric:         Behavior: Behavior normal.         Thought Content: Thought content normal.         Judgment: Judgment normal.           Diagnostic Study Results     Labs -  Recent Results (from the past 12 hour(s))   CBC WITH AUTOMATED DIFF    Collection Time: 02/10/20  3:48 PM   Result Value Ref Range    WBC 8.2 4.6 - 13.2 K/uL    RBC 5.04 4.70 - 5.50 M/uL    HGB 12.7 (L) 13.0 - 16.0 g/dL    HCT 40.2 36.0 - 48.0 %    MCV 79.8 74.0 - 97.0 FL    MCH 25.2 24.0 - 34.0 PG    MCHC 31.6 31.0 - 37.0 g/dL    RDW 15.3 (H) 11.6 - 14.5 %    PLATELET 728 921 - 835 K/uL    MPV 9.2 9.2 - 11.8 FL    NEUTROPHILS 64 40 - 73 %    LYMPHOCYTES 27 21 - 52 %    MONOCYTES 8 3 - 10 %    EOSINOPHILS 1 0 - 5 %    BASOPHILS 0 0 - 2 %    ABS. NEUTROPHILS 5.2 1.8 - 8.0 K/UL    ABS. LYMPHOCYTES 2.2 0.9 - 3.6 K/UL    ABS. MONOCYTES 0.7 0.05 - 1.2 K/UL    ABS. EOSINOPHILS 0.1 0.0 - 0.4 K/UL    ABS.  BASOPHILS 0.0 0.0 - 0.1 K/UL    DF AUTOMATED     CARDIAC PANEL,(CK, CKMB & TROPONIN)    Collection Time: 02/10/20  3:48 PM   Result Value Ref Range     39 - 308 U/L    CK - MB 4.2 (H) <3.6 ng/ml    CK-MB Index 1.7 0.0 - 4.0 %    Troponin-I, QT <0.02 0.0 - 8.989 NG/ML   METABOLIC PANEL, COMPREHENSIVE    Collection Time: 02/10/20  3:48 PM   Result Value Ref Range    Sodium 141 136 - 145 mmol/L    Potassium 4.1 3.5 - 5.5 mmol/L    Chloride 102 100 - 111 mmol/L    CO2 30 21 - 32 mmol/L    Anion gap 9 3.0 - 18 mmol/L    Glucose 147 (H) 74 - 99 mg/dL    BUN 11 7.0 - 18 MG/DL    Creatinine 1.16 0.6 - 1.3 MG/DL    BUN/Creatinine ratio 9 (L) 12 - 20      GFR est AA >60 >60 ml/min/1.73m2    GFR est non-AA >60 >60 ml/min/1.73m2    Calcium 8.8 8.5 - 10.1 MG/DL    Bilirubin, total 0.3 0.2 - 1.0 MG/DL    ALT (SGPT) 49 16 - 61 U/L    AST (SGOT) 48 (H) 10 - 38 U/L    Alk. phosphatase 93 45 - 117 U/L    Protein, total 7.6 6.4 - 8.2 g/dL    Albumin 3.4 3.4 - 5.0 g/dL    Globulin 4.2 (H) 2.0 - 4.0 g/dL    A-G Ratio 0.8 0.8 - 1.7     EKG, 12 LEAD, INITIAL    Collection Time: 02/10/20  4:19 PM   Result Value Ref Range    Ventricular Rate 107 BPM    Atrial Rate 107 BPM    P-R Interval 138 ms    QRS Duration 80 ms    Q-T Interval 368 ms    QTC Calculation (Bezet) 491 ms    Calculated P Axis 61 degrees    Calculated R Axis 2 degrees    Calculated T Axis 26 degrees    Diagnosis       Sinus tachycardia  Nonspecific T wave abnormality  Abnormal ECG  When compared with ECG of 07-NOV-2019 11:38,  No significant change was found         Radiologic Studies -   XR CHEST PA LAT   Final Result   IMPRESSION:      1. Findings concerning for lingular infiltrate. Pneumonia not excluded. Medical Decision Making   I am the first provider for this patient. I reviewed the vital signs, available nursing notes, past medical history, past surgical history, family history and social history. Vital Signs-Reviewed the patient's vital signs. EKG: Sinus tach at 107 no STEMI interpreted by me    Records Reviewed: Nursing Notes and Old Medical Records (Time of Review: 6:19 PM)    ED Course: Progress Notes, Reevaluation, and Consults: Workup and recommendations were reviewed with the patient and all questions were answered. The patient understands the plan and will proceed with close outpatient care. I have encouraged the patient to return if at all worsened or concerned.    Gelacio Rainey, DO 6:25 PM      Provider Notes (Medical Decision Making):   MDM  Number of Diagnoses or Management Options  Community acquired pneumonia of left lung, unspecified part of lung:   Cough:   Elevated blood pressure reading:   Diagnosis management comments: Patient is a 59-year-old male with a history of behavioral disease and smoking the presents emergency department with a complaint of 2 days of increasing cough, congestion, headache, and chest pain when he coughs and takes a deep breath. Patient is chest x-ray suggestive of a lingular pneumonia and his cardiac labs are reassuring. Patient has a mildly elevated heart rate with a temp of 99.2 and suspect this is related to infection. We will start the patient on Augmentin I discussed with him his blood pressure being elevated and he would prefer that he start him on a blood pressure medication for now and follow with his primary doctor and see if it is improving. I told this was reassuring especially if he thinks his blood pressures been elevated for quite some time and has not followed with his doctor. We will give the patient hydrochlorothiazide as he also has some dependent edema periodically and have him follow closely with his primary doctor this week for blood pressure recheck and will give him only a two-week supply of the hydrochlorothiazide. Patient understands the emergency department is not a replacement for his primary care and will make sure to follow things closely. In addition I do not suspect acute cardiac process today and do not suspect central event based on my evaluation. We will proceed with close outpatient care and have him return if at all worsened or concerned. Kristian Like, DO 6:24 PM        Procedures        Diagnosis     Clinical Impression:   1. Community acquired pneumonia of left lung, unspecified part of lung    2. Elevated blood pressure reading    3.  Cough        Disposition: DC    Follow-up Information     Follow up With Specialties Details Why Contact Info    Jimy Mujica MD Internal Medicine In 2 days For blood pressure recheck reevaluation [facility] [phone] [id number] [phone] [id number] [facility] EMERGENCY DEPT Emergency Medicine  As needed, If symptoms worsen Jason Ortiz [id number] [phone] Discharge Medication List as of 2/10/2020  5:23 PM      START taking these medications    Details   hydroCHLOROthiazide (HYDRODIURIL) 25 mg tablet Take 1 Tab by mouth daily for 14 days. , Normal, Disp-14 Tab, R-0      doxycycline (MONODOX) 100 mg capsule Take 1 Cap by mouth two (2) times a day for 10 days. , Normal, Disp-20 Cap, R-0      ibuprofen (MOTRIN) 600 mg tablet Take 1 Tab by mouth every six (6) hours as needed for Pain., Normal, Disp-20 Tab, R-0         CONTINUE these medications which have NOT CHANGED    Details   QUEtiapine (SEROQUEL) 50 mg tablet Take 50 mg by mouth nightly., Historical Med      methadone (METHADONE INTENSOL) 10 mg/mL solution Take 60 mg by mouth daily. , Historical Med           Disclaimer: Sections of this note are dictated using utilizing voice recognition software. Minor typographical errors may be present. If questions arise, please do not hesitate to contact me or call our department.

## 2020-02-11 LAB
ATRIAL RATE: 107 BPM
CALCULATED P AXIS, ECG09: 61 DEGREES
CALCULATED R AXIS, ECG10: 2 DEGREES
CALCULATED T AXIS, ECG11: 26 DEGREES
DIAGNOSIS, 93000: NORMAL
P-R INTERVAL, ECG05: 138 MS
Q-T INTERVAL, ECG07: 368 MS
QRS DURATION, ECG06: 80 MS
QTC CALCULATION (BEZET), ECG08: 491 MS
VENTRICULAR RATE, ECG03: 107 BPM

## 2020-02-19 ENCOUNTER — OFFICE VISIT (OUTPATIENT)
Dept: PULMONOLOGY | Age: 45
End: 2020-02-19

## 2020-02-19 VITALS
HEIGHT: 70 IN | WEIGHT: 283.6 LBS | DIASTOLIC BLOOD PRESSURE: 86 MMHG | OXYGEN SATURATION: 94 % | SYSTOLIC BLOOD PRESSURE: 141 MMHG | RESPIRATION RATE: 20 BRPM | TEMPERATURE: 98.5 F | HEART RATE: 81 BPM | BODY MASS INDEX: 40.6 KG/M2

## 2020-02-19 DIAGNOSIS — G47.33 OSA (OBSTRUCTIVE SLEEP APNEA): ICD-10-CM

## 2020-02-19 DIAGNOSIS — F11.90 METHADONE USE: ICD-10-CM

## 2020-02-19 DIAGNOSIS — E66.01 MORBID OBESITY WITH BMI OF 40.0-44.9, ADULT (HCC): ICD-10-CM

## 2020-02-19 DIAGNOSIS — K21.9 GASTROESOPHAGEAL REFLUX DISEASE, ESOPHAGITIS PRESENCE NOT SPECIFIED: ICD-10-CM

## 2020-02-19 DIAGNOSIS — J18.9 COMMUNITY ACQUIRED PNEUMONIA, UNSPECIFIED LATERALITY: ICD-10-CM

## 2020-02-19 DIAGNOSIS — F17.210 CIGARETTE NICOTINE DEPENDENCE WITHOUT COMPLICATION: ICD-10-CM

## 2020-02-19 DIAGNOSIS — F39 MOOD DISORDER (HCC): ICD-10-CM

## 2020-02-19 DIAGNOSIS — R91.8 PULMONARY INFILTRATE: Primary | ICD-10-CM

## 2020-02-19 NOTE — PROGRESS NOTES
[facility]  Pulmonary, Critical Care, and Sleep Medicine    Office Progress Note: Follow up      Primary Care Physician: Kelsi Hernandez MD     Reason for Visit:  Follow up CAP and VINCENT    Assessment:  1. CAP: Diagnosed on 2/10/20- Completing course of Doxycycline. Cough improving but not resolved  2. Obstructive Sleep Apnea (VINCENT)- Severe (AHI: 41.7)- Patient has not been using his device. He states that he does not like his current mask. I have had an in depth discussion with the patient today. He has notabel hypoxia associated with his apnea events. Potential risks to untreated VINCENT discussed. Patient strongly encouraged to restart therapy. 3. Tachycardia: Unclear etiology- denies anxiety, + caffeine and nicotine use, no current pain, normal thyroid functions studies. VINCENT may be playing contributing role. May need further evaluation- better today  4. GERD: Symptomatic with report of high level reflux and food contents regurgitating into mouth. 5. History of Heroine addiction; Quit [id number]- on methadone maintenance program  6. Nicotine dependence:  Continues to smoke  7. H/O Hepatitis C- treated with Harvoni  8. Obesity: Body mass index is 40.69 kg/m². 9. Possible mood/psychiatric disorder: Patient with prior report of Schizophrenia, and anxiety- Currently taking Seroquel        Plan:  · He needs a follow up CXR- order placed today. · I have asked patient to restart his PAP therapy. I will ask for a mask refit but given the time that has passed and poor compliance DME may not be able to meet this request.  I have asked the patient to contact our clinic for any difficulties with using his device. · Potential consequences of untreated sleep apnea, and/or excessive daytime sleepiness were discussed with the patient. · Treatment options including CPAP, dental appliance, weight reduction measures, positional therapy, surgeries etc were discussed.   · Healthy lifestyle changes to include weight loss and exercise discussed. · Healthy sleep habits were reviewed and encouraged. · Smoking cessation encouraged  ·  and workplace safety reviewed and discussed as appropriate. Drowsy and/or inattentive driving should be avoided. · Follow-up in 1 month should new symptoms or problems arise or if his respiratory symptoms do no resolved. · Follow up with Primary Care Provider (PCP) as directed and for, GERD and  routine health care maintenance. History of Present Illness: Mr. Hernandez Parrish is a 40 y.o. male patient who was last seen in our clinic in August of 2019 for a sleep evaluation. He was subsequently started on PAP therapy. He has not followed up until today. CAP:  On 2/10/20, th e patient presented to the ED for productive cough  With yellow- blood tinged sputum. He was subsequently diagnosed with  CAP. He was discharged on antibiotics which he has just about completed. His cough and sputum production are improving but not resolved. No Hemoptysis. No fever. No abdominal pain, nausea or emesis. He reports he is feeling better but not back to his baseline. He also has a history of high level reflux. Tobacco:   · He continues to smoke    Substance Abuse:  · He continues to take methadone    VINCENT:  In regards to his VINCENT, the patient was diagnosed with severe VINCENT. He was started on APAP 10/20 cwp. · He has not been using his device. · He states he does not like his mask       Prior Sleep History Intake:  Occupation:   On Disability; [employer], [address]. Driving:  yes  Drowsy Driving: is reported on drives longer than one hour. He is limiting his driving. Motor vehicle accident(s) associated with drowsy driving have not occurred. Naps: are reported and occur spontaneously sitting in a chair or on the couch. This normally occurs between 7352-2008.     Leg Symptoms/Pain: He does not have unpleasant or crawling sensation in legs or strong urge to move when inactive. He does have some chronic right pain. GERD: is reported and does disturb his sleep. Typically happens after eating spicy foods; 2-3 times/ week. He will take OTC and Rolaids. He does note some rare water brash and food contents refluxing into his mouth while sleeping. Mood: He feels that he is happy but his fiance has told him that he is gee. Patient has a prior history of Schizophrenia, depression and anxiety. He states he was seen by mental health several years ago when he was still using heroin. He has not taken medication for years, although he is taking Wellbutrin. He recently started going to \"[facility]\" for counseling. He denies any auditory or visual hallucinations. He denies feeling anxious. Sleep-Wake History:       Estimates sleeping approximately 4-5 hours per night/day. Reports sleeping in a Bed with 2 pillows. He gets into bed at approximately 2200. Once in bed,he watches some TV until he dozes off. It usually takes up to 30 minutes to fall asleep after going to bed. He is able to sleep until about [id number] and then he is gasping awake. Reports waking up to use the bathroom 1 time nightly. Family Sleep History:   - Brother: VINCENT        Stop Jaqui Mata 8/7/2019   Does the patient snore loudly (louder than talking or loud enough to be heard through closed doors)? 1   Does the patient often feel tired, fatigued, or sleepy during the daytime, even after a \"good\" night's sleep? 1   Has anyone ever observed the patient stop breathing during their sleep? 1   Does the patient have or are they being treated for high blood pressure? 0   Is the patient's BMI greater than 35? 1   Is your neck circumference greater than 17 inches (Male) or 16 inches (Female)? 1   Is the patient older than 48? 0   Is the patient male?  1   VINCENT Score 6       3 most recent PHQ Screens 4/4/2019   PHQ Not Done -   Little interest or pleasure in doing things Not at all   Feeling down, depressed, irritable, or hopeless Not at all   Total Score PHQ 2 0       [place] Scale 2/19/2020 8/7/2019   Sitting and Reading 2 1   Watching TV 3 3   Sitting, inactive in a public place (e.g. a movie theater or meeting) 2 1   As a passenger in a car for an hour, without a break 3 3   Lying down to rest in the afternoon, when circumstances permit 3 1   Sitting and talking to someone 0 1   Sitting quietly after lunch without alcohol 1 1   In a car, while stopped for a few minutes in traffic 1 0   [place] Sleepiness Score 15 11                  Past Medical History:  Past Medical History:   Diagnosis Date    Anxiety 10/11/2016    At risk of diabetes mellitus 12/18/2019    Continuous nicotine dependence 10/11/2016    Depression     Headache     [facility]o [facility] [address] 10/11/2016    Hepatitis C     Insomnia 10/11/2016    Obesity, Class II, BMI 35-39.9 10/11/2016    [facility]anoid schizophrenia ([facility] [address]     Psychotic disorder ([facility] [address]     Schizophrenia ([facility] [address] 10/11/2016       Past Surgical History:  Past Surgical History:   Procedure Laterality Date    COLONOSCOPY N/A 3/30/2017    [facility]ute [facility]COPY    HX APPENDECTOMY  2000       Family History:  Family History   Problem Relation Age of Onset    Cancer Mother 36        colon    Cancer Maternal Aunt         breast    Diabetes Father        Social History:     Caffeine Amount Time of last Intake Comments   Coffee 1 C/am     Soda Rare     Tea 2-3 bottles/day  Sweet tea   Energy Drinks None     Over- the - counter stimulant pills None     Other Substances      Alcohol Rare  Vodka   Tobacco 3/4 PPD  3-4 years, ilan Cigarettes    Drugs Prior Heroin Quit 2014-15 On methadone x 5 years       Medications:  Current Outpatient Medications on File Prior to Visit   Medication Sig Dispense Refill    QUEtiapine (SEROQUEL) 50 mg tablet Take 50 mg by mouth nightly.       methadone (METHADONE INTENSOL) 10 mg/mL solution Take 60 mg by mouth daily.  doxycycline (MONODOX) 100 mg capsule Take 1 Cap by mouth two (2) times a day for 10 days. 20 Cap 0    hydroCHLOROthiazide (HYDRODIURIL) 25 mg tablet Take 1 Tab by mouth daily for 14 days. 14 Tab 0    ibuprofen (MOTRIN) 600 mg tablet Take 1 Tab by mouth every six (6) hours as needed for Pain. 20 Tab 0     No current facility-administered medications on file prior to visit. - Patient now on methadone liquid and is taking 55 mg daily,    Allergy:  Allergies   Allergen Reactions    Pcn [Penicillins] Hives       Review of Systems  General ROS: positive for  - fatigue and sleep disturbance,    negative for - chills, fever, hot flashes, malaise, night sweats or weight loss  ENT ROS: negative  Hematological and Lymphatic ROS: negative for - bleeding problems, blood clots, bruising, jaundice, pallor or swollen lymph nodes  Endocrine ROS: negative for - polydipsia/polyuria, skin changes, temperature intolerance or unexpected weight changes  Respiratory ROS: +cough, shortness of breath, , no wheezing  Cardiovascular ROS: positive for - rapid heart rate  negative for - chest pain, dyspnea on exertion, edema, loss of consciousness, murmur, orthopnea or shortness of breath  Gastrointestinal ROS: no abdominal pain, change in bowel habits, or black or bloody stools  Genito-Urinary ROS: no dysuria, trouble voiding, or hematuria  Musculoskeletal ROS: as above  Neurological ROS: no TIA or stroke symptoms  Dermatological ROS: negative for - pruritus, rash or skin lesion changes   Psychological ROS: as above   Otherwise negative and as above      Physical Exam:    Blood pressure 141/86, pulse 81, temperature 98.5 °F (36.9 °C), temperature source Oral, resp. rate 20, height 5' 10\" (1.778 m), weight 128.6 kg (283 lb 9.6 oz), SpO2 94 %. on RA, Body mass index is 40.69 kg/m².      General: No distress, acyanotic, appears stated age, cooperative, pleasant  HEENT: PERRL, EOMI, throat without erythema or exudate, Tongue- large,  dental indention on tongue, + partial dentures, Mallampati's score 4+, Uvula- widen, + edema, dependent into hypopharynx- midline, Tonsils- not seen. Neck: Supple,  no abnormally enlarged lymph nodes, thyroid is not enlarged, non-tender, No JVD, No carotid bruits  Chest: Normal.  Lungs: Moderate air entry, clear to auscultation bilaterally- no crackles- no wheezing  Heart:  Regular rhythm, rapid rate; low 100 bpm, S1S2 present, without murmur. Abdomen: Obese, bowel sounds normoactive, abdomen is soft without significant tenderness, or guarding. Extremity: Negative for cyanosis, edema, or clubbing. Skin: Skin color, texture, turgor normal. No rashes or lesions. Neurological: CN 2-12 grossly intact, normal muscle tone.     Data Reviewed:  CBC:   Lab Results   Component Value Date/Time    WBC 8.2 02/10/2020 03:48 PM    HGB 12.7 (L) 02/10/2020 03:48 PM    HCT 40.2 02/10/2020 03:48 PM    PLATELET 028 07/64/6205 03:48 PM    MCV 79.8 02/10/2020 03:48 PM       BMP:   Lab Results   Component Value Date/Time    Sodium 141 02/10/2020 03:48 PM    Potassium 4.1 02/10/2020 03:48 PM    Chloride 102 02/10/2020 03:48 PM    CO2 30 02/10/2020 03:48 PM    Anion gap 9 02/10/2020 03:48 PM    Glucose 147 (H) 02/10/2020 03:48 PM    BUN 11 02/10/2020 03:48 PM    Creatinine 1.16 02/10/2020 03:48 PM    BUN/Creatinine ratio 9 (L) 02/10/2020 03:48 PM    GFR est AA >60 02/10/2020 03:48 PM    GFR est non-AA >60 02/10/2020 03:48 PM    Calcium 8.8 02/10/2020 03:48 PM        TSH:  Lab Results   Component Value Date/Time    TSH 1.36 10/07/2019 09:25 AM    TSH 1.700 04/11/2019 08:31 AM    TSH 1.58 06/13/2013 10:52 AM     Lab Results   Component Value Date/Time    Hemoglobin A1c 6.6 (H) 04/11/2019 08:31 AM    Hemoglobin A1c (POC) 6.0 12/18/2019 01:49 PM       Imaging:  [x]I have personally reviewed the patients radiographs section     CXR: 5/11/17  EXAM: Chest, PA and lateral     INDICATION: Cough, fever     COMPARISON: 3/19/2017     FINDINGS:     Cardiac silhouette and pulmonary vessels are normal. Lung volumes are low with  crowded basal markings. No acute consolidation. Costophrenic angles are sharp. No pneumothorax. No change.     IMPRESSION:     No active disease or interval change. Results from [place] encounter on 02/10/20   XR CHEST PA LAT    Narrative PA And Lateral Chest    CPT CODE: 65986    COMPARISON: 5/11/2017. CLINICAL INFORMATION: Cough and chest tightness. FINDINGS:    Increased density in the lingula. The main to the lungs are clear. No effusion  or pneumothorax. No acute osseous abnormality. .      Impression IMPRESSION:    1. Findings concerning for lingular infiltrate. Pneumonia not excluded. No results found for this or any previous visit.           Historical Sleep Testing Data:      - HST: 8/27/29: AHI: 47.9, SpO2 average: 87%, SpO2 fam: 69%      Gian Mosley DO, FCCP  Pulmonary, Sleep and Critical Care Medicine

## 2020-02-19 NOTE — LETTER
2/22/20 Patient: Issa Mas YOB: 1975 Date of Visit: 2/19/2020 Honey Davenport MD 
[id number] [facility] [phone] VIA In Basket Dear Honey Davenport MD, Thank you for referring Mr. Issa Mas to [address] [place] [address] for evaluation. My notes for this consultation are attached. If you have questions, please do not hesitate to call me. I look forward to following your patient along with you.  
 
 
Sincerely, 
 
Eric Sinclair, DO

## 2020-02-19 NOTE — PROGRESS NOTES
Shanon Meza presents today for   Chief Complaint   Patient presents with    Sleep Apnea    Sleep Problem     Snoring. Excessive daytime sleepiness. Is someone accompanying this pt? No    Is the patient using any DME equipment during OV? CPAP    -[facility]. Depression Screening:  3 most recent PHQ Screens 4/4/2019   PHQ Not Done -   Little interest or pleasure in doing things Not at all   Feeling down, depressed, irritable, or hopeless Not at all   Total Score PHQ 2 0       Learning Assessment:  Learning Assessment 10/11/2016   PRIMARY LEARNER Patient   PRIMARY LANGUAGE ENGLISH   LEARNER PREFERENCE PRIMARY VIDEOS   ANSWERED BY patient   RELATIONSHIP SELF       Abuse Screening:  Abuse Screening Questionnaire 4/4/2019   Do you ever feel afraid of your partner? N   Are you in a relationship with someone who physically or mentally threatens you? N   Is it safe for you to go home? Y       Fall Risk  No flowsheet data found. Coordination of Care:  1. Have you been to the ER, urgent care clinic since your last visit? Hospitalized since your last visit? Yes; Name: Baljit Gaytan 2/10 + 10/7 Cough    2. Have you seen or consulted any other health care providers outside of the [address] [place] [address] since your last visit? Include any pap smears or colon screening.  No.

## 2020-02-28 ENCOUNTER — HOSPITAL ENCOUNTER (OUTPATIENT)
Dept: GENERAL RADIOLOGY | Age: 45
Discharge: HOME | End: 2020-02-28
Payer: MEDICAID

## 2020-02-28 DIAGNOSIS — R91.8 PULMONARY INFILTRATE: ICD-10-CM

## 2020-02-28 PROCEDURE — 71046 X-RAY EXAM CHEST 2 VIEWS: CPT

## 2020-03-13 ENCOUNTER — OFFICE VISIT (OUTPATIENT)
Dept: CARDIOLOGY CLINIC | Age: 45
End: 2020-03-13

## 2020-03-13 VITALS
SYSTOLIC BLOOD PRESSURE: 150 MMHG | HEART RATE: 105 BPM | OXYGEN SATURATION: 97 % | WEIGHT: 275 LBS | HEIGHT: 70 IN | BODY MASS INDEX: 39.37 KG/M2 | DIASTOLIC BLOOD PRESSURE: 92 MMHG

## 2020-03-13 DIAGNOSIS — R00.2 PALPITATION: Primary | ICD-10-CM

## 2020-03-13 RX ORDER — METOPROLOL SUCCINATE 25 MG/1
TABLET, EXTENDED RELEASE ORAL DAILY
COMMUNITY
End: 2020-03-13

## 2020-03-13 RX ORDER — METOPROLOL SUCCINATE 50 MG/1
50 TABLET, EXTENDED RELEASE ORAL DAILY
Qty: 30 TAB | Refills: 6 | Status: SHIPPED | OUTPATIENT
Start: 2020-03-13 | End: 2020-11-30

## 2020-03-13 NOTE — PROGRESS NOTES
Brain Lev    CP, Palps    HPI    Scott Kauffman is a 40 y.o. AAM with no known coronary disease here for evaluation of chest pain and worsening palpitations. As you know patient was recently formally diagnosed with sleep apnea and has been newly wearing his CPAP for about a month now. Despite this he says he has been experiencing fluttering sensations in his heart almost every day. He does have a smart watch and he is seen his heart rate monitor go into the 120s when he is just sitting at rest-then quickly go back down into the 90s. He can feel kind of winded in the moment when he gets several palpitations in a row and then he feels a tightness or discomfort in his chest.  History and ER records reviewed with patient at initial eval.    He comes today for follow up. I trailed low dose BB. He feels great and can notice an improvement. His HRs are much better but he still elevated. He recently had signs and symptoms of PNA so is getting over that as well. (CXRs reviewed with him at his request)    His CP overall is not a major concern at this point, he thinks he slept wrong and says he feels it in his side/ back and more so if he twists/ turns.     CV RFs: Remote h/o polysubstance abuse (cocaine + IVDA/ heroin)    Past Medical History:   Diagnosis Date    Anxiety 10/11/2016    At risk of diabetes mellitus 12/18/2019    Continuous nicotine dependence 10/11/2016    Depression     Headache     [address] C w/o coma, chronic ([facility] [address] 10/11/2016    Hepatitis C     Insomnia 10/11/2016    Obesity, Class II, BMI 35-39.9 10/11/2016    Paranoid schizophrenia ([facility] [address]     Psychotic disorder ([facility] [address]     Schizophrenia ([facility] [address] 10/11/2016       Past Surgical History:   Procedure Laterality Date    COLONOSCOPY N/A 3/30/2017    COL[facility] Quintin Sharp [facility] ENDOSCOPY    HX APPENDECTOMY  2000       Current Outpatient Medications   Medication Sig Dispense Refill    metoprolol succinate (TOPROL-XL) 25 mg XL tablet Take  by mouth daily.  QUEtiapine (SEROQUEL) 50 mg tablet Take 50 mg by mouth nightly.  methadone (METHADONE INTENSOL) 10 mg/mL solution Take 60 mg by mouth daily.  ibuprofen (MOTRIN) 600 mg tablet Take 1 Tab by mouth every six (6) hours as needed for Pain.  20 Tab 0       Allergies   Allergen Reactions    Pcn [Penicillins] Hives       Social History     Socioeconomic History    Marital status: LEGALLY      Spouse name: Not on file    Number of children: Not on file    Years of education: Not on file    Highest education level: Not on file   Occupational History    Not on file   Social Needs    Financial resource strain: Not on file    Food insecurity     Worry: Not on file     Inability: Not on file    Transportation needs     Medical: Not on file     Non-medical: Not on file   Tobacco Use    Smoking status: Light Tobacco Smoker     Packs/day: 0.50     Years: 4.00     Pack years: 2.00    Smokeless tobacco: Never Used   Substance and Sexual Activity    Alcohol use: Yes     Comment: 6 pack/week    Drug use: No     Types: Heroin     Comment: [facility]: [facility]    Sexual activity: Yes     Partners: Female   Lifestyle    Physical activity     Days per week: Not on file     Minutes per session: Not on file    Stress: Not on file   Relationships    Social connections     Talks on phone: Not on file     Gets together: Not on file     Attends Restoration service: Not on file     Active member of club or organization: Not on file     Attends meetings of clubs or organizations: Not on file     Relationship status: Not on file    Intimate partner violence     Fear of current or ex partner: Not on file     Emotionally abused: Not on file     Physically abused: Not on file     Forced sexual activity: Not on file   Other Topics Concern    Not on file   Social History Narrative    Not on file        The patient has a family history of    Review of Systems    15 pt Review of Systems is negative unless otherwise mentioned in the HPI. Wt Readings from Last 3 Encounters:   03/13/20 124.7 kg (275 lb)   02/19/20 128.6 kg (283 lb 9.6 oz)   02/10/20 128.4 kg (283 lb)     Temp Readings from Last 3 Encounters:   02/19/20 98.5 °F (36.9 °C) (Oral)   02/10/20 99.2 °F (37.3 °C)   12/18/19 98.4 °F (36.9 °C) (Oral)     BP Readings from Last 3 Encounters:   03/13/20 (!) 150/92   02/19/20 141/86   02/10/20 148/90     Pulse Readings from Last 3 Encounters:   03/13/20 (!) 105   02/19/20 81   02/10/20 (!) 104       Physical Exam:    Visit Vitals  BP (!) 150/92   Pulse (!) 105   Ht 5' 10\" (1.778 m)   Wt 124.7 kg (275 lb)   SpO2 97%   BMI 39.46 kg/m²      Physical Exam  HENT:      Head: Normocephalic and atraumatic. Eyes:      General: No scleral icterus. Pupils: Pupils are equal, round, and reactive to light. Cardiovascular:      Rate and Rhythm: Regular rhythm. Heart sounds: Normal heart sounds. No murmur. No friction rub. No gallop. Comments: Tachy S1, S2, slight irregularity noted- PAC? Pulmonary:      Effort: Pulmonary effort is normal. No respiratory distress. Breath sounds: Normal breath sounds. No wheezing or rales. Chest:      Chest wall: No tenderness. Abdominal:      General: Bowel sounds are normal.      Palpations: Abdomen is soft. Skin:     General: Skin is warm and dry. Findings: No rash. Neurological:      Mental Status: He is alert and oriented to person, place, and time.          EKG today shows: Sinus tachycardia, normal axis and intervals, no ST segment abnormalities    Lab Results   Component Value Date/Time    TSH 1.36 10/07/2019 09:25 AM     Lab Results   Component Value Date/Time    Sodium 141 02/10/2020 03:48 PM    Potassium 4.1 02/10/2020 03:48 PM    Chloride 102 02/10/2020 03:48 PM    CO2 30 02/10/2020 03:48 PM    Anion gap 9 02/10/2020 03:48 PM    Glucose 147 (H) 02/10/2020 03:48 PM    BUN 11 02/10/2020 03:48 PM    Creatinine 1.16 02/10/2020 03:48 PM    BUN/Creatinine ratio 9 (L) 02/10/2020 03:48 PM    GFR est AA >60 02/10/2020 03:48 PM    GFR est non-AA >60 02/10/2020 03:48 PM    Calcium 8.8 02/10/2020 03:48 PM    Bilirubin, total 0.3 02/10/2020 03:48 PM    AST (SGOT) 48 (H) 02/10/2020 03:48 PM    Alk. phosphatase 93 02/10/2020 03:48 PM    Protein, total 7.6 02/10/2020 03:48 PM    Albumin 3.4 02/10/2020 03:48 PM    Globulin 4.2 (H) 02/10/2020 03:48 PM    A-G Ratio 0.8 02/10/2020 03:48 PM    ALT (SGPT) 49 02/10/2020 03:48 PM     Magnesium   Date Value Ref Range Status   10/07/2019 1.9 1.6 - 2.6 mg/dL Final        Stress Echo: 11/7/19  Baseline Echocardiographic Data Normal baseline left ventricular cavity size and wall motion. Low normal left ventricular systolic function. EF estimated at 50% No pericardial effusion. Stress Echocardiographic Data Left ventricular cavity size decreased from baseline. Left ventricular systolic function improved from baseline. Normal wall motion, unchanged from baseline. Study Impression Image quality is adequate. Normal stress echocardiogram. Low risk study. Impression and Plan:  Nyla Singh is a 40 y. o. with:    1.) Tachycardia, almost daily palpitations, anxiety induced +/- IST  2.) Atypical CP at rest/ associated with palpitations  3.) VINCENT, on CPAP  4.) H/o polysubstance abuse  5.) Negative stress echo results- dw pt today    1.) Increase Metoprolol to 50 SR take qhs  2.) RTC 3 months recheck, if side effects, please call me back sooner    All questions answered  Encouraged continued CPAP use    Thank you for allowing me to participate in the care of your patient, please do not hesitate to call with questions or concerns.     [address],    Cecilia Ball,

## 2020-03-24 ENCOUNTER — TELEPHONE (OUTPATIENT)
Dept: PULMONOLOGY | Age: 45
End: 2020-03-24

## 2020-03-24 NOTE — TELEPHONE ENCOUNTER
Patient has appointment with Dr. Becki Patterson on 3/30/2020, office pulling compliance reports in preparation for ppointment. Patient has been currently having some trouble with compliance. Reaching out to patient to try and find out why and what we can do to help.

## 2020-05-08 NOTE — ED TRIAGE NOTES
Patient c/o cough, chest tightness, blood pressure elevation and headaches x couple days. Denies chest pain or shortness of breath.
Statement Selected

## 2020-06-30 ENCOUNTER — TELEPHONE (OUTPATIENT)
Dept: PULMONOLOGY | Age: 45
End: 2020-06-30

## 2020-06-30 NOTE — TELEPHONE ENCOUNTER
Attempted to reach the pt. To check on CPap compliance. Resmed shows use only 2/29 - 5/28/20. The mail box is full and cannot accept messages. Proposed appt. With Dr. Trip White has been cancelled.

## 2020-08-19 ENCOUNTER — APPOINTMENT (OUTPATIENT)
Dept: GENERAL RADIOLOGY | Age: 45
End: 2020-08-19
Attending: NURSE PRACTITIONER
Payer: MEDICAID

## 2020-08-19 ENCOUNTER — HOSPITAL ENCOUNTER (EMERGENCY)
Age: 45
Discharge: HOME | End: 2020-08-19
Attending: EMERGENCY MEDICINE | Admitting: EMERGENCY MEDICINE
Payer: MEDICAID

## 2020-08-19 VITALS
WEIGHT: 275 LBS | RESPIRATION RATE: 20 BRPM | SYSTOLIC BLOOD PRESSURE: 114 MMHG | OXYGEN SATURATION: 94 % | TEMPERATURE: 99.5 F | BODY MASS INDEX: 39.37 KG/M2 | HEART RATE: 98 BPM | DIASTOLIC BLOOD PRESSURE: 84 MMHG | HEIGHT: 70 IN

## 2020-08-19 DIAGNOSIS — Z20.2 EXPOSURE TO STD: ICD-10-CM

## 2020-08-19 DIAGNOSIS — Z20.822 SUSPECTED COVID-19 VIRUS INFECTION: Primary | ICD-10-CM

## 2020-08-19 LAB
ALBUMIN SERPL-MCNC: 3.1 G/DL (ref 3.4–5)
ALBUMIN/GLOB SERPL: 0.7 {RATIO} (ref 0.8–1.7)
ALP SERPL-CCNC: 67 U/L (ref 45–117)
ALT SERPL-CCNC: 58 U/L (ref 16–61)
ANION GAP SERPL CALC-SCNC: 4 MMOL/L (ref 3–18)
AST SERPL-CCNC: 46 U/L (ref 10–38)
BASOPHILS # BLD: 0 K/UL (ref 0–0.1)
BASOPHILS NFR BLD: 0 % (ref 0–2)
BILIRUB SERPL-MCNC: 0.3 MG/DL (ref 0.2–1)
BNP SERPL-MCNC: 7 PG/ML (ref 0–450)
BUN SERPL-MCNC: 11 MG/DL (ref 7–18)
BUN/CREAT SERPL: 11 (ref 12–20)
CALCIUM SERPL-MCNC: 8 MG/DL (ref 8.5–10.1)
CHLORIDE SERPL-SCNC: 97 MMOL/L (ref 100–111)
CK MB CFR SERPL CALC: 0.6 % (ref 0–4)
CK MB SERPL-MCNC: 2.5 NG/ML (ref 5–25)
CK SERPL-CCNC: 400 U/L (ref 39–308)
CO2 SERPL-SCNC: 31 MMOL/L (ref 21–32)
CREAT SERPL-MCNC: 1 MG/DL (ref 0.6–1.3)
DIFFERENTIAL METHOD BLD: ABNORMAL
EOSINOPHIL # BLD: 0 K/UL (ref 0–0.4)
EOSINOPHIL NFR BLD: 0 % (ref 0–5)
ERYTHROCYTE [DISTWIDTH] IN BLOOD BY AUTOMATED COUNT: 14.8 % (ref 11.6–14.5)
GLOBULIN SER CALC-MCNC: 4.7 G/DL (ref 2–4)
GLUCOSE SERPL-MCNC: 115 MG/DL (ref 74–99)
HCT VFR BLD AUTO: 42 % (ref 36–48)
HGB BLD-MCNC: 13.7 G/DL (ref 13–16)
LYMPHOCYTES # BLD: 1.3 K/UL (ref 0.9–3.6)
LYMPHOCYTES NFR BLD: 33 % (ref 21–52)
MCH RBC QN AUTO: 25.3 PG (ref 24–34)
MCHC RBC AUTO-ENTMCNC: 32.6 G/DL (ref 31–37)
MCV RBC AUTO: 77.6 FL (ref 74–97)
MONOCYTES # BLD: 0.6 K/UL (ref 0.05–1.2)
MONOCYTES NFR BLD: 17 % (ref 3–10)
NEUTS SEG # BLD: 1.9 K/UL (ref 1.8–8)
NEUTS SEG NFR BLD: 50 % (ref 40–73)
PLATELET # BLD AUTO: 171 K/UL (ref 135–420)
PMV BLD AUTO: 9.4 FL (ref 9.2–11.8)
POTASSIUM SERPL-SCNC: 3.9 MMOL/L (ref 3.5–5.5)
PROT SERPL-MCNC: 7.8 G/DL (ref 6.4–8.2)
RBC # BLD AUTO: 5.41 M/UL (ref 4.7–5.5)
SODIUM SERPL-SCNC: 132 MMOL/L (ref 136–145)
TROPONIN I SERPL-MCNC: <0.02 NG/ML (ref 0–0.04)
WBC # BLD AUTO: 3.8 K/UL (ref 4.6–13.2)

## 2020-08-19 PROCEDURE — 99283 EMERGENCY DEPT VISIT LOW MDM: CPT

## 2020-08-19 PROCEDURE — 83880 ASSAY OF NATRIURETIC PEPTIDE: CPT

## 2020-08-19 PROCEDURE — 80053 COMPREHEN METABOLIC PANEL: CPT

## 2020-08-19 PROCEDURE — 85025 COMPLETE CBC W/AUTO DIFF WBC: CPT

## 2020-08-19 PROCEDURE — 74011250637 HC RX REV CODE- 250/637: Performed by: NURSE PRACTITIONER

## 2020-08-19 PROCEDURE — 87635 SARS-COV-2 COVID-19 AMP PRB: CPT

## 2020-08-19 PROCEDURE — 71045 X-RAY EXAM CHEST 1 VIEW: CPT

## 2020-08-19 PROCEDURE — 74011250636 HC RX REV CODE- 250/636: Performed by: NURSE PRACTITIONER

## 2020-08-19 PROCEDURE — 82550 ASSAY OF CK (CPK): CPT

## 2020-08-19 PROCEDURE — 93005 ELECTROCARDIOGRAM TRACING: CPT

## 2020-08-19 RX ORDER — DOXYCYCLINE 100 MG/1
100 CAPSULE ORAL 2 TIMES DAILY
COMMUNITY
End: 2020-11-30

## 2020-08-19 RX ORDER — AZITHROMYCIN 250 MG/1
1000 TABLET, FILM COATED ORAL
Status: COMPLETED | OUTPATIENT
Start: 2020-08-19 | End: 2020-08-19

## 2020-08-19 RX ORDER — MONTELUKAST SODIUM 10 MG/1
10 TABLET ORAL DAILY
Qty: 30 TAB | Refills: 0 | Status: SHIPPED | OUTPATIENT
Start: 2020-08-19 | End: 2020-11-30

## 2020-08-19 RX ORDER — DOXYCYCLINE HYCLATE 100 MG
100 TABLET ORAL 2 TIMES DAILY
Qty: 20 TAB | Refills: 0 | Status: SHIPPED | OUTPATIENT
Start: 2020-08-19 | End: 2020-08-29

## 2020-08-19 RX ORDER — ALBUTEROL SULFATE 90 UG/1
2 AEROSOL, METERED RESPIRATORY (INHALATION)
Qty: 1 INHALER | Refills: 0 | Status: SHIPPED | OUTPATIENT
Start: 2020-08-19 | End: 2020-11-30

## 2020-08-19 RX ORDER — METRONIDAZOLE 500 MG/1
500 TABLET ORAL 2 TIMES DAILY
Qty: 14 TAB | Refills: 0 | Status: SHIPPED | OUTPATIENT
Start: 2020-08-19 | End: 2020-08-26

## 2020-08-19 RX ORDER — BENZONATATE 100 MG/1
100 CAPSULE ORAL
Qty: 21 CAP | Refills: 0 | Status: SHIPPED | OUTPATIENT
Start: 2020-08-19 | End: 2020-08-26

## 2020-08-19 RX ORDER — PANTOPRAZOLE SODIUM 40 MG/1
40 TABLET, DELAYED RELEASE ORAL DAILY
Qty: 20 TAB | Refills: 0 | Status: SHIPPED | OUTPATIENT
Start: 2020-08-19 | End: 2020-09-08

## 2020-08-19 RX ORDER — PREDNISONE 20 MG/1
20 TABLET ORAL DAILY
Qty: 7 TAB | Refills: 0 | Status: SHIPPED | OUTPATIENT
Start: 2020-08-19 | End: 2020-08-26

## 2020-08-19 RX ADMIN — AZITHROMYCIN MONOHYDRATE 1000 MG: 250 TABLET ORAL at 16:04

## 2020-08-19 RX ADMIN — SODIUM CHLORIDE 1000 ML: 900 INJECTION, SOLUTION INTRAVENOUS at 15:37

## 2020-08-19 NOTE — ED PROVIDER NOTES
EMERGENCY DEPARTMENT HISTORY AND PHYSICAL EXAM    Date: 8/19/2020  Patient Name: Harry Zapien    History of Presenting Illness     Chief Complaint   Patient presents with    Cough    Shortness of Breath       History Provided By: Patient    Chief complaint: 43-year-old obese male with past medical history significant for obstructive sleep apneasevere, depression, schizoaffective disorder, and history of hep C who presented to the ED with chief complaint of productive cough of thick brown sputum, intermittent fever with T-max 100.7. [facility]. Diagnosed with pneumonia and prescribed antibiotic. He states he was not COVID tested and was requesting COVID testing. States now with shortness of breath, burning non radiating chest discomfort ongoing x2 days. He denies any blood in sputum. He states production has been brown but not bloody, no blood tinge. No hemoptysis. No history of DVT/PE. No prolonged immobilization. No malignancy. No other associated symptoms. No fevers or chills. No nausea, vomiting, diarrhea, constipation. No abdominal pain. No pelvic pain. No peripheral edema. PCP: Preston Rodriguez MD    Current Outpatient Medications   Medication Sig Dispense Refill    doxycycline (MONODOX) 100 mg capsule Take 100 mg by mouth two (2) times a day.  predniSONE (DELTASONE) 20 mg tablet Take 20 mg by mouth daily for 7 days. With Breakfast 7 Tab 0    montelukast (Singulair) 10 mg tablet Take 1 Tab by mouth daily. 30 Tab 0    albuterol (PROVENTIL HFA, VENTOLIN HFA, PROAIR HFA) 90 mcg/actuation inhaler Take 2 Puffs by inhalation every six (6) hours as needed for Wheezing or Shortness of Breath. 1 Inhaler 0    pantoprazole (Protonix) 40 mg tablet Take 1 Tab by mouth daily for 20 days. 20 Tab 0    benzonatate (Tessalon Perles) 100 mg capsule Take 1 Cap by mouth three (3) times daily as needed for Cough for up to 7 days.  21 Cap 0    metroNIDAZOLE (FlagyL) 500 mg tablet Take 1 Tab by mouth two (2) times a day for 7 days. 14 Tab 0    doxycycline (VIBRA-TABS) 100 mg tablet Take 1 Tab by mouth two (2) times a day for 10 days. 20 Tab 0    metoprolol succinate (TOPROL-XL) 50 mg XL tablet Take 1 Tab by mouth daily. 30 Tab 6    QUEtiapine (SEROQUEL) 50 mg tablet Take 50 mg by mouth nightly.  methadone (METHADONE INTENSOL) 10 mg/mL solution Take 60 mg by mouth daily. Past History     Past Medical History:  Past Medical History:   Diagnosis Date    Anxiety 10/11/2016    At risk of diabetes mellitus 12/18/2019    Continuous nicotine dependence 10/11/2016    Depression     Headache     Hep C w/o coma, chronic ([address] 10/11/2016    Hepatitis C     Insomnia 10/11/2016    Obesity, Class II, BMI 35-39.9 10/11/2016    [facility]     Psychotic disorder ([facility]     Schizophrenia ([facility] 10/11/2016       Past Surgical History:  Past Surgical History:   Procedure Laterality Date    COLONOSCOPY N/A 3/30/2017    COLONOSCOPY performed by Abraham Elizondo MD at [facility]COPY    HX APPENDECTOMY  2000       Family History:  Family History   Problem Relation Age of Onset    Cancer Mother 36        colon    Cancer Maternal Aunt         breast    Diabetes Father        Social History:  Social History     Tobacco Use    Smoking status: Light Tobacco Smoker     Packs/day: 0.50     Years: 4.00     Pack years: 2.00    Smokeless tobacco: Never Used   Substance Use Topics    Alcohol use: Yes     Comment: 6 pack/week    Drug use: No     Types: Heroin     Comment: [facility]: [facility]       Allergies: Allergies   Allergen Reactions    Pcn [Penicillins] Hives         Review of Systems       Review of Systems   Constitutional: Positive for chills and fever. Negative for diaphoresis and fatigue. HENT: Negative for congestion, ear pain, mouth sores, sinus pressure, sneezing and sore throat.     Eyes: Negative for photophobia, pain and visual disturbance. Respiratory: Positive for cough and shortness of breath. Negative for chest tightness and wheezing. Cardiovascular: Positive for chest pain. Negative for palpitations and leg swelling. Gastrointestinal: Negative for abdominal pain, diarrhea, nausea and vomiting. Genitourinary: Negative for dysuria, flank pain and hematuria. Musculoskeletal: Negative for back pain, gait problem, neck pain and neck stiffness. Skin: Negative for rash and wound. Neurological: Positive for headaches. Negative for dizziness, weakness and light-headedness. Physical Exam     Visit Vitals  /88 (BP 1 Location: Left arm, BP Patient Position: At rest)   Pulse 98   Temp 99.5 °F (37.5 °C)   Resp 20   Ht 5' 10\" (1.778 m)   Wt 124.7 kg (275 lb)   SpO2 94%   BMI 39.46 kg/m²         Physical Exam  Vitals signs and nursing note reviewed. Constitutional:       General: He is not in acute distress. Appearance: Normal appearance. He is not ill-appearing, toxic-appearing or diaphoretic. HENT:      Head: Normocephalic and atraumatic. Neck:      Musculoskeletal: Normal range of motion and neck supple. No neck rigidity or muscular tenderness. Cardiovascular:      Rate and Rhythm: Normal rate and regular rhythm. Pulses: Normal pulses. Heart sounds: Normal heart sounds. No murmur. Pulmonary:      Effort: Pulmonary effort is normal. No respiratory distress. Breath sounds: Normal breath sounds. No wheezing or rales. Abdominal:      General: Abdomen is flat. Bowel sounds are normal. There is no distension. Palpations: Abdomen is soft. Tenderness: There is no abdominal tenderness. There is no right CVA tenderness or left CVA tenderness. Musculoskeletal: Normal range of motion. Right lower leg: No edema. Left lower leg: No edema. Skin:     General: Skin is warm and dry. Capillary Refill: Capillary refill takes less than 2 seconds. Neurological:      General: No focal deficit present. Mental Status: He is alert and oriented to person, place, and time. Psychiatric:         Behavior: Behavior normal.       Diagnostic Study Results     Labs -  Recent Results (from the past 12 hour(s))   CBC WITH AUTOMATED DIFF    Collection Time: 08/19/20  1:57 PM   Result Value Ref Range    WBC 3.8 (L) 4.6 - 13.2 K/uL    RBC 5.41 4.70 - 5.50 M/uL    HGB 13.7 13.0 - 16.0 g/dL    HCT 42.0 36.0 - 48.0 %    MCV 77.6 74.0 - 97.0 FL    MCH 25.3 24.0 - 34.0 PG    MCHC 32.6 31.0 - 37.0 g/dL    RDW 14.8 (H) 11.6 - 14.5 %    PLATELET 304 424 - 828 K/uL    MPV 9.4 9.2 - 11.8 FL    NEUTROPHILS 50 40 - 73 %    LYMPHOCYTES 33 21 - 52 %    MONOCYTES 17 (H) 3 - 10 %    EOSINOPHILS 0 0 - 5 %    BASOPHILS 0 0 - 2 %    ABS. NEUTROPHILS 1.9 1.8 - 8.0 K/UL    ABS. LYMPHOCYTES 1.3 0.9 - 3.6 K/UL    ABS. MONOCYTES 0.6 0.05 - 1.2 K/UL    ABS. EOSINOPHILS 0.0 0.0 - 0.4 K/UL    ABS. BASOPHILS 0.0 0.0 - 0.1 K/UL    DF AUTOMATED     METABOLIC PANEL, COMPREHENSIVE    Collection Time: 08/19/20  1:57 PM   Result Value Ref Range    Sodium 132 (L) 136 - 145 mmol/L    Potassium 3.9 3.5 - 5.5 mmol/L    Chloride 97 (L) 100 - 111 mmol/L    CO2 31 21 - 32 mmol/L    Anion gap 4 3.0 - 18 mmol/L    Glucose 115 (H) 74 - 99 mg/dL    BUN 11 7.0 - 18 MG/DL    Creatinine 1.00 0.6 - 1.3 MG/DL    BUN/Creatinine ratio 11 (L) 12 - 20      GFR est AA >60 >60 ml/min/1.73m2    GFR est non-AA >60 >60 ml/min/1.73m2    Calcium 8.0 (L) 8.5 - 10.1 MG/DL    Bilirubin, total 0.3 0.2 - 1.0 MG/DL    ALT (SGPT) 58 16 - 61 U/L    AST (SGOT) 46 (H) 10 - 38 U/L    Alk.  phosphatase 67 45 - 117 U/L    Protein, total 7.8 6.4 - 8.2 g/dL    Albumin 3.1 (L) 3.4 - 5.0 g/dL    Globulin 4.7 (H) 2.0 - 4.0 g/dL    A-G Ratio 0.7 (L) 0.8 - 1.7     NT-PRO BNP    Collection Time: 08/19/20  1:57 PM   Result Value Ref Range    NT pro-BNP 7 0 - 450 PG/ML   CARDIAC PANEL,(CK, CKMB & TROPONIN)    Collection Time: 08/19/20  1:57 PM Result Value Ref Range    CK - MB 2.5 <3.6 ng/ml    CK-MB Index 0.6 0.0 - 4.0 %     (H) 39 - 308 U/L    Troponin-I, QT <0.02 0.0 - 0.045 NG/ML       Radiologic Studies -   XR CHEST PORT   Final Result   IMPRESSION:      No acute findings. Medical Decision Making   I am the first provider for this patient. I reviewed the vital signs, available nursing notes, past medical history, past surgical history, family history and social history. Vital Signs-Reviewed the patient's vital signs. Records Reviewed: Nursing Notes and Old Medical Records (Time of Review: 4:38 PM)    ED Course: Progress Notes, Reevaluation, and Consults:      Provider Notes (Medical Decision Making):     Ddx: COVID, pna, PE, ACS, asthma / COPD, CHF    Vitals stable. ECG with no acute ischemic changes. Chest x-ray with no acute pathology. Cardiac panel with mildly elevated CK but otherwise unremarkable. Chemistry with sodium 132 but otherwise unremarkable. LFTs reassuring. proBNP 7. Low suspicion for PE/DVT per Wells criteria. Suspect symptoms likely related to Luciano given his wife who works at [employer] is in the ED with COVID like complaints. Of note, white's work-up reviewed positive for trichomonas in urine and Wet prep. With wife's consent, these results were discussed with patient and wife at bedside. Both parties agreed to STI treatment for chlamydia/gonorrhea/trichomonas. He has a penicillin allergy therefore received doxy instead. Encourage patient to continue follow-up with pulmonology who he sees for obstructive sleep apnea. Extensive review of return precautions discussed with patient prior to discharge. Discussed importance of PCP follow up / reassessment and need to return to ED immediately should symptoms worsen. Diagnosis     Clinical Impression:   1. Suspected COVID-19 virus infection    2.  Exposure to STD        Disposition: home       Follow-up Information     Follow up With Specialties Details Why [facility] EMERGENCY DEPT Emergency Medicine  If symptoms worsen [address] Lisa Win MD Internal Medicine In 2 days SUSPECTED COVID [address] [id number]  [phone]      [facility] [address], DO Pulmonary Disease, Critical Care Medicine  cough, covid [address]  [address]             Patient's Medications   Start Taking    ALBUTEROL (PROVENTIL HFA, VENTOLIN HFA, PROAIR HFA) 90 MCG/ACTUATION INHALER    Take 2 Puffs by inhalation every six (6) hours as needed for Wheezing or Shortness of Breath. BENZONATATE (TESSALON PERLES) 100 MG CAPSULE    Take 1 Cap by mouth three (3) times daily as needed for Cough for up to 7 days. DOXYCYCLINE (VIBRA-TABS) 100 MG TABLET    Take 1 Tab by mouth two (2) times a day for 10 days. METRONIDAZOLE (FLAGYL) 500 MG TABLET    Take 1 Tab by mouth two (2) times a day for 7 days. MONTELUKAST (SINGULAIR) 10 MG TABLET    Take 1 Tab by mouth daily. PANTOPRAZOLE (PROTONIX) 40 MG TABLET    Take 1 Tab by mouth daily for 20 days. PREDNISONE (DELTASONE) 20 MG TABLET    Take 20 mg by mouth daily for 7 days. With Breakfast   Continue Taking    DOXYCYCLINE (MONODOX) 100 MG CAPSULE    Take 100 mg by mouth two (2) times a day. METHADONE (METHADONE INTENSOL) 10 MG/ML SOLUTION    Take 60 mg by mouth daily. METOPROLOL SUCCINATE (TOPROL-XL) 50 MG XL TABLET    Take 1 Tab by mouth daily. QUETIAPINE (SEROQUEL) 50 MG TABLET    Take 50 mg by mouth nightly. These Medications have changed    No medications on file   Stop Taking    IBUPROFEN (MOTRIN) 600 MG TABLET    Take 1 Tab by mouth every six (6) hours as needed for Pain. Dictation disclaimer:  Please note that this dictation was completed with [employer], the [employer] voice recognition software.   Quite often unanticipated grammatical, syntax, homophones, and other interpretive errors are inadvertently transcribed by the computer software. Please disregard these errors. Please excuse any errors that have escaped final proofreading.

## 2020-08-19 NOTE — ED TRIAGE NOTES
Pt here for evaluation of cough productive of brown/red sputum x 4-5 days. States temp as high as 100.7. Seen at [facility] days ago for same. Diagnosed with pneumonia and was prescribed antibiotic. States he was not tested for covid. States he has an appt for testing this weekend. Reports onset of shortness of breath ~2 days ago.

## 2020-08-19 NOTE — ED NOTES
Current Discharge Medication List      START taking these medications    Details   predniSONE (DELTASONE) 20 mg tablet Take 20 mg by mouth daily for 7 days. With Breakfast  Qty: 7 Tab, Refills: 0      montelukast (Singulair) 10 mg tablet Take 1 Tab by mouth daily. Qty: 30 Tab, Refills: 0      albuterol (PROVENTIL HFA, VENTOLIN HFA, PROAIR HFA) 90 mcg/actuation inhaler Take 2 Puffs by inhalation every six (6) hours as needed for Wheezing or Shortness of Breath. Qty: 1 Inhaler, Refills: 0      pantoprazole (Protonix) 40 mg tablet Take 1 Tab by mouth daily for 20 days. Qty: 20 Tab, Refills: 0      benzonatate (Tessalon Perles) 100 mg capsule Take 1 Cap by mouth three (3) times daily as needed for Cough for up to 7 days. Qty: 21 Cap, Refills: 0      metroNIDAZOLE (FlagyL) 500 mg tablet Take 1 Tab by mouth two (2) times a day for 7 days. Qty: 14 Tab, Refills: 0      doxycycline (VIBRA-TABS) 100 mg tablet Take 1 Tab by mouth two (2) times a day for 10 days. Qty: 20 Tab, Refills: 0           Patient armband removed and shredded. Prescription given and reviewed with patient.

## 2020-08-19 NOTE — DISCHARGE INSTRUCTIONS
HOME ISOLATION PRECAUTIONS DURING COVID-19 OUTBREAK (per CDC guidelines)    1) Stay home except to get medical care:  People who are mildly ill with COVID-19 are able to isolate at home during their illness. You should restrict activities outside your home, except for getting medical care. Do not go to work, school, or public areas. Avoid using public transportation, ride-sharing, or taxis. 2) Separate yourself from other people and animals in your home  People: As much as possible, you should stay in a specific room and away from other people in your home. Also, you should use a separate bathroom, if available. 3) Animals: You should restrict contact with pets and other animals while you are sick with COVID-19, just like you would around other people. Although there have not been reports of pets or other animals becoming sick with COVID-19, it is still recommended that people sick with COVID-19 limit contact with animals until more information is known about the virus. When possible, have another member of your household care for your animals while you are sick. If you are sick with COVID-19, avoid contact with your pet, including petting, snuggling, being kissed or licked, and sharing food. If you must care for your pet or be around animals while you are sick, wash your hands before and after you interact with pets and wear a facemask. See COVID-19 and Animals for more information. 4) Call ahead before visiting your doctor  If you have a medical appointment, call the healthcare provider and tell them that you have or may have COVID-19. This will help the healthcare providers office take steps to keep other people from getting infected or exposed. 5) Wear a facemask  You should wear a facemask when you are around other people (e.g., sharing a room or vehicle) or pets and before you enter a healthcare providers office.  If you are not able to wear a facemask (for example, because it causes trouble breathing), then people who live with you should not stay in the same room with you, or they should wear a facemask if they enter your room. 6) Cover your coughs and sneezes  Cover your mouth and nose with a tissue when you cough or sneeze. Throw used tissues in a lined trash can. Immediately wash your hands with soap and water for at least 20 seconds or, if soap and water are not available, clean your hands with an alcohol-based hand  that contains at least 60% alcohol. 7) Clean your hands often  Wash your hands often with soap and water for at least 20 seconds, especially after blowing your nose, coughing, or sneezing; going to the bathroom; and before eating or preparing food. If soap and water are not readily available, use an alcohol-based hand  with at least 60% alcohol, covering all surfaces of your hands and rubbing them together until they feel dry. 8) Soap and water are the best option if hands are visibly dirty. Avoid touching your eyes, nose, and mouth with unwashed hands. 9) Avoid sharing personal household items  You should not share dishes, drinking glasses, cups, eating utensils, towels, or bedding with other people or pets in your home. After using these items, they should be washed thoroughly with soap and water. 10) Clean all high-touch surfaces everyday  High touch surfaces include counters, tabletops, doorknobs, bathroom fixtures, toilets, phones, keyboards, tablets, and bedside tables. Also, clean any surfaces that may have blood, stool, or body fluids on them. Use a household cleaning spray or wipe, according to the label instructions. Labels contain instructions for safe and effective use of the cleaning product including precautions you should take when applying the product, such as wearing gloves and making sure you have good ventilation during use of the product.     11) Monitor your symptoms  Seek prompt medical attention if your illness is worsening (e.g., difficulty breathing). Before seeking care, call your healthcare provider and tell them that you have, or are being evaluated for, COVID-19. Put on a facemask before you enter the facility. These steps will help the healthcare providers office to keep other people in the office or waiting room from getting infected or exposed. Ask your healthcare provider to call the local or state health department. Persons who are placed under active monitoring or facilitated self-monitoring should follow instructions provided by their local health department or occupational health professionals, as appropriate. When working with your local health department check their available hours. 12) If you have a medical emergency and need to call 911, notify the dispatch personnel that you have, or are being evaluated for COVID-19. If possible, put on a facemask before emergency medical services arrive. 13) Discontinuing home isolation  Patients with confirmed COVID-19 should remain under home isolation precautions until the risk of secondary transmission to others is thought to be low. The decision to discontinue home isolation precautions should be made on a case-by-case basis, in consultation with healthcare providers and [facility] and local health departments. Recommended precautions for household members, intimate partners, and caregivers in a nonhealthcare setting of  A patient with symptomatic laboratory-confirmed COVID-19   or   a patient under investigation  Household members, intimate partners, and caregivers in a nonhealthcare setting may have close contact2 with a person with symptomatic, laboratory-confirmed COVID-19 or a person under investigation.  Close contacts should monitor their health; they should call their healthcare provider right away if they develop symptoms suggestive of COVID-19 (e.g., fever, cough, shortness of breath)     Close contacts should also follow these recommendations:   Make sure that you understand and can help the patient follow their healthcare provider's instructions for medication(s) and care. You should help the patient with basic needs in the home and provide support for getting groceries, prescriptions, and other personal needs.  Monitor the patient's symptoms. If the patient is getting sicker, call his or her healthcare provider and tell them that the patient has laboratory-confirmed COVID-19. This will help the healthcare provider's office take steps to keep other people in the office or waiting room from getting infected. Ask the healthcare provider to call the local or [facility] health department for additional guidance. If the patient has a medical emergency and you need to call 911, notify the dispatch personnel that the patient has, or is being evaluated for COVID-19.   Household members should stay in another room or be  from the patient as much as possible. Household members should use a separate bedroom and bathroom, if available.  Prohibit visitors who do not have an essential need to be in the home.  Make sure that shared spaces in the home have good air flow, such as by an air conditioner or an opened window, weather permitting.  Perform hand hygiene frequently. Wash your hands often with soap and water for at least 20 seconds or use an alcohol-based hand  that contains 60 to 95% alcohol, covering all surfaces of your hands and rubbing them together until they feel dry. Soap and water should be used preferentially if hands are visibly dirty.  You and the patient should wear a facemask if you are in the same room.  Wear a disposable facemask and gloves when you touch or have contact with the patient's blood, stool, or body fluids, such as saliva, sputum, nasal mucus, vomit, urine. o Throw out disposable facemasks and gloves after using them. Do not reuse. o When removing personal protective equipment, first remove and dispose of gloves.  Then, immediately clean your hands with soap and water or alcohol-based hand . Next, remove and dispose of facemask, and immediately clean your hands again with soap and water or alcohol-based hand . [address] thoroughly.  o Immediately remove and wash clothes or bedding that have blood, stool, or body fluids on them.  o Wear disposable gloves while handling soiled items and keep soiled items away from your body. Clean your hands (with soap and water or an alcohol-based hand ) immediately after removing your gloves. o Read and follow directions on labels of laundry or clothing items and detergent. In general, using a normal laundry detergent according to washing machine instructions and dry thoroughly using the warmest temperatures recommended on the clothing label.  Discuss any additional questions with your state or local health department or healthcare provider. Footnotes  2Close contact is defined as--  a) being within approximately 6 feet (2 meters) of a COVID-19 case for a prolonged period of time; close contact can occur while caring for, living with, visiting, or sharing a health care waiting area or room with a COVID-19 case  - or -  b) having direct contact with infectious secretions of a COVID-19 case (e.g., being coughed on).

## 2020-08-20 ENCOUNTER — PATIENT OUTREACH (OUTPATIENT)
Dept: CASE MANAGEMENT | Age: 45
End: 2020-08-20

## 2020-08-20 LAB
ATRIAL RATE: 89 BPM
CALCULATED P AXIS, ECG09: 65 DEGREES
CALCULATED T AXIS, ECG11: -21 DEGREES
DIAGNOSIS, 93000: NORMAL
P-R INTERVAL, ECG05: 140 MS
Q-T INTERVAL, ECG07: 384 MS
QRS DURATION, ECG06: 76 MS
QTC CALCULATION (BEZET), ECG08: 467 MS
VENTRICULAR RATE, ECG03: 89 BPM

## 2020-08-21 ENCOUNTER — PATIENT OUTREACH (OUTPATIENT)
Dept: CASE MANAGEMENT | Age: 45
End: 2020-08-21

## 2020-08-21 LAB — SARS-COV-2, COV2NT: DETECTED

## 2020-08-24 NOTE — PROGRESS NOTES
Contacted patient for Transitions of Care Coordination  follow up. No answer. Left message introducing self, role and reason for call. Requested return call. Contact information provided. Second attempt to contact patient. Episode resolved.

## 2020-08-24 NOTE — PROGRESS NOTES
3:08 PM : patient called. Identity verified. Patient doing well. Denies any SOB, chest pain, or chest discomfort. Results discussed with patient. He verbalizes understanding of CDC guidelines for self quarantine. All questions answered to the best of my abilities, he has no further questions. Extensive review of return precautions discussed with patient prior to discharge. Discussed importance of PCP follow up / reassessment and need to return to ED immediately should symptoms worsen.

## 2020-11-30 ENCOUNTER — APPOINTMENT (OUTPATIENT)
Dept: GENERAL RADIOLOGY | Age: 45
End: 2020-11-30
Attending: EMERGENCY MEDICINE
Payer: MEDICAID

## 2020-11-30 ENCOUNTER — HOSPITAL ENCOUNTER (EMERGENCY)
Age: 45
Discharge: HOME | End: 2020-11-30
Attending: EMERGENCY MEDICINE
Payer: MEDICAID

## 2020-11-30 VITALS
DIASTOLIC BLOOD PRESSURE: 86 MMHG | SYSTOLIC BLOOD PRESSURE: 134 MMHG | WEIGHT: 260 LBS | RESPIRATION RATE: 15 BRPM | OXYGEN SATURATION: 99 % | HEIGHT: 70 IN | HEART RATE: 79 BPM | TEMPERATURE: 98.3 F | BODY MASS INDEX: 37.22 KG/M2

## 2020-11-30 DIAGNOSIS — R07.89 ATYPICAL CHEST PAIN: Primary | ICD-10-CM

## 2020-11-30 DIAGNOSIS — R00.2 PALPITATIONS: ICD-10-CM

## 2020-11-30 DIAGNOSIS — B18.2 HEP C W/O COMA, CHRONIC (HCC): ICD-10-CM

## 2020-11-30 LAB
ALBUMIN SERPL-MCNC: 3.4 G/DL (ref 3.4–5)
ALBUMIN/GLOB SERPL: 0.7 {RATIO} (ref 0.8–1.7)
ALP SERPL-CCNC: 73 U/L (ref 45–117)
ALT SERPL-CCNC: 44 U/L (ref 16–61)
ANION GAP SERPL CALC-SCNC: 4 MMOL/L (ref 3–18)
AST SERPL-CCNC: 40 U/L (ref 10–38)
ATRIAL RATE: 103 BPM
BASOPHILS # BLD: 0 K/UL (ref 0–0.1)
BASOPHILS NFR BLD: 0 % (ref 0–2)
BILIRUB SERPL-MCNC: 0.3 MG/DL (ref 0.2–1)
BUN SERPL-MCNC: 14 MG/DL (ref 7–18)
BUN/CREAT SERPL: 12 (ref 12–20)
CALCIUM SERPL-MCNC: 9.1 MG/DL (ref 8.5–10.1)
CALCULATED P AXIS, ECG09: 65 DEGREES
CALCULATED R AXIS, ECG10: 21 DEGREES
CALCULATED T AXIS, ECG11: 16 DEGREES
CHLORIDE SERPL-SCNC: 96 MMOL/L (ref 100–111)
CK MB CFR SERPL CALC: 1.3 % (ref 0–4)
CK MB CFR SERPL CALC: 1.3 % (ref 0–4)
CK MB SERPL-MCNC: 3.8 NG/ML (ref 5–25)
CK MB SERPL-MCNC: 4.3 NG/ML (ref 5–25)
CK SERPL-CCNC: 295 U/L (ref 39–308)
CK SERPL-CCNC: 324 U/L (ref 39–308)
CO2 SERPL-SCNC: 34 MMOL/L (ref 21–32)
CREAT SERPL-MCNC: 1.19 MG/DL (ref 0.6–1.3)
DIAGNOSIS, 93000: NORMAL
DIFFERENTIAL METHOD BLD: ABNORMAL
EOSINOPHIL # BLD: 0.1 K/UL (ref 0–0.4)
EOSINOPHIL NFR BLD: 1 % (ref 0–5)
ERYTHROCYTE [DISTWIDTH] IN BLOOD BY AUTOMATED COUNT: 14.8 % (ref 11.6–14.5)
GLOBULIN SER CALC-MCNC: 4.9 G/DL (ref 2–4)
GLUCOSE SERPL-MCNC: 106 MG/DL (ref 74–99)
HCT VFR BLD AUTO: 39.9 % (ref 36–48)
HGB BLD-MCNC: 13 G/DL (ref 13–16)
LYMPHOCYTES # BLD: 2.7 K/UL (ref 0.9–3.6)
LYMPHOCYTES NFR BLD: 33 % (ref 21–52)
MCH RBC QN AUTO: 25.9 PG (ref 24–34)
MCHC RBC AUTO-ENTMCNC: 32.6 G/DL (ref 31–37)
MCV RBC AUTO: 79.5 FL (ref 74–97)
MONOCYTES # BLD: 1.1 K/UL (ref 0.05–1.2)
MONOCYTES NFR BLD: 13 % (ref 3–10)
NEUTS SEG # BLD: 4.5 K/UL (ref 1.8–8)
NEUTS SEG NFR BLD: 53 % (ref 40–73)
P-R INTERVAL, ECG05: 146 MS
PLATELET # BLD AUTO: 226 K/UL (ref 135–420)
PMV BLD AUTO: 9.3 FL (ref 9.2–11.8)
POTASSIUM SERPL-SCNC: 2.9 MMOL/L (ref 3.5–5.5)
PROT SERPL-MCNC: 8.3 G/DL (ref 6.4–8.2)
Q-T INTERVAL, ECG07: 382 MS
QRS DURATION, ECG06: 82 MS
QTC CALCULATION (BEZET), ECG08: 500 MS
RBC # BLD AUTO: 5.02 M/UL (ref 4.7–5.5)
SODIUM SERPL-SCNC: 134 MMOL/L (ref 136–145)
TROPONIN I SERPL-MCNC: <0.02 NG/ML (ref 0–0.04)
TROPONIN I SERPL-MCNC: <0.02 NG/ML (ref 0–0.04)
VENTRICULAR RATE, ECG03: 103 BPM
WBC # BLD AUTO: 8.3 K/UL (ref 4.6–13.2)

## 2020-11-30 PROCEDURE — 82550 ASSAY OF CK (CPK): CPT

## 2020-11-30 PROCEDURE — 74011250637 HC RX REV CODE- 250/637: Performed by: EMERGENCY MEDICINE

## 2020-11-30 PROCEDURE — 96374 THER/PROPH/DIAG INJ IV PUSH: CPT

## 2020-11-30 PROCEDURE — 74011000250 HC RX REV CODE- 250: Performed by: EMERGENCY MEDICINE

## 2020-11-30 PROCEDURE — 99285 EMERGENCY DEPT VISIT HI MDM: CPT

## 2020-11-30 PROCEDURE — 93005 ELECTROCARDIOGRAM TRACING: CPT

## 2020-11-30 PROCEDURE — 80053 COMPREHEN METABOLIC PANEL: CPT

## 2020-11-30 PROCEDURE — 85025 COMPLETE CBC W/AUTO DIFF WBC: CPT

## 2020-11-30 PROCEDURE — 71045 X-RAY EXAM CHEST 1 VIEW: CPT

## 2020-11-30 RX ORDER — METOPROLOL TARTRATE 5 MG/5ML
5 INJECTION INTRAVENOUS
Status: COMPLETED | OUTPATIENT
Start: 2020-11-30 | End: 2020-11-30

## 2020-11-30 RX ORDER — POTASSIUM CHLORIDE 20 MEQ/1
40 TABLET, EXTENDED RELEASE ORAL
Status: COMPLETED | OUTPATIENT
Start: 2020-11-30 | End: 2020-11-30

## 2020-11-30 RX ORDER — POTASSIUM CHLORIDE 20 MEQ/1
20 TABLET, EXTENDED RELEASE ORAL 3 TIMES DAILY
Qty: 9 TAB | Refills: 0 | Status: SHIPPED | OUTPATIENT
Start: 2020-11-30 | End: 2020-12-03

## 2020-11-30 RX ADMIN — METOROPROLOL TARTRATE 2.5 MG: 5 INJECTION, SOLUTION INTRAVENOUS at 02:59

## 2020-11-30 RX ADMIN — POTASSIUM CHLORIDE 40 MEQ: 1500 TABLET, EXTENDED RELEASE ORAL at 03:58

## 2020-11-30 NOTE — DISCHARGE INSTRUCTIONS
Patient Education        Cardiac Arrhythmia: Care Instructions  Your Care Instructions     A cardiac arrhythmia is a change in the normal rhythm of the heart. Your heart may beat too fast or too slow or beat with an irregular or skipping rhythm. A change in the heart's rhythm may feel like a really strong heartbeat or a fluttering in your chest. A severe heart rhythm problem can keep the body from getting the blood it needs. This can result in shortness of breath, lightheadedness, and fainting. You may take medicine to treat your condition. Your doctor may recommend a pacemaker or recommend catheter ablation to destroy small parts of the heart that are causing a rhythm problem. Another possible treatment is an implantable cardioverter-defibrillator (ICD). An ICD is a device that gives the heart a shock to return the heart to a normal rhythm. Follow-up care is a key part of your treatment and safety. Be sure to make and go to all appointments, and call your doctor if you are having problems. It's also a good idea to know your test results and keep a list of the medicines you take. How can you care for yourself at home? General care    · Be safe with medicines. Take your medicines exactly as prescribed. Call your doctor if you think you are having a problem with your medicine. You will get more details on the specific medicines your doctor prescribes.     · If you received a pacemaker or an ICD, you will get a fact sheet about it.     · Wear medical alert jewelry that says you have an abnormal heart rhythm. You can buy this at most drugstores. Lifestyle changes    · Eat a heart-healthy diet.     · Stay at a healthy weight. Lose weight if you need to.     · Avoid nicotine, too much alcohol, and illegal drugs (meth, speed, and cocaine). Also, get enough sleep and do not overeat.     · Ask your doctor whether you can take over-the-counter medicines (such as decongestants).  These can make your heart beat fast.     · Talk to your doctor about any limits to activities, such as driving, or tasks where you use power tools or ladders. Activity    · Start light exercise if your doctor says you can. Even a small amount will help you get stronger, have more energy, and manage your stress.     · Get regular exercise. Try for 30 minutes on most days of the week. Ask your doctor what level of exercise is safe for you. If activity is not likely to cause health problems, you probably do not have limits on the type or level of activity that you can do. You may want to walk, swim, bike, or do other activities.     · When you exercise, watch for signs that your heart is working too hard. You are pushing too hard if you cannot talk while you exercise. If you become short of breath or dizzy or have chest pain, sit down and rest.     · Check your pulse daily. Place two fingers on the artery at the palm side of your wrist, in line with your thumb. If your heartbeat seems uneven, talk to your doctor. When should you call for help? Call 911 anytime you think you may need emergency care. For example, call if:    · You have symptoms of sudden heart failure. These may include:  ? Severe trouble breathing. ? A fast or irregular heartbeat. ? Coughing up pink, foamy mucus. ? You passed out.     · You have signs of a stroke. These include:  ? Sudden numbness, paralysis, or weakness in your face, arm, or leg, especially on only one side of your body. ? New problems with walking or balance. ? Sudden vision changes. ? Drooling or slurred speech. ? New problems speaking or understanding simple statements, or feeling confused. ? A sudden, severe headache that is different from past headaches. Call your doctor now or seek immediate medical care if:    · You have new or changed symptoms of heart failure, such as:  ? New or increased shortness of breath. ? New or worse swelling in your legs, ankles, or feet.   ? Sudden weight gain, such as more than 2 to 3 pounds in a day or 5 pounds in a week. (Your doctor may suggest a different range of weight gain.)  ? Feeling dizzy or lightheaded or like you may faint. ? Feeling so tired or weak that you cannot do your usual activities. ? Not sleeping well. Shortness of breath wakes you at night. You need extra pillows to prop yourself up to breathe easier. Watch closely for changes in your health, and be sure to contact your doctor if:    · You do not get better as expected. Where can you learn more? Go to http://www.gray.com/  Enter [id number] in the search box to learn more about \"Cardiac Arrhythmia: Care Instructions. \"  Current as of: December 16, 2019               Content Version: 12.6  © 4972-4449 [employer]. Care instructions adapted under license by [employer] (which disclaims liability or warranty for this information). If you have questions about a medical condition or this instruction, always ask your healthcare professional. [place] [address] any warranty or liability for your use of this information. Patient is to resume using his CPAP machine immediately.

## 2020-11-30 NOTE — ED NOTES
While administering Metoprolol IV over 2 minutes  Heart rate decreased from 103 to 88, Dr Cristin Leung stated to stop after administering 2.5mg IV.

## 2020-11-30 NOTE — ED PROVIDER NOTES
MARQUES is a 70-year-old male who presents to the ER with complaint of having intermittent palpitation and chest pain for the last 24 hours. He states he stopped using his sleep apnea machine recently he noticed that he has been getting mild palpitations. Yesterday he removed a big TV yesterday and developed mild SOB that was followed by palpitation and vague left mid chest chest discomfort tonight. His chest pain and SOB resolved prior to arrival to the ER. He denies a history of having heart problems but have had chest pain in the past with 2 negative stress tests. Last negative stress test was 4 months ago.     Past Medical History:   Diagnosis Date    Anxiety 10/11/2016    At risk of diabetes mellitus 12/18/2019    Continuous nicotine dependence 10/11/2016    Depression     Headache     Hep C w/o coma, chronic ([address] 10/11/2016    Hepatitis C     Insomnia 10/11/2016    Obesity, Class II, BMI 35-39.9 10/11/2016    [facility] [address]     Psychotic disorder ([facility] [address]     Schizophrenia ([facility] [address] 10/11/2016       Past Surgical History:   Procedure Laterality Date    COLONOSCOPY N/A 3/30/2017    [facility]    HX APPENDECTOMY  2000         Family History:   Problem Relation Age of Onset    Cancer Mother 36        colon    Cancer Maternal Aunt         breast    Diabetes Father        Social History     Socioeconomic History    Marital status: LEGALLY      Spouse name: Not on file    Number of children: Not on file    Years of education: Not on file    Highest education level: Not on file   Occupational History    Not on file   Social Needs    Financial resource strain: Not on file    Food insecurity     Worry: Not on file     Inability: Not on file    Transportation needs     Medical: Not on file     Non-medical: Not on file   Tobacco Use    Smoking status: Light Tobacco Smoker     Packs/day: 0.50     Years: 4.00     Pack years: 2.00  Smokeless tobacco: Never Used   Substance and Sexual Activity    Alcohol use: Yes     Comment: 6 pack/week    Drug use: No     Types: Heroin     Comment: [facility]    Sexual activity: Yes     Partners: Female   Lifestyle    Physical activity     Days per week: Not on file     Minutes per session: Not on file    Stress: Not on file   Relationships    Social connections     Talks on phone: Not on file     Gets together: Not on file     Attends Gnosticist service: Not on file     Active member of club or organization: Not on file     Attends meetings of clubs or organizations: Not on file     Relationship status: Not on file    Intimate partner violence     Fear of current or ex partner: Not on file     Emotionally abused: Not on file     Physically abused: Not on file     Forced sexual activity: Not on file   Other Topics Concern    Not on file   Social History Narrative    Not on file         ALLERGIES: Pcn [penicillins]    Review of Systems   Constitutional: Negative. HENT: Negative. Eyes: Negative. Respiratory: Negative. Cardiovascular: Negative. Gastrointestinal: Negative. Endocrine: Negative. Genitourinary: Negative. Musculoskeletal: Negative. Skin: Negative. Allergic/Immunologic: Negative. Neurological: Negative. Hematological: Negative. Psychiatric/Behavioral: Negative. All other systems reviewed and are negative. Vitals:    11/30/20 0134   BP: (!) 161/98   Pulse: 98   Resp: 17   SpO2: 99%   Weight: 117.9 kg (260 lb)   Height: 5' 10\" (1.778 m)            Physical Exam  Vitals signs and nursing note reviewed. Constitutional:       General: He is not in acute distress. Appearance: He is well-developed. HENT:      Head: Normocephalic. Eyes:      Conjunctiva/sclera: Conjunctivae normal.      Pupils: Pupils are equal, round, and reactive to light. Neck:      Musculoskeletal: Normal range of motion and neck supple. Cardiovascular:      Rate and Rhythm: Normal rate and regular rhythm. Heart sounds: Normal heart sounds. No murmur. Pulmonary:      Effort: Pulmonary effort is normal. No respiratory distress. Breath sounds: Normal breath sounds. No wheezing or rales. Chest:      Chest wall: No tenderness. Abdominal:      General: Bowel sounds are normal. There is no distension. Palpations: Abdomen is soft. Tenderness: There is no abdominal tenderness. There is no rebound. Musculoskeletal: Normal range of motion. General: No tenderness. Skin:     General: Skin is warm and dry. Findings: No rash. Neurological:      Mental Status: He is alert and oriented to person, place, and time. Cranial Nerves: No cranial nerve deficit. Motor: No abnormal muscle tone. Coordination: Coordination normal.   Psychiatric:         Behavior: Behavior normal.         Thought Content: Thought content normal.         Judgment: Judgment normal.          MDM       Procedures     Hospital course: patient palpitations was treated with lopressor and it resolved. Patient hypokalemia was treated. Re-access of patient prior to discharge, he was asymptomatic and states he is ready for discharge. Dx: chest pain, hypokalemia    Disp: D/C - home. Set up stress test.  F/U with cardiologist in 2 days. Return to ER prn. Resume using CPAP machine. Dictation disclaimer:  Please note that this dictation was completed with [employer], the computer voice recognition software. Quite often unanticipated grammatical, syntax, homophones, and other interpretive errors are inadvertently transcribed by the computer software. Please disregard these errors. Please excuse any errors that have escaped final proofreading.

## 2021-01-10 ENCOUNTER — APPOINTMENT (OUTPATIENT)
Dept: GENERAL RADIOLOGY | Age: 46
End: 2021-01-10
Attending: EMERGENCY MEDICINE
Payer: MEDICAID

## 2021-01-10 ENCOUNTER — HOSPITAL ENCOUNTER (EMERGENCY)
Age: 46
Discharge: HOME | End: 2021-01-10
Attending: EMERGENCY MEDICINE
Payer: MEDICAID

## 2021-01-10 VITALS
WEIGHT: 265 LBS | TEMPERATURE: 97.4 F | RESPIRATION RATE: 18 BRPM | HEART RATE: 97 BPM | BODY MASS INDEX: 37.94 KG/M2 | OXYGEN SATURATION: 97 % | SYSTOLIC BLOOD PRESSURE: 135 MMHG | HEIGHT: 70 IN | DIASTOLIC BLOOD PRESSURE: 82 MMHG

## 2021-01-10 DIAGNOSIS — F19.10 POLYSUBSTANCE ABUSE (HCC): ICD-10-CM

## 2021-01-10 DIAGNOSIS — M25.562 MEDIAL KNEE PAIN, LEFT: ICD-10-CM

## 2021-01-10 DIAGNOSIS — R00.2 PALPITATIONS: Primary | ICD-10-CM

## 2021-01-10 LAB
AMPHET UR QL SCN: NEGATIVE
ANION GAP SERPL CALC-SCNC: 6 MMOL/L (ref 3–18)
ATRIAL RATE: 97 BPM
BARBITURATES UR QL SCN: NEGATIVE
BASOPHILS # BLD: 0 K/UL (ref 0–0.1)
BASOPHILS NFR BLD: 0 % (ref 0–2)
BENZODIAZ UR QL: NEGATIVE
BUN SERPL-MCNC: 9 MG/DL (ref 7–18)
BUN/CREAT SERPL: 8 (ref 12–20)
CALCIUM SERPL-MCNC: 8.9 MG/DL (ref 8.5–10.1)
CALCULATED P AXIS, ECG09: 51 DEGREES
CALCULATED R AXIS, ECG10: 8 DEGREES
CALCULATED T AXIS, ECG11: 21 DEGREES
CANNABINOIDS UR QL SCN: NEGATIVE
CHLORIDE SERPL-SCNC: 98 MMOL/L (ref 100–111)
CO2 SERPL-SCNC: 33 MMOL/L (ref 21–32)
COCAINE UR QL SCN: POSITIVE
CREAT SERPL-MCNC: 1.08 MG/DL (ref 0.6–1.3)
DIAGNOSIS, 93000: NORMAL
DIFFERENTIAL METHOD BLD: ABNORMAL
EOSINOPHIL # BLD: 0.1 K/UL (ref 0–0.4)
EOSINOPHIL NFR BLD: 1 % (ref 0–5)
ERYTHROCYTE [DISTWIDTH] IN BLOOD BY AUTOMATED COUNT: 14.3 % (ref 11.6–14.5)
GLUCOSE SERPL-MCNC: 98 MG/DL (ref 74–99)
HCT VFR BLD AUTO: 40.8 % (ref 36–48)
HDSCOM,HDSCOM: ABNORMAL
HGB BLD-MCNC: 13.3 G/DL (ref 13–16)
LYMPHOCYTES # BLD: 2.8 K/UL (ref 0.9–3.6)
LYMPHOCYTES NFR BLD: 37 % (ref 21–52)
MAGNESIUM SERPL-MCNC: 2.2 MG/DL (ref 1.6–2.6)
MCH RBC QN AUTO: 26.2 PG (ref 24–34)
MCHC RBC AUTO-ENTMCNC: 32.6 G/DL (ref 31–37)
MCV RBC AUTO: 80.3 FL (ref 74–97)
METHADONE UR QL: NEGATIVE
MONOCYTES # BLD: 0.8 K/UL (ref 0.05–1.2)
MONOCYTES NFR BLD: 10 % (ref 3–10)
NEUTS SEG # BLD: 3.8 K/UL (ref 1.8–8)
NEUTS SEG NFR BLD: 52 % (ref 40–73)
OPIATES UR QL: POSITIVE
P-R INTERVAL, ECG05: 146 MS
PCP UR QL: NEGATIVE
PLATELET # BLD AUTO: 246 K/UL (ref 135–420)
PMV BLD AUTO: 9.1 FL (ref 9.2–11.8)
POTASSIUM SERPL-SCNC: 3.5 MMOL/L (ref 3.5–5.5)
Q-T INTERVAL, ECG07: 386 MS
QRS DURATION, ECG06: 84 MS
QTC CALCULATION (BEZET), ECG08: 490 MS
RBC # BLD AUTO: 5.08 M/UL (ref 4.7–5.5)
SODIUM SERPL-SCNC: 137 MMOL/L (ref 136–145)
TSH SERPL DL<=0.05 MIU/L-ACNC: 0.9 UIU/ML (ref 0.36–3.74)
VENTRICULAR RATE, ECG03: 97 BPM
WBC # BLD AUTO: 7.5 K/UL (ref 4.6–13.2)

## 2021-01-10 PROCEDURE — 93005 ELECTROCARDIOGRAM TRACING: CPT

## 2021-01-10 PROCEDURE — 80048 BASIC METABOLIC PNL TOTAL CA: CPT

## 2021-01-10 PROCEDURE — 99284 EMERGENCY DEPT VISIT MOD MDM: CPT

## 2021-01-10 PROCEDURE — 74011250636 HC RX REV CODE- 250/636: Performed by: EMERGENCY MEDICINE

## 2021-01-10 PROCEDURE — 96374 THER/PROPH/DIAG INJ IV PUSH: CPT

## 2021-01-10 PROCEDURE — 84443 ASSAY THYROID STIM HORMONE: CPT

## 2021-01-10 PROCEDURE — 73562 X-RAY EXAM OF KNEE 3: CPT

## 2021-01-10 PROCEDURE — 87635 SARS-COV-2 COVID-19 AMP PRB: CPT

## 2021-01-10 PROCEDURE — 83735 ASSAY OF MAGNESIUM: CPT

## 2021-01-10 PROCEDURE — 80307 DRUG TEST PRSMV CHEM ANLYZR: CPT

## 2021-01-10 PROCEDURE — 85025 COMPLETE CBC W/AUTO DIFF WBC: CPT

## 2021-01-10 RX ORDER — KETOROLAC TROMETHAMINE 15 MG/ML
15 INJECTION, SOLUTION INTRAMUSCULAR; INTRAVENOUS
Status: COMPLETED | OUTPATIENT
Start: 2021-01-10 | End: 2021-01-10

## 2021-01-10 RX ORDER — IBUPROFEN 200 MG
TABLET ORAL
COMMUNITY
Start: 2020-12-15 | End: 2021-01-28

## 2021-01-10 RX ORDER — ESCITALOPRAM OXALATE 10 MG/1
TABLET ORAL
COMMUNITY
Start: 2020-12-15 | End: 2021-09-28

## 2021-01-10 RX ADMIN — KETOROLAC TROMETHAMINE 15 MG: 15 INJECTION, SOLUTION INTRAMUSCULAR; INTRAVENOUS at 16:14

## 2021-01-10 NOTE — ED PROVIDER NOTES
EMERGENCY DEPARTMENT HISTORY AND PHYSICAL EXAM    2:49 PM      Date: 1/10/2021  Patient Name: Марина Hernandez    History of Presenting Illness     Chief Complaint   Patient presents with    Palpitations    Knee Swelling    Arm Pain         History Provided By: Patient    Additional History (Context): Марина Hernandez is a 39 y.o. male with Past medical history of depression, insomnia, psychiatric disorder, who presents with chief complaint of palpitations for the past 2 weeks intermittently. He also complains of some left anterior knee pain for about a month. He states that he is also head some very minimal pain in his right elbow. He denies any injury. States that he was in a car and had his knee flexed a certain way and the pain started after that. He denies any calf pain, shortness of breath, chest pain, cough, dizziness, numbness, weakness, abdominal pain, flank pain, fever, redness, no sick contacts, with and no other complaints. PCP: No primary care provider on file.         Past History     Past Medical History:  Past Medical History:   Diagnosis Date    Anxiety 10/11/2016    At risk of diabetes mellitus 12/18/2019    Continuous nicotine dependence 10/11/2016    Depression     Headache     Hep C w/o coma, chronic ([facility] [address] 10/11/2016    Hepatitis C     Insomnia 10/11/2016    Obesity, Class II, BMI 35-39.9 10/11/2016    Paranoid schizophrenia ([facility] [address] [facility]     Psychotic disorder ([facility] [address]     Schizophrenia ([facility] [address] 10/11/2016       Past Surgical History:  Past Surgical History:   Procedure Laterality Date    COLONOSCOPY N/A 3/30/2017    COL[facility] performed by [facility] at [facility]OSCOPY    HX APPENDECTOMY  2000       Family History:  Family History   Problem Relation Age of Onset    Cancer Mother 36        colon    Cancer Maternal Aunt         breast    Diabetes Father        Social History:  Social History     Tobacco Use    Smoking status: Light Tobacco Smoker     Packs/day: 0.50     Years: 4.00     Pack years: 2.00    Smokeless tobacco: Never Used   Substance Use Topics    Alcohol use: Yes     Comment: 6 pack/week    Drug use: No     Types: Heroin     Comment: [facility]in: last time [facility], [facility]       Allergies: Allergies   Allergen Reactions    Pcn [Penicillins] Hives         Review of Systems       Review of Systems   Constitutional: Negative for chills and fever. HENT: Negative for congestion, rhinorrhea, sore throat and trouble swallowing. Eyes: Negative for visual disturbance. Respiratory: Negative for cough, shortness of breath and wheezing. Cardiovascular: Positive for palpitations. Negative for chest pain and leg swelling. Gastrointestinal: Negative for abdominal pain, nausea and vomiting. Endocrine: Negative for polyuria. Genitourinary: Negative for difficulty urinating, dysuria and flank pain. Musculoskeletal: Negative for arthralgias, back pain, myalgias, neck pain and neck stiffness. Left knee pain   Skin: Negative for rash. Neurological: Negative for dizziness, weakness, numbness and headaches. Hematological: Does not bruise/bleed easily. Psychiatric/Behavioral: Negative for confusion and dysphoric mood. All other systems reviewed and are negative. Physical Exam     Visit Vitals  /82 (BP Patient Position: Supine; Head of bed elevated (Comment degrees))   Pulse 97   Temp 97.4 °F (36.3 °C)   Resp 18   Ht 5' 10\" (1.778 m)   Wt 120.2 kg (265 lb)   SpO2 97%   BMI 38.02 kg/m²         Physical Exam  Vitals signs and nursing note reviewed. Constitutional:       General: He is not in acute distress. Appearance: He is well-developed. He is not ill-appearing or diaphoretic. HENT:      Head: Normocephalic and atraumatic. Eyes:      General: No scleral icterus. Conjunctiva/sclera: Conjunctivae normal.      Pupils: Pupils are equal, round, and reactive to light. Neck:      Musculoskeletal: Normal range of motion and neck supple. Cardiovascular:      Rate and Rhythm: Normal rate. Pulses: Normal pulses. Pulmonary:      Effort: Pulmonary effort is normal. No respiratory distress. Breath sounds: Normal breath sounds. No wheezing. Abdominal:      General: Bowel sounds are normal. There is no distension. Palpations: Abdomen is soft. Tenderness: There is no abdominal tenderness. Musculoskeletal: Normal range of motion. General: Tenderness present. No swelling, deformity or signs of injury (Has tenderness over the medial side of the left knee and mild tenderness of the kneecap, no crepitance, no swelling noted, and no erythema). Right lower leg: No edema. Left lower leg: No edema. Comments: No calf or popliteal tenderness to palpation  Has full range of motion bilateral upper and lower extremities with normal strength and symmetric    No other musculoskeletal tenderness    Distal pulses normal with normal capillary refill    Median, ulnar, radial nerves are intact       Skin:     General: Skin is warm and dry. Coloration: Skin is not jaundiced or pale. Findings: No bruising or erythema. Neurological:      General: No focal deficit present. Mental Status: He is alert and oriented to person, place, and time. Cranial Nerves: No cranial nerve deficit. Sensory: No sensory deficit. Motor: No weakness.       Coordination: Coordination normal.      Deep Tendon Reflexes: Reflexes normal.   Psychiatric:         Mood and Affect: Mood normal.           Diagnostic Study Results     Labs -  Recent Results (from the past 12 hour(s))   EKG, 12 LEAD, INITIAL    Collection Time: 01/10/21  2:34 PM   Result Value Ref Range    Ventricular Rate 97 BPM    Atrial Rate 97 BPM    P-R Interval 146 ms    QRS Duration 84 ms    Q-T Interval 386 ms    QTC Calculation (Bezet) 490 ms    Calculated P Axis 51 degrees    Calculated R Axis 8 degrees    Calculated T Axis 21 degrees    Diagnosis Normal sinus rhythm  Nonspecific T wave abnormality  Prolonged QT  Abnormal ECG  When compared with ECG of 30-NOV-2020 01:30,  No significant change was found  Confirmed by Puma Palomo MD, [place] ([id number]) on 1/10/2021 4:40:57 PM     CBC WITH AUTOMATED DIFF    Collection Time: 01/10/21  2:36 PM   Result Value Ref Range    WBC 7.5 4.6 - 13.2 K/uL    RBC 5.08 4.70 - 5.50 M/uL    HGB 13.3 13.0 - 16.0 g/dL    HCT 40.8 36.0 - 48.0 %    MCV 80.3 74.0 - 97.0 FL    MCH 26.2 24.0 - 34.0 PG    MCHC 32.6 31.0 - 37.0 g/dL    RDW 14.3 11.6 - 14.5 %    PLATELET 199 355 - 682 K/uL    MPV 9.1 (L) 9.2 - 11.8 FL    NEUTROPHILS 52 40 - 73 %    LYMPHOCYTES 37 21 - 52 %    MONOCYTES 10 3 - 10 %    EOSINOPHILS 1 0 - 5 %    BASOPHILS 0 0 - 2 %    ABS. NEUTROPHILS 3.8 1.8 - 8.0 K/UL    ABS. LYMPHOCYTES 2.8 0.9 - 3.6 K/UL    ABS. MONOCYTES 0.8 0.05 - 1.2 K/UL    ABS. EOSINOPHILS 0.1 0.0 - 0.4 K/UL    ABS.  BASOPHILS 0.0 0.0 - 0.1 K/UL    DF AUTOMATED     METABOLIC PANEL, BASIC    Collection Time: 01/10/21  2:36 PM   Result Value Ref Range    Sodium 137 136 - 145 mmol/L    Potassium 3.5 3.5 - 5.5 mmol/L    Chloride 98 (L) 100 - 111 mmol/L    CO2 33 (H) 21 - 32 mmol/L    Anion gap 6 3.0 - 18 mmol/L    Glucose 98 74 - 99 mg/dL    BUN 9 7.0 - 18 MG/DL    Creatinine 1.08 0.6 - 1.3 MG/DL    BUN/Creatinine ratio 8 (L) 12 - 20      GFR est AA >60 >60 ml/min/1.73m2    GFR est non-AA >60 >60 ml/min/1.73m2    Calcium 8.9 8.5 - 10.1 MG/DL   MAGNESIUM    Collection Time: 01/10/21  2:36 PM   Result Value Ref Range    Magnesium 2.2 1.6 - 2.6 mg/dL   TSH 3RD GENERATION    Collection Time: 01/10/21  2:36 PM   Result Value Ref Range    TSH 0.90 0.36 - 3.74 uIU/mL   DRUG SCREEN, URINE    Collection Time: 01/10/21  3:56 PM   Result Value Ref Range    BENZODIAZEPINES Negative NEG      BARBITURATES Negative NEG      THC (TH-CANNABINOL) Negative NEG      OPIATES Positive (A) NEG      PCP(PHENCYCLIDINE) Negative NEG      COCAINE Positive (A) NEG      AMPHETAMINES Negative NEG      METHADONE Negative NEG      HDSCOM (NOTE)        Radiologic Studies -   XR KNEE LT 3 V    (Results Pending)   Per my preliminary read: No fracture, no dislocation, no acute process  Dr. Pascual Mendoza am the first provider for this patient. I reviewed the vital signs, available nursing notes, past medical history, past surgical history, family history and social history. Vital Signs-Reviewed the patient's vital signs. Pulse Oximetry Analysis -  97% on room air (Interpretation) normal    Cardiac Monitor:  Rate: 94  Rhythm:  Normal Sinus Rhythm     EKG: Interpreted by the EP Dr. Stephany Caraballo. Time Interpreted: 1436   Rate: 97   Rhythm: Normal Sinus Rhythm    Interpretation: Normal QRS duration, no ST elevation, no ST depressions,       Records Reviewed: Nursing Notes and Old Medical Records (Time of Review: 2:49 PM)    Provider Notes (Medical Decision Making): DX: Knee strain, arthritis, metabolic, anxiety, musculoskeletal, arrhythmia, drug abuse    Patient with history of being in the methadone clinic    Labs, EKG, x-ray knee, urine drug screen  He states right elbow pain is practically nothing. States he is more concerned with the left anterior knee    Right elbow and forearm benign exam    MDM    Medications   ketorolac (TORADOL) injection 15 mg (15 mg IntraVENous Given 1/10/21 1614)         ED Course: Progress Notes, Reevaluation, and Consults:    Labs are reassuring    Positive opiates and cocaine on drug screen    I explained the risk of using illicit drugs to patient including death. Also the methadone clinic finds him using illicit drugs he will likely be discharged from the practice. Patient states he fully understands and plans to stop using illicit drugs. Patient states he has a new PCP on [facility] he just saw her last month.     Patient at discharge, asking for a Covid test.  He is asymptomatic but states that the methadone clinic will not see him again until he gets a Covid test since he went out of town. Patient states that he has seen cardiologist last year. Have him follow-up with Dr. Noy Riley who saw him last year. We will give him follow-up with Ortho. Discussed with him that we will do a screening Covid test, Covid test is pending. He is asymptomatic and only wants to test to return to his methadone clinic as it is required. I have reassessed the patient. I have discussed the workup, results and plan with the patient and patient is in agreement. Patient is feeling better. Patient was discharge in stable condition. Patient was given outpatient follow up. Patient is to return to emergency department if any new or worsening condition. Diagnosis     Clinical Impression:   1. Palpitations    2. Medial knee pain, left    3. Polysubstance abuse ([facility] [address]        Disposition: Discharged    Follow-up Information     Follow up With Specialties Details Why [address], [address], DO Cardiology Schedule an appointment as soon as possible for a visit in 1 week  [address]      Correne Bamberger, MD Orthopedic Surgery Schedule an appointment as soon as possible for a visit in 3 days  [facility]  [phone]      Your primary care provider  Schedule an appointment as soon as possible for a visit       [facility] EMERGENCY DEPT Emergency Medicine  As needed, If symptoms worsen [address] [facility]  [phone]           Patient's Medications   Start Taking    No medications on file   Continue Taking    ESCITALOPRAM OXALATE (LEXAPRO) 10 MG TABLET    TAKE 1 TABLET BY MOUTH ONCE DAILY AT BEDTIME FOR ANXIETY    METHADONE (METHADONE INTENSOL) 10 MG/ML SOLUTION    Take 40 mg by mouth daily.     NICOTINE (NICODERM CQ) 14 MG/24 HR PATCH    APPLY 1 PATCH TOPICALLY ONCE DAILY DO NOT SMOKE WITH PATCH ON    QUETIAPINE (SEROQUEL) 50 MG TABLET    Take 50 mg by mouth nightly. These Medications have changed    No medications on file   Stop Taking    No medications on file         Silke Cancer, DO    Dragon medical dictation software was used for portions of this report. Unintended transcription errors may occur. My signature above authenticates this document and my orders, the final    diagnosis (es), discharge prescription (s), and instructions in the Epic    record.

## 2021-01-10 NOTE — ED NOTES
Pt advised that U/A is needed. Stated he went before he arrived to ED.  Instructed nurse to leave urinal at bedside

## 2021-01-10 NOTE — ED TRIAGE NOTES
Pt presents with left knee pain and swelling x 1 month, heart palpitation x 2 weeks, and right arm/elbow pain x 1 week. Pt denies shortness of breath or cough, fevers, hx of gout, any known injury to arm or knee. States had similar palpitation event about 1 month ago was told potassium was low as cause.

## 2021-01-11 ENCOUNTER — PATIENT OUTREACH (OUTPATIENT)
Dept: CASE MANAGEMENT | Age: 46
End: 2021-01-11

## 2021-01-11 NOTE — PROGRESS NOTES
Date/Time:  1/11/2021 12:07 PM   Call within 2 business days of discharge: Yes   Attempted to reach patient by telephone. Left HIPPA compliant message requesting a return call. Will attempt to reach patient again.

## 2021-01-12 ENCOUNTER — PATIENT OUTREACH (OUTPATIENT)
Dept: CASE MANAGEMENT | Age: 46
End: 2021-01-12

## 2021-01-12 NOTE — PROGRESS NOTES
Patient contacted regarding recent discharge and COVID-19 risk. Discussed COVID-19 related testing which was pending at this time. Test results were pending. Patient informed of results, if available? Patient informed that he can access test results via I AM AT when available. Outreach made within 2 business days of discharge: Yes    Care Transition Nurse/ Ambulatory Care Manager/ LPN Care Coordinator contacted the patient by telephone to perform post discharge assessment. Verified name and  with patient as identifiers. Patient has following risk factors of: states he and wife recently returned from [place]. CTN/ACM/LPN reviewed discharge instructions, medical action plan and red flags related to discharge diagnosis. Reviewed and educated them on any new and changed medications related to discharge diagnosis. Advised obtaining a 90-day supply of all daily and as-needed medications. Advance Care Planning:   Does patient have an Advance Directive: not on file    Education provided regarding infection prevention, and signs and symptoms of COVID-19 and when to seek medical attention with patient who verbalized understanding. Discussed exposure protocols and quarantine from [address] you at higher risk for severe illness  and given an opportunity for questions and concerns. The patient agrees to contact the COVID-19 hotline [phone] or PCP office for questions related to their healthcare. CTN/ACM/LPN provided contact information for future reference. From CDC: Are you at higher risk for severe illness?  Wash your hands often.  Avoid close contact (6 feet, which is about two arm lengths) with people who are sick.  Put distance between yourself and other people if COVID-19 is spreading in your community.  Clean and disinfect frequently touched surfaces.  Avoid all cruise travel and non-essential air travel.    Call your healthcare professional if you have concerns about COVID-19 and your underlying condition or if you are sick. For more information on steps you can take to protect yourself, see CDC's How to Protect Yourself      Patient/family/caregiver given information for GetWell Loop and agrees to enroll no  Patient's preferred e-mail:  NA  Patient's preferred phone number: NA  Based on Loop alert triggers, patient will be contacted by nurse care manager for worsening symptoms. Plan for follow-up call in 7-14 days based on severity of symptoms and risk factors. Reviewed self-quarantine instructions with patient. Explained to patient self-quarantine is isolating self to one room and using one bathroom, if possible and to avoid contact with family and others for a period of 14 days to avoid spreading illness. Patient verbalizes understanding. States he and wife have been quarantined since return from [place] because \"it is a hot spot\".

## 2021-01-14 LAB — SARS-COV-2, COV2NT: NOT DETECTED

## 2021-01-15 ENCOUNTER — OFFICE VISIT (OUTPATIENT)
Dept: ORTHOPEDIC SURGERY | Age: 46
End: 2021-01-15
Payer: MEDICAID

## 2021-01-15 VITALS
BODY MASS INDEX: 38.08 KG/M2 | OXYGEN SATURATION: 97 % | SYSTOLIC BLOOD PRESSURE: 138 MMHG | WEIGHT: 266 LBS | DIASTOLIC BLOOD PRESSURE: 90 MMHG | HEIGHT: 70 IN | RESPIRATION RATE: 16 BRPM | TEMPERATURE: 97.3 F | HEART RATE: 113 BPM

## 2021-01-15 DIAGNOSIS — M17.12 PRIMARY OSTEOARTHRITIS OF LEFT KNEE: ICD-10-CM

## 2021-01-15 DIAGNOSIS — M25.562 CHRONIC PAIN OF LEFT KNEE: ICD-10-CM

## 2021-01-15 DIAGNOSIS — G89.29 CHRONIC PAIN OF LEFT KNEE: ICD-10-CM

## 2021-01-15 DIAGNOSIS — M22.42 CHONDROMALACIA OF LEFT PATELLA: Primary | ICD-10-CM

## 2021-01-15 PROCEDURE — 20610 DRAIN/INJ JOINT/BURSA W/O US: CPT | Performed by: SPECIALIST

## 2021-01-15 PROCEDURE — 99213 OFFICE O/P EST LOW 20 MIN: CPT | Performed by: SPECIALIST

## 2021-01-15 RX ORDER — BETAMETHASONE SODIUM PHOSPHATE AND BETAMETHASONE ACETATE 3; 3 MG/ML; MG/ML
3 INJECTION, SUSPENSION INTRA-ARTICULAR; INTRALESIONAL; INTRAMUSCULAR; SOFT TISSUE ONCE
Status: COMPLETED | OUTPATIENT
Start: 2021-01-15 | End: 2021-01-15

## 2021-01-15 RX ADMIN — BETAMETHASONE SODIUM PHOSPHATE AND BETAMETHASONE ACETATE 3 MG: 3; 3 INJECTION, SUSPENSION INTRA-ARTICULAR; INTRALESIONAL; INTRAMUSCULAR; SOFT TISSUE at 15:38

## 2021-01-15 NOTE — PROGRESS NOTES
Patient: Rafael Easton                MRN: [id number]       SSN: [id number]  YOB: 1975        AGE: 39 y.o. SEX: male    PCP: Enrico, Not On File  01/15/21    Chief Complaint   Patient presents with    Knee Pain     left knee pain     HISTORY:  Rafael Easton is a 39 y.o. male who is seen for left knee pain. He has been experiencing left knee pain for the past week. He does not recall any injury. He noticed knee tingling and throbbing on 1/4/20. He feels pain with standing, walking and stair climbing. He experiences startup pain after sitting. He has seen Dr. Venessa Manning for right foot pain in the past.  Pain Assessment  1/15/2021   Location of Pain Knee   Location Modifiers Left   Severity of Pain 8   Quality of Pain Throbbing; Sharp   Duration of Pain Persistent   Frequency of Pain Constant   Aggravating Factors Walking   Aggravating Factors Comment weight, turning   Limiting Behavior No   Relieving Factors (No Data)   Relieving Factors Comment stretching, massage   Result of Injury No   Work-Related Injury -   Type of Injury -   Type of Injury Comment -     Occupation, etc:  Mr. [place] No receives social security disability benefits for a learning disability. He previously worked as a  at [employer]. He lives in [place] with his wife. He has a yorkie named Benita. He has 3 sons and 2 daughters. He has 17 grandchildren that keep him busy. He is hypertensive. Mr. [place] No weighs 266 lbs and is 5'10\" tall.        Lab Results   Component Value Date/Time    Hemoglobin A1c 6.6 (H) 04/11/2019 08:31 AM    Hemoglobin A1c (POC) 6.0 12/18/2019 01:49 PM     Weight Metrics 1/15/2021 1/10/2021 11/30/2020 8/19/2020 3/13/2020 2/19/2020 2/10/2020   Weight 266 lb 265 lb 260 lb 275 lb 275 lb 283 lb 9.6 oz 283 lb   BMI 38.17 kg/m2 38.02 kg/m2 37.31 kg/m2 39.46 kg/m2 39.46 kg/m2 40.69 kg/m2 40.61 kg/m2       Patient Active Problem List   Diagnosis Code    Schizophrenia ([facility] [address] F20.9    Continuous nicotine dependence F17.200    Insomnia G47.00    Anxiety F41.9    Hep C w/o coma, chronic (HCC) B18.2    Severe obesity (HCC) E66.01    At risk of diabetes mellitus Z91.89     REVIEW OF SYSTEMS:    Constitutional Symptoms: Negative   Eyes: Negative   Ears, Nose, Throat and Mouth: Negative   Cardiovascular: Negative   Respiratory: Negative   Genitourinary: Per HPI   Gastrointestinal: Per HPI   Integumentary (Skin and/or Breast): Negative   Musculoskeletal: Per HPI   Endocrine/Rheumatologic: Negative   Neurological: Per HPI   Hematology/Lymphatic: Negative    Allergic/Immunologic: Negative   Phychiatric: Negative    Social History     Socioeconomic History    Marital status: LEGALLY      Spouse name: Not on file    Number of children: Not on file    Years of education: Not on file    Highest education level: Not on file   Occupational History    Not on file   Social Needs    Financial resource strain: Not on file    Food insecurity     Worry: Not on file     Inability: Not on file    Transportation needs     Medical: Not on file     Non-medical: Not on file   Tobacco Use    Smoking status: Light Tobacco Smoker     Packs/day: 0.50     Years: 4.00     Pack years: 2.00    Smokeless tobacco: Never Used   Substance and Sexual Activity    Alcohol use: Yes     Comment: 6 pack/week    Drug use: No     Types: Heroin     Comment: [facility]: [facility]    Sexual activity: Yes     Partners: Female   Lifestyle    Physical activity     Days per week: Not on file     Minutes per session: Not on file    Stress: Not on file   Relationships    Social connections     Talks on phone: Not on file     Gets together: Not on file     Attends Anglican service: Not on file     Active member of club or organization: Not on file     Attends meetings of clubs or organizations: Not on file     Relationship status: Not on file    Intimate partner violence     Fear of current or ex partner: Not on file Emotionally abused: Not on file     Physically abused: Not on file     Forced sexual activity: Not on file   Other Topics Concern    Not on file   Social History Narrative    Not on file      Allergies   Allergen Reactions    Pcn [Penicillins] Hives      Current Outpatient Medications   Medication Sig    escitalopram oxalate (LEXAPRO) 10 mg tablet TAKE 1 TABLET BY MOUTH ONCE DAILY AT BEDTIME FOR ANXIETY    nicotine (NICODERM CQ) 14 mg/24 hr patch APPLY 1 PATCH TOPICALLY ONCE DAILY DO NOT SMOKE WITH PATCH ON    QUEtiapine (SEROQUEL) 50 mg tablet Take 50 mg by mouth nightly.  methadone (METHADONE INTENSOL) 10 mg/mL solution Take 40 mg by mouth daily. No current facility-administered medications for this visit.        PHYSICAL EXAMINATION:  Visit Vitals  BP (!) 138/90 (BP 1 Location: Left arm, BP Patient Position: Sitting)   Pulse (!) 113   Temp 97.3 °F (36.3 °C) (Temporal)   Resp 16   Ht 5' 10\" (1.778 m)   Wt 266 lb (120.7 kg)   SpO2 97%   BMI 38.17 kg/m²      ORTHO EXAMINATION:  Examination Right knee Left knee   Skin Intact Intact   Range of motion 120-0 90-0   Effusion - +   Medial joint line tenderness - +   Lateral joint line tenderness - -   Popliteal tenderness - -   Osteophytes palpable - -   Carters - -   Patella crepitus - -   Anterior drawer - -   Lateral laxity - -   Medial laxity - -   Varus deformity - -   Valgus deformity - -   Pretibial edema - 2+   Calf tenderness - -          TIME OUT:  Chart reviewed for the following:   Ritu Cheek MD, have reviewed the History, Physical and updated the Allergic reactions for [address] performed immediately prior to start of procedure:  Ritu Cheek MD, have performed the following reviews on [facility] prior to the start of the procedure:          * Patient was identified by name and date of birth   * Agreement on procedure being performed was verified  * Risks and Benefits explained to the patient  * Procedure site verified and marked as necessary  * Patient was positioned for comfort  * Consent was obtained     Time: 3:25 PM     Date of procedure: 1/15/2021  Procedure performed by:  Manuel Tim MD  Mr. Adama Camacho tolerated the procedure well with no complications. RADIOGRAPHS:  XR LEFT KNEE 1/10/21 [facility]  IMPRESSION:  Minimal DJD at the patellofemoral joint. IMPRESSION:      ICD-10-CM ICD-9-CM    1. Chondromalacia of left patella  M22.42 717.7 betamethasone (CELESTONE) injection 3 mg      DRAIN/INJECT LARGE JOINT/BURSA      REFERRAL TO PHYSICAL THERAPY   2. Chronic pain of left knee  M25.562 719.46 betamethasone (CELESTONE) injection 3 mg    G89.29 338.29 DRAIN/INJECT LARGE JOINT/BURSA      REFERRAL TO PHYSICAL THERAPY   3. Primary osteoarthritis of left knee  M17.12 715.16 betamethasone (CELESTONE) injection 3 mg      DRAIN/INJECT LARGE JOINT/BURSA      REFERRAL TO PHYSICAL THERAPY     PLAN:  He will start a brief course of outpatient physical therapy. After discussing treatment options, patient's left knee was injected with 4 cc Marcaine and 1/2 cc Celestone. There is no need for surgery at this time. He will follow up as needed.       Scribed by Dayna De La Torre ([address]) as dictated by Manuel Tim MD

## 2021-01-20 ENCOUNTER — HOSPITAL ENCOUNTER (OUTPATIENT)
Dept: PHYSICAL THERAPY | Age: 46
Discharge: HOME | End: 2021-01-20
Payer: MEDICAID

## 2021-01-20 PROCEDURE — 97161 PT EVAL LOW COMPLEX 20 MIN: CPT

## 2021-01-20 NOTE — PROGRESS NOTES
[facility]  [address]  [place], [address]                                MSF: [phone] Fax: [phone]              Plan of Care/ Statement of Necessity for Physical Therapy Services      Patient name: Margarita Treviño Start of Care: 2021   Referral source: Valery Luna MD : 1975    Medical Diagnosis: Pain in left knee [M25.562]   Onset Date:Dec 4, 2020    Treatment Diagnosis: left knee pain   Prior Hospitalization: see medical history Provider#: [id number]   Medications: Verified on Patient summary List   Comorbidities/PMHx/Surgical Hx:  [x]? HTN (controlled), osteoarthritis of the knee, CPAP- sleep apnea. Prior level of function: functionally independent, no AD, active lifestyle, has a dog and plays with his grand kids. The Plan of Care and following information is based on the information from the initial evaluation. Assessment/ key information: Patient is a 40 yo male who [facility] with c/o left knee pain. Patient s/s are consistent with left medial meniscal involvement with possible tear. Patient reports pain is 5/10 at best with massage and rest; 9/10 at worst with walking, standing, laying down; pt reports they are unable to sleep through the night secondary to pain. Patient demonstrates decreased ROM, decreased strength, impaired gait, left knee swelling and pain which leads to decrease ease with functional activities and mobility. Patient would benefit from skilled physical therapy to address the above limitations.     Evaluation Complexity History MEDIUM  Complexity : 1-2 comorbidities / personal factors will impact the outcome/ POC ; Examination MEDIUM Complexity : 3 Standardized tests and measures addressing body structure, function, activity limitation and / or participation in recreation  ;Presentation LOW Complexity : Stable, uncomplicated  ;Clinical Decision Making MEDIUM Complexity : FOTO score of 26-74  Overall Complexity Rating: LOW   Problem List: pain affecting function, decrease ROM, decrease strength, edema affecting function, impaired gait/ balance, decrease ADL/ functional abilitiies and decrease activity tolerance   Treatment Plan may include any combination of the following: Therapeutic exercise, Therapeutic activities, Neuromuscular re-education, Physical agent/modality, Gait/balance training, Manual therapy, Patient education, Self Care training, Functional mobility training and Stair training  Patient / Family readiness to learn indicated by: asking questions, trying to perform skills and interest  Persons(s) to be included in education: patient (P)  Barriers to Learning/Limitations: None  Patient Goal (s): for my knee to feel better, would like to be able to walk again  Patient Self Reported Health Status: good  Rehabilitation Potential: good   Short Term Goals: To be accomplished in 2 weeks: 1. Patient will report compliance with HEP at least 1x/day to aid in rehabilitation program.        Status at IE: given at evaluation. Current: Same as IE           2.Patient will demonstrate AROM of left knee flexion to at least 120 degrees to aid in completion of ADLs. Status at IE:108 degrees of left knee flexion with pain. Current: Same as IE        Long Term Goals: To be accomplished in 4 weeks: 1. Patient will increase strength by at least . 5 grade MMT throughout B LEs to aid in completion of ADLs.               Status at [address] [place] [address] (MMT): 5/5 with exceptions indicated below                                            Hip L (1-5) R (1-5)   Hip Flexion 4 P!     Hip Extension tested in s/l due to pain 5-     Hip ABD 4- pain     Hip ADD       Hip ER 4+ pain     Hip IR 4        Knee L (1-5) R (1-5)   Knee Flexion       Knee Extension 4 pain     Ankle PF 2 P knee 4   Ankle DF       Other                Current: Same as IE           2.Patient will report pain less than 1-2/10 average to aid in completion of ADLs. Status at IE: 5-9/10 pain        Current: Same as IE  3. Patient will have at least .5 cm decrease in swelling in the left knee in order to increase ease with left knee flexion ROM to aid in completion of ADLs. Status at IE:Swelling (cm): 2 inches below mid patella: R:42, L:44.5  Mid patella: R:47 L: 48.5  2 inches above mid patella: R:49, L: 51        Current: Same as IE  4. Patient will improve FOTO to 61 points overall to demonstrate improvement in functional ability. Status at IE:40        Current: Same as IE           *FOTO score is an established functional score where 100 = no disability*       Frequency / Duration: Patient to be seen 2 times per week for 6 weeks. Patient/ Caregiver education and instruction: Diagnosis, prognosis, self care, exercises and other ice with elevation 2-3 times a day   [x]  Plan of care has been reviewed with PTA    Certification Period: TIFFANIE Gaspar, PT 1/20/2021 3:21 PM  _____________________________________________________________________  I certify that the above Therapy Services are being furnished while the patient is under my care. I agree with the treatment plan and certify that this therapy is necessary.     Physician's Signature:____________Date:_________TIME:________    [employer], Date and Time must be completed for valid certification **    Please sign and return to[facility] [place] [address]  [place], [address]                                [facility]: [phone] Fax: [phone]

## 2021-01-20 NOTE — PROGRESS NOTES
PT DAILY TREATMENT NOTE/Hip/Knee/Ankle EVAL 10-18    Patient Name: Se Garcia  Date:2021  : 1975  [x]  Patient  Verified  Payor: [facility] MEDICAID / Plan: [facility] [address] / Product Type: Managed Care Medicaid /    In time:2:18  Out time:3:00  Total Treatment Time (min): 42  Visit #: 1 of 12    Treatment Area: Pain in left knee [M25.562]      SUBJECTIVE  Pain Level (0-10 scale): 8/10  [x]constant []intermittent [x]improving []worsening []no change since onset  Any medication changes, allergies to medications, adverse drug reactions, diagnosis change, or new procedure performed?: [x] No    [] Yes (see summary sheet for update)  Subjective functional status/changes:      HPI: Pt reports his left knee started hurting Dec 4th. Pt reports he was driving for a few hours and he had left knee pain and he had to get out and stretch the knee a lot. Pt reports when they got back he just had increased pain and swelling. Pt reports when he goes to get into the car and puts all his weight on the left side it really hurts. Pt reports massaging the left knee helps. Pt reports he got a cortisone shot and it helped for a few days. Pain description:    [x]Constant aching and  throbbing pain and hurts  Limitations to PLOF: unable to step on a stool and put things up, walking on long walks- like shopping no more than 10 minutes, can't stand for longer than 20 minutes. Can't walk his dog right now. difficulty with going to the bathroom due to having to kick the left leg out front. Lifting and holding things are harder. Mechanism of Injury: pt does not know what happened he was just sitting in a car. Current symptoms/Complaints: 5/10 at best with massage; 9/10 at worst with walking, standing, laying down; pt reports they are unable to sleep through the night secondary to pain  Pain since onset: [x]Better []worse  Previous Treatment/Compliance: cortisone injection on Friday.    Comorbidities/PMHx/Surgical Hx:  [x] HTN (controlled), osteoarthritis of the knee, CPAP- sleep apnea. Prior level of function: functionally independent, no AD, active lifestyle, has a dog and plays with his grand kids. Work Hx: disability  Living Situation: lives on the first floor, no stairs. Unable to sit on the toilet comfortably. Step over tub shower. Pt Goals: \"for my knee to feel better, would like to be able to walk again\"  Barriers: []pain []financial []time []transportation []other  Motivation: very motivated  Substance use: [x]Alcohol [x]Tobacco []other:   Cognition: A & O x 3        OBJECTIVE      34 min [x]Eval                  []Re-Eval       8 min Therapeutic Exercise:  [] See flow sheet :   Rationale: increase ROM and increase strength to improve the patients ability to increase patient's ease with performing functional activities and decrease overall pain and symptoms. With   [x] TE   [] TA   [] neuro   [] other: Patient Education: [x] Review HEP    [] Progressed/Changed HEP based on:   [] positioning   [] body mechanics   [] transfers   [] heat/ice application    [] other:        General Evaluation    Gait: pt ambulates with slow antalgic gait pattern  Palpation/Sensation: intact to light touch in bilateral LE's, increased tenderness over left medial joint line and left patellar tendon. ROM:    Degrees. AROM    PROM   Knee Left Right Left Right   Extension Lacking 2 0     Flexion 108 125             Strength (MMT): 5/5 with exceptions indicated below                                            Hip L (1-5) R (1-5)   Hip Flexion 4 P! Hip Extension tested in s/l due to pain 5-    Hip ABD 4- pain    Hip ADD     Hip ER 4+ pain    Hip IR 4      Knee L (1-5) R (1-5)   Knee Flexion     Knee Extension 4 pain    Ankle PF 2 P knee 4   Ankle DF     Other       Functional Mobility        Transfers:       Sit-Stand: left knee kicked out decreased weight shift through left knee. Floor-Stand:        Special Tests:    Knee  Left Right   Varus Stress Test - No laxity, uncomfortable   Valgus Stress Test - No laxity, uncomfortable   Lachman's - -        Carter's - uncomfortable   Apley's test - +   Thessaly - +             Single Leg Squat Unable to complete due to pain Able to perform. Other Tests / Comments: SLS: R: 15 seconds, L: 2 seconds  Swelling (cm): 2 inches below mid patella: R:42, L:44.5  Mid patella: R:47 L: 48.5  2 inches above mid patella: R:49, L: 51         Pain Level (0-10 scale) post treatment: 6/10    ASSESSMENT/Changes in Function: Patient is a 40 yo male who [facility] with c/o left knee pain. Patient s/s are consistent with left medial meniscal involvement with possible tear. Patient reports pain is 5/10 at best with massage and rest; 9/10 at worst with walking, standing, laying down; pt reports they are unable to sleep through the night secondary to pain. Patient demonstrates decreased ROM, decreased strength, impaired gait, left knee swelling and pain which leads to decrease ease with functional activities and mobility. Patient would benefit from skilled physical therapy to address the above limitations. Patient will continue to benefit from skilled PT services to modify and progress therapeutic interventions, address functional mobility deficits, address ROM deficits, address strength deficits, analyze and address soft tissue restrictions, analyze and cue movement patterns, analyze and modify body mechanics/ergonomics and assess and modify postural abnormalities to attain remaining goals. [x]  See Plan of Care  []  See progress note/recertification  []  See Discharge Summary         Progress towards goals / Updated goals:  Short Term Goals: To be accomplished in 2 weeks: 1. Patient will report compliance with HEP at least 1x/day to aid in rehabilitation program.   Status at IE: given at evaluation.     Current: Same as IE     2.Patient will demonstrate AROM of left knee flexion to at least 120 degrees to aid in completion of ADLs. Status at IE:108 degrees of left knee flexion with pain. Current: Same as IE       Long Term Goals: To be accomplished in 4 weeks: 1. Patient will increase strength by at least . 5 grade MMT throughout B LEs to aid in completion of ADLs. Status at [address] [facility] (MMT): 5/5 with exceptions indicated below                                            Hip L (1-5) R (1-5)   Hip Flexion 4 P! Hip Extension tested in s/l due to pain 5-    Hip ABD 4- pain    Hip ADD     Hip ER 4+ pain    Hip IR 4      Knee L (1-5) R (1-5)   Knee Flexion     Knee Extension 4 pain    Ankle PF 2 P knee 4   Ankle DF     Other        Current: Same as IE     2. Patient will report pain less than 1-2/10 average to aid in completion of ADLs. Status at IE: 5-9/10 pain   Current: Same as IE  3. Patient will have at least .5 cm decrease in swelling in the left knee in order to increase ease with left knee flexion ROM to aid in completion of ADLs. Status at IE:Swelling (cm): 2 inches below mid patella: R:42, L:44.5  Mid patella: R:47 L: 48.5  2 inches above mid patella: R:49, L: 51   Current: Same as IE  4. Patient will improve FOTO to 61 points overall to demonstrate improvement in functional ability.    Status at IE:40   Current: Same as IE      *FOTO score is an established functional score where 100 = no disability*    PLAN  [x]  Upgrade activities as tolerated     [x]  Continue plan of care  [x]  Update interventions per flow sheet       []  Discharge due to:_  []  Other:_      Andrew Landin, PT 1/20/2021  2:17 PM

## 2021-01-26 ENCOUNTER — PATIENT OUTREACH (OUTPATIENT)
Dept: CASE MANAGEMENT | Age: 46
End: 2021-01-26

## 2021-01-26 NOTE — PROGRESS NOTES
Date/Time:  1/26/2021 3:48 PM   Call within 2 business days of discharge: No   Attempted to reach patient by telephone. Left HIPPA compliant message requesting a return call. Patient resolved from [address] Transitions episode on 1/26/21    Patient/family has previously been provided the following resources and education related to COVID-19:                         Signs, symptoms and red flags related to COVID-19            CDC exposure and quarantine guidelines            Conduit exposure contact - [phone]            Contact for their local Department of Health                 No further outreach scheduled with this CTN/ACM/LPN/HC/ MA. Episode of Care resolved. Patient has this CTN/ACM/LPN/HC/MA contact information if future needs arise.

## 2021-01-27 ENCOUNTER — APPOINTMENT (OUTPATIENT)
Dept: PHYSICAL THERAPY | Age: 46
End: 2021-01-27
Payer: MEDICAID

## 2021-01-28 ENCOUNTER — APPOINTMENT (OUTPATIENT)
Dept: GENERAL RADIOLOGY | Age: 46
End: 2021-01-28
Attending: EMERGENCY MEDICINE
Payer: MEDICAID

## 2021-01-28 ENCOUNTER — HOSPITAL ENCOUNTER (EMERGENCY)
Age: 46
Discharge: HOME | End: 2021-01-28
Attending: EMERGENCY MEDICINE
Payer: MEDICAID

## 2021-01-28 VITALS
WEIGHT: 265 LBS | RESPIRATION RATE: 20 BRPM | TEMPERATURE: 98.6 F | HEART RATE: 113 BPM | DIASTOLIC BLOOD PRESSURE: 77 MMHG | BODY MASS INDEX: 37.94 KG/M2 | SYSTOLIC BLOOD PRESSURE: 131 MMHG | HEIGHT: 70 IN | OXYGEN SATURATION: 98 %

## 2021-01-28 DIAGNOSIS — S92.515A NONDISPLACED FRACTURE OF PROXIMAL PHALANX OF LEFT LESSER TOE(S), INITIAL ENCOUNTER FOR CLOSED FRACTURE: Primary | ICD-10-CM

## 2021-01-28 PROCEDURE — 73630 X-RAY EXAM OF FOOT: CPT

## 2021-01-28 PROCEDURE — 99283 EMERGENCY DEPT VISIT LOW MDM: CPT

## 2021-01-28 RX ORDER — ACETAMINOPHEN 500 MG
1000 TABLET ORAL
Qty: 20 TAB | Refills: 0 | Status: SHIPPED | OUTPATIENT
Start: 2021-01-28 | End: 2021-07-26

## 2021-01-28 RX ORDER — IBUPROFEN 800 MG/1
800 TABLET ORAL EVERY 8 HOURS
Qty: 15 TAB | Refills: 0 | Status: SHIPPED | OUTPATIENT
Start: 2021-01-28 | End: 2021-02-02

## 2021-01-28 RX ORDER — CHLORTHALIDONE 25 MG/1
25 TABLET ORAL DAILY
COMMUNITY
End: 2021-07-27 | Stop reason: SDUPTHER

## 2021-01-28 NOTE — ROUTINE PROCESS
Queenie Emerson is a 39 y.o. male that was discharged in stable. Pt was accompanied by self. Pt is driving. The patients diagnosis, condition and treatment were explained to  patient and aftercare instructions were given. The patient verbalized understanding. Patient armband removed and shredded.

## 2021-01-28 NOTE — ED TRIAGE NOTES
Patient states striking toes on left foot against a bed approximately 3-4 days ago. C/o swelling to toes and left foot.

## 2021-01-28 NOTE — ED PROVIDER NOTES
EMERGENCY DEPARTMENT HISTORY AND PHYSICAL EXAM    Date: 1/28/2021  Patient Name: Dannielle New    History of Presenting Illness     Chief Complaint   Patient presents with    Foot Pain    Foot Swelling    Foot Injury         History Provided By: Patient        Additional History (Context): Dannielle New is a 39 y.o. male with hypertension, obesity and hepatitis C, schizophrenia, polysubstance abuse who presents with pain to his left fourth toe after he accidentally bed frame 2 days ago. Denies got painful swelling to his foot as well. Pain is worse with weightbearing. PCP: Enrico, Not On File    Current Outpatient Medications   Medication Sig Dispense Refill    chlorthalidone (HYGROTON) 25 mg tablet Take 25 mg by mouth daily.  ibuprofen (MOTRIN) 800 mg tablet Take 1 Tab by mouth every eight (8) hours for 5 days. 15 Tab 0    acetaminophen (Tylenol Extra Strength) 500 mg tablet Take 2 Tabs by mouth every six (6) hours as needed for Pain. 20 Tab 0    escitalopram oxalate (LEXAPRO) 10 mg tablet TAKE 1 TABLET BY MOUTH ONCE DAILY AT BEDTIME FOR ANXIETY      QUEtiapine (SEROQUEL) 50 mg tablet Take 50 mg by mouth nightly.  methadone (METHADONE INTENSOL) 10 mg/mL solution Take 40 mg by mouth daily.          Past History     Past Medical History:  Past Medical History:   Diagnosis Date    Anxiety 10/11/2016    At risk of diabetes mellitus 12/18/2019    Chondromalacia     Chronic pain of left knee     Continuous nicotine dependence 10/11/2016    Depression     Headache     Hep C w/o coma, chronic ([facility] [address] 10/11/2016    Hepatitis C     Hypertension     Insomnia 10/11/2016    Obesity, Class II, BMI 35-39.9 10/11/2016    Paranoid schizophrenia ([address]     Polysubstance abuse ([address]     Psychotic disorder ([address]     Schizophrenia ([address] 10/11/2016       Past Surgical History:  Past Surgical History:   Procedure Laterality Date    COLONOSCOPY N/A 3/30/2017    COLONOSCOPY performed by Kash Barclay MD at [facility]    HX APPENDECTOMY  2000       Family History:  Family History   Problem Relation Age of Onset    Cancer Mother 36        colon    Cancer Maternal Aunt         breast    Diabetes Father        Social History:  Social History     Tobacco Use    Smoking status: Light Tobacco Smoker     Packs/day: 0.50     Years: 4.00     Pack years: 2.00    Smokeless tobacco: Never Used   Substance Use Topics    Alcohol use: Not Currently    Drug use: No     Types: Heroin     Comment: [facility]       Allergies: Allergies   Allergen Reactions    Pcn [Penicillins] Hives         Review of Systems   Review of Systems   Musculoskeletal: Positive for arthralgias, gait problem and joint swelling. Skin: Negative for color change. Hematological: Does not bruise/bleed easily. All Other Systems Negative  Physical Exam     Vitals:    01/28/21 1355   BP: 131/77   Pulse: (!) 113   Resp: 20   Temp: 98.6 °F (37 °C)   SpO2: 98%   Weight: 120.2 kg (265 lb)   Height: 5' 10\" (1.778 m)     Physical Exam  Vitals signs and nursing note reviewed. Constitutional:       General: He is not in acute distress. Appearance: He is well-developed. He is obese. He is not ill-appearing, toxic-appearing or diaphoretic. HENT:      Head: Normocephalic and atraumatic. Neck:      Musculoskeletal: Normal range of motion and neck supple. Thyroid: No thyromegaly. Vascular: No carotid bruit. Trachea: No tracheal deviation. Cardiovascular:      Rate and Rhythm: Normal rate and regular rhythm. Heart sounds: Normal heart sounds. No murmur. No friction rub. No gallop. Pulmonary:      Effort: Pulmonary effort is normal. No respiratory distress. Breath sounds: Normal breath sounds. No stridor. No wheezing or rales. Chest:      Chest wall: No tenderness. Abdominal:      General: There is no distension. Palpations: Abdomen is soft. There is no mass. Tenderness:  There is no abdominal tenderness. There is no guarding or rebound. Musculoskeletal: Normal range of motion. General: Swelling, tenderness and signs of injury present. Comments: Left great foot: Tenderness to the proximal dorsal fourth phalanx. Moderate dorsal foot swelling distally. DP PT pulses palpable. Cap refill less than 2 seconds in each digit and no open wounds no tenderness in remaining foot. Skin:     General: Skin is warm and dry. Coloration: Skin is not pale. Neurological:      Mental Status: He is alert. Psychiatric:         Speech: Speech normal.         Behavior: Behavior normal.         Thought Content: Thought content normal.         Judgment: Judgment normal.            Diagnostic Study Results     Labs -   No results found for this or any previous visit (from the past 12 hour(s)). Radiologic Studies -   XR FOOT LT MIN 3 V    (Results Pending)     CT Results  (Last 48 hours)    None        CXR Results  (Last 48 hours)    None            Medical Decision Making   I am the first provider for this patient. I reviewed the vital signs, available nursing notes, past medical history, past surgical history, family history and social history. Vital Signs-Reviewed the patient's vital signs. Procedures:  Procedures    Provider Notes (Medical Decision Making): Buddy tape is fourth toe dispense with a shoe and have him follow-up with podiatry. Buddy tape and cast shoe applied by tech; excellent position. N/v intact before and after application. MED RECONCILIATION:  No current facility-administered medications for this encounter. Current Outpatient Medications   Medication Sig    chlorthalidone (HYGROTON) 25 mg tablet Take 25 mg by mouth daily.  ibuprofen (MOTRIN) 800 mg tablet Take 1 Tab by mouth every eight (8) hours for 5 days.  acetaminophen (Tylenol Extra Strength) 500 mg tablet Take 2 Tabs by mouth every six (6) hours as needed for Pain.     escitalopram oxalate (LEXAPRO) 10 mg tablet TAKE 1 TABLET BY MOUTH ONCE DAILY AT BEDTIME FOR ANXIETY    QUEtiapine (SEROQUEL) 50 mg tablet Take 50 mg by mouth nightly.  methadone (METHADONE INTENSOL) 10 mg/mL solution Take 40 mg by mouth daily. Disposition:  home    DISCHARGE NOTE:   2:22 PM    Pt has been reexamined. Patient has no new complaints, changes, or physical findings. Care plan outlined and precautions discussed. Results of x-rays were reviewed with the patient. All medications were reviewed with the patient; will d/c home with ibuprofen, tylenol ES. All of pt's questions and concerns were addressed. Patient was instructed and agrees to follow up with podiatry, as well as to return to the ED upon further deterioration. Patient is ready to go home. Follow-up Information     Follow up With Specialties Details Why Contact Info    Boni Cabrera DPM Podiatry Schedule an appointment as soon as possible for a visit in 1 day  [address] [facility] [address]  [phone] [address] [facility] EMERGENCY DEPT Emergency Medicine  If symptoms worsen return immediately [address] [facility]  [phone]          Current Discharge Medication List      START taking these medications    Details   ibuprofen (MOTRIN) 800 mg tablet Take 1 Tab by mouth every eight (8) hours for 5 days. Qty: 15 Tab, Refills: 0      acetaminophen (Tylenol Extra Strength) 500 mg tablet Take 2 Tabs by mouth every six (6) hours as needed for Pain. Qty: 20 Tab, Refills: 0                 Diagnosis     Clinical Impression:   1.  Nondisplaced fracture of proximal phalanx of left lesser toe(s), initial encounter for closed fracture

## 2021-01-29 ENCOUNTER — APPOINTMENT (OUTPATIENT)
Dept: PHYSICAL THERAPY | Age: 46
End: 2021-01-29
Payer: MEDICAID

## 2021-02-01 NOTE — PROGRESS NOTES
[facility] [address] [facility] [address], [address] [phone] fax    Discharge Summary  Patient name: Nayan Fernandez Start of Care: 2021   Referral source: Columba Ulrich MD : 1975               Medical Diagnosis: Pain in left knee [M25.562]    Onset Date:Dec 4, 2020               Treatment Diagnosis: left knee pain   Prior Hospitalization: see medical history Provider#: [id number]   Medications: Verified on Patient summary List   Comorbidities/PMHx/Surgical Hx:  [x]? ? HTN (controlled), osteoarthritis of the knee, CPAP- sleep apnea. Prior level of function: functionally independent, no AD, active lifestyle, has a dog and plays with his grand kids. Visits from Start of Care: 1    Missed Visits: 3    Reporting Period : 21 to 21      Short Term Goals: To be accomplished in 2 weeks: 1. Patient will report compliance with HEP at least 1x/day to aid in rehabilitation program.        Status at IE: given at evaluation.         Current: Same as IE    Not met - unable to reassess due to unplanned DC        2.Patient will demonstrate AROM of left knee flexion to at least 120 degrees to aid in completion of ADLs.       Status at IE:108 degrees of left knee flexion with pain.         Current: Same as IE    Not met - unable to reassess due to unplanned DC        Long Term Goals: To be accomplished in 4 weeks: 1. Patient will increase strength by at least . 5 grade MMT throughout B LEs to aid in completion of ADLs.               LE[facility] at [address] [facility] (MMT): 5/5 with exceptions indicated below                                            Hip L (1-5) R (1-5)   Hip Flexion 4 P!     Hip Extension tested in s/l due to pain 5-     Hip ABD 4- pain     Hip ADD       Hip ER 4+ pain     Hip IR 4        Knee L (1-5) R (1-5)   Knee Flexion       Knee Extension 4 pain     Ankle PF 2 P knee 4   Ankle DF       Other                Current: Same as IE       Not met - unable to reassess due to unplanned DC        2.Patient will report pain less than 1-2/10 average to aid in completion of ADLs.       Status at IE: 5-9/10 pain        Current: Same as IE    Not met - unable to reassess due to unplanned DC  3. Patient will have at least .5 cm decrease in swelling in the left knee in order to increase ease with left knee flexion ROM to aid in completion of ADLs.             BYXFD[facility] at [address] (cm): 2 inches below mid patella: R:42, L:44.5  Mid patella: R:47 L: 48.5  2 inches above mid patella: R:49, L: 51        Current: Same as IE    Not met - unable to reassess due to unplanned DC  4. Patient will improve FOTO to 61 points overall to demonstrate improvement in functional ability.       Status at IE:40        Current: Same as IE    Not met - unable to reassess due to unplanned DC    Assessment/Summary of care: Mr. Suzette Pompa was seen for evaluation on 1/20/21. Pt did not return for scheduled follow up visits; he recently broke a toe on his involved extremity and reports it is too painful to participate. Due to new onset of symptoms and attendance policy, pt is DC at this time. Should pt require treatment in the future, we would be happy to continue with an updated referral from the physician. RECOMMENDATIONS:  [x]Discontinue therapy: []Patient has reached or is progressing toward set goals      [x]Patient is non-compliant or has abdicated      []Due to lack of appreciable progress towards set goals    Jerrod Pleitez, PT 2/1/2021 3:19 PM    NOTE TO PHYSICIAN:  Please complete the following and fax to: [facility] at [facility] at [phone]  . Retain this original for your records. If you are unable to process this request in   24 hours, please contact our office.      [] I have read the above report and request that my patient continue therapy with the following changes/special instructions:  [] I have read the above report and request that my patient be discharged from therapy    Physician's Signature:____________Date:_________TIME:________    ** Signature, Date and Time must be completed for valid certification **

## 2021-02-03 ENCOUNTER — APPOINTMENT (OUTPATIENT)
Dept: PHYSICAL THERAPY | Age: 46
End: 2021-02-03

## 2021-02-08 ENCOUNTER — APPOINTMENT (OUTPATIENT)
Dept: PHYSICAL THERAPY | Age: 46
End: 2021-02-08

## 2021-02-10 ENCOUNTER — APPOINTMENT (OUTPATIENT)
Dept: PHYSICAL THERAPY | Age: 46
End: 2021-02-10

## 2021-03-18 ENCOUNTER — OFFICE VISIT (OUTPATIENT)
Dept: ORTHOPEDIC SURGERY | Age: 46
End: 2021-03-18
Payer: MEDICAID

## 2021-03-18 VITALS
DIASTOLIC BLOOD PRESSURE: 91 MMHG | TEMPERATURE: 95.5 F | HEIGHT: 70 IN | BODY MASS INDEX: 38.65 KG/M2 | RESPIRATION RATE: 16 BRPM | OXYGEN SATURATION: 99 % | HEART RATE: 100 BPM | WEIGHT: 270 LBS | SYSTOLIC BLOOD PRESSURE: 133 MMHG

## 2021-03-18 DIAGNOSIS — M25.562 CHRONIC PAIN OF LEFT KNEE: ICD-10-CM

## 2021-03-18 DIAGNOSIS — G89.29 CHRONIC PAIN OF LEFT KNEE: ICD-10-CM

## 2021-03-18 DIAGNOSIS — M25.521 RIGHT ELBOW PAIN: ICD-10-CM

## 2021-03-18 DIAGNOSIS — M17.12 PRIMARY OSTEOARTHRITIS OF LEFT KNEE: Primary | ICD-10-CM

## 2021-03-18 DIAGNOSIS — M77.11 LATERAL EPICONDYLITIS OF RIGHT ELBOW: ICD-10-CM

## 2021-03-18 PROCEDURE — 20605 DRAIN/INJ JOINT/BURSA W/O US: CPT | Performed by: SPECIALIST

## 2021-03-18 PROCEDURE — 20610 DRAIN/INJ JOINT/BURSA W/O US: CPT | Performed by: SPECIALIST

## 2021-03-18 PROCEDURE — 99213 OFFICE O/P EST LOW 20 MIN: CPT | Performed by: SPECIALIST

## 2021-03-18 RX ORDER — BETAMETHASONE SODIUM PHOSPHATE AND BETAMETHASONE ACETATE 3; 3 MG/ML; MG/ML
3 INJECTION, SUSPENSION INTRA-ARTICULAR; INTRALESIONAL; INTRAMUSCULAR; SOFT TISSUE ONCE
Status: COMPLETED | OUTPATIENT
Start: 2021-03-18 | End: 2021-03-18

## 2021-03-18 RX ADMIN — BETAMETHASONE SODIUM PHOSPHATE AND BETAMETHASONE ACETATE 3 MG: 3; 3 INJECTION, SUSPENSION INTRA-ARTICULAR; INTRALESIONAL; INTRAMUSCULAR; SOFT TISSUE at 16:22

## 2021-03-18 RX ADMIN — BETAMETHASONE SODIUM PHOSPHATE AND BETAMETHASONE ACETATE 3 MG: 3; 3 INJECTION, SUSPENSION INTRA-ARTICULAR; INTRALESIONAL; INTRAMUSCULAR; SOFT TISSUE at 16:23

## 2021-03-18 NOTE — PROGRESS NOTES
Patient: Rafael Easton                MRN: [id number]       SSN: [id number]  YOB: 1975        AGE: 39 y.o. SEX: male    PCP: Enrico, Not On File  03/18/21    CC: LEFT KNEE AND RIGHT ELBOW PAIN    HISTORY:  Rafael Easton is a 39 y.o. male who is seen for left knee and right elbow pain. He does not recall any elbow injury. He feels lateral right elbow pain with lifting and gripping. He is also seen for left knee pain. He has been experiencing left knee pain for the past week. He does not recall any injury. He noticed knee tingling and throbbing on 1/4/20. He feels pain with standing, walking and stair climbing. He experiences startup pain after sitting. His physical therapy was put on hold recently when he broke his toe while getting out of the bathtub. He has seen Dr. eVnessa Manning for right foot pain in the past.  Pain Assessment  3/18/2021   Location of Pain Knee   Location Modifiers Left   Severity of Pain 7   Quality of Pain Sharp   Duration of Pain Persistent   Frequency of Pain Constant   Aggravating Factors Bending;Stretching;Walking   Aggravating Factors Comment -   Limiting Behavior -   Relieving Factors Ice;Elevation   Relieving Factors Comment -   Result of Injury -   Work-Related Injury -   Type of Injury -   Type of Injury Comment -     Occupation, etc:  Mr. [place] No receives social security disability benefits for a learning disability. He previously worked as a  at [employer]. He lives in [place] with his wife. He has a yorkie named Benita. He has 3 sons and 2 daughters. He has 17 grandchildren that keep him busy. He is hypertensive. Mr. [place] No weighs 265 lbs and is 5'10\" tall.        Lab Results   Component Value Date/Time    Hemoglobin A1c 6.6 (H) 04/11/2019 08:31 AM    Hemoglobin A1c (POC) 6.0 12/18/2019 01:49 PM     Weight Metrics 3/18/2021 1/28/2021 1/15/2021 1/10/2021 11/30/2020 8/19/2020 3/13/2020   Weight 270 lb 265 lb 266 lb 265 lb 260 lb 275 lb 275 lb   BMI 38.74 kg/m2 38.02 kg/m2 38.17 kg/m2 38.02 kg/m2 37.31 kg/m2 39.46 kg/m2 39.46 kg/m2       Patient Active Problem List   Diagnosis Code    Schizophrenia ([facility] [address] F20.9    Continuous nicotine dependence F17.200    Insomnia G47.00    Anxiety F41.9    Hep C w/o coma, chronic (HCC) B18.2    Severe obesity (HCC) E66.01    At risk of diabetes mellitus Z91.89     REVIEW OF SYSTEMS:    Constitutional Symptoms: Negative   Eyes: Negative   Ears, Nose, Throat and Mouth: Negative   Cardiovascular: Negative   Respiratory: Negative   Genitourinary: Per HPI   Gastrointestinal: Per HPI   Integumentary (Skin and/or Breast): Negative   Musculoskeletal: Per HPI   Endocrine/Rheumatologic: Negative   Neurological: Per HPI   Hematology/Lymphatic: Negative    Allergic/Immunologic: Negative   Phychiatric: Negative    Social History     Socioeconomic History    Marital status: LEGALLY      Spouse name: Not on file    Number of children: Not on file    Years of education: Not on file    Highest education level: Not on file   Occupational History    Not on file   Social Needs    Financial resource strain: Not on file    Food insecurity     Worry: Not on file     Inability: Not on file    Transportation needs     Medical: Not on file     Non-medical: Not on file   Tobacco Use    Smoking status: Light Tobacco Smoker     Packs/day: 0.50     Years: 4.00     Pack years: 2.00    Smokeless tobacco: Never Used   Substance and Sexual Activity    Alcohol use: Not Currently    Drug use: No     Types: Heroin     Comment: [facility]    Sexual activity: Yes     Partners: Female   Lifestyle    Physical activity     Days per week: Not on file     Minutes per session: Not on file    Stress: Not on file   Relationships    Social connections     Talks on phone: Not on file     Gets together: Not on file     Attends Shinto service: Not on file     Active member of club or organization: Not on file Attends meetings of clubs or organizations: Not on file     Relationship status: Not on file    Intimate partner violence     Fear of current or ex partner: Not on file     Emotionally abused: Not on file     Physically abused: Not on file     Forced sexual activity: Not on file   Other Topics Concern    Not on file   Social History Narrative    Not on file      Allergies   Allergen Reactions    Pcn [Penicillins] Hives      Current Outpatient Medications   Medication Sig    chlorthalidone (HYGROTON) 25 mg tablet Take 25 mg by mouth daily.  acetaminophen (Tylenol Extra Strength) 500 mg tablet Take 2 Tabs by mouth every six (6) hours as needed for Pain.  escitalopram oxalate (LEXAPRO) 10 mg tablet TAKE 1 TABLET BY MOUTH ONCE DAILY AT BEDTIME FOR ANXIETY    QUEtiapine (SEROQUEL) 50 mg tablet Take 50 mg by mouth nightly.  methadone (METHADONE INTENSOL) 10 mg/mL solution Take 40 mg by mouth daily. No current facility-administered medications for this visit.        PHYSICAL EXAMINATION:  Visit Vitals  BP (!) 133/91 (BP 1 Location: Left upper arm, BP Patient Position: Sitting, BP Cuff Size: Large adult)   Pulse 100   Temp (!) 95.5 °F (35.3 °C) (Temporal)   Resp 16   Ht 5' 10\" (1.778 m)   Wt 270 lb (122.5 kg)   SpO2 99%   BMI 38.74 kg/m²      ORTHO EXAMINATION:  Examination Right knee Left knee   Skin Intact Intact   Range of motion 120-0 100-0   Effusion - +   Medial joint line tenderness - +   Lateral joint line tenderness - -   Popliteal tenderness - -   Osteophytes palpable - -   Carters - -   Patella crepitus - -   Anterior drawer - -   Lateral laxity - -   Medial laxity - -   Varus deformity - -   Valgus deformity - -   Pretibial edema - 2+   Calf tenderness - -     Examination Right Elbow Left Elbow   Skin Intact Intact   Range of Motion 135-0 135-0   Tenderness + lateral epicondyle -   Swelling - -   Bruising - -   Stability Normal Normal   Motor Strength  Normal Normal   Neurovascular Intact Intact             TIME OUT:  Chart reviewed for the following:   Ben Newman MD, have reviewed the History, Physical and updated the Allergic reactions for [id number] performed immediately prior to start of procedure:  Ben Newman MD, have performed the following reviews on [address] prior to the start of the procedure:          * Patient was identified by name and date of birth   * Agreement on procedure being performed was verified  * Risks and Benefits explained to the patient  * Procedure site verified and marked as necessary  * Patient was positioned for comfort  * Consent was obtained     Time: 4:13 PM     Date of procedure: 3/18/2021  Procedure performed by:  Emir Hanna MD  Mr. Sanjay Peterson tolerated the procedure well with no complications. RADIOGRAPHS:  XR LEFT KNEE 1/10/21 [facility]  IMPRESSION:  Minimal DJD at the patellofemoral joint. IMPRESSION:      ICD-10-CM ICD-9-CM    1. Primary osteoarthritis of left knee  M17.12 715.16 betamethasone (CELESTONE) injection 3 mg      DRAIN/INJECT LARGE JOINT/BURSA      REFERRAL TO PHYSICAL THERAPY   2. Chronic pain of left knee  M25.562 719.46 betamethasone (CELESTONE) injection 3 mg    G89.29 338.29 DRAIN/INJECT LARGE JOINT/BURSA   3. Right elbow pain  M25.521 719.42 DRAIN/INJECT INTERMEDIATE JOINT/BURSA      betamethasone (CELESTONE) injection 3 mg   4. Lateral epicondylitis of right elbow  M77.11 726.32 DRAIN/INJECT INTERMEDIATE JOINT/BURSA      betamethasone (CELESTONE) injection 3 mg      AMB SUPPLY ORDER     PLAN:  Patient was fitted comfortably with a tennis elbow strap. He will start a brief course of outpatient physical therapy. After discussing treatment options, patient's left knee and right lateral epicondylar areas were injected with 4 cc Marcaine and 1/2 cc Celestone. There is no need for surgery at this time. He will follow up as needed.       Scribed by Francis Amaya (Harry Iron) as dictated by Logan Santos. Feliz Portillo MD

## 2021-04-02 ENCOUNTER — APPOINTMENT (OUTPATIENT)
Dept: PHYSICAL THERAPY | Age: 46
End: 2021-04-02

## 2021-04-20 ENCOUNTER — APPOINTMENT (OUTPATIENT)
Dept: PHYSICAL THERAPY | Age: 46
End: 2021-04-20

## 2021-05-05 ENCOUNTER — APPOINTMENT (OUTPATIENT)
Dept: PHYSICAL THERAPY | Age: 46
End: 2021-05-05

## 2021-07-26 ENCOUNTER — HOSPITAL ENCOUNTER (EMERGENCY)
Age: 46
Discharge: HOME | End: 2021-07-27
Attending: EMERGENCY MEDICINE
Payer: MEDICAID

## 2021-07-26 ENCOUNTER — APPOINTMENT (OUTPATIENT)
Dept: GENERAL RADIOLOGY | Age: 46
End: 2021-07-26
Attending: EMERGENCY MEDICINE
Payer: MEDICAID

## 2021-07-26 VITALS
OXYGEN SATURATION: 99 % | HEART RATE: 86 BPM | TEMPERATURE: 99.2 F | WEIGHT: 265 LBS | SYSTOLIC BLOOD PRESSURE: 140 MMHG | DIASTOLIC BLOOD PRESSURE: 88 MMHG | BODY MASS INDEX: 38.02 KG/M2 | RESPIRATION RATE: 12 BRPM

## 2021-07-26 DIAGNOSIS — M17.12 LOCALIZED OSTEOARTHRITIS OF LEFT KNEE: ICD-10-CM

## 2021-07-26 DIAGNOSIS — Z20.822 SUSPECTED COVID-19 VIRUS INFECTION: Primary | ICD-10-CM

## 2021-07-26 PROCEDURE — 99282 EMERGENCY DEPT VISIT SF MDM: CPT

## 2021-07-26 PROCEDURE — 73562 X-RAY EXAM OF KNEE 3: CPT

## 2021-07-27 LAB — SARS-COV-2, COV2: NORMAL

## 2021-07-27 PROCEDURE — U0003 INFECTIOUS AGENT DETECTION BY NUCLEIC ACID (DNA OR RNA); SEVERE ACUTE RESPIRATORY SYNDROME CORONAVIRUS 2 (SARS-COV-2) (CORONAVIRUS DISEASE [COVID-19]), AMPLIFIED PROBE TECHNIQUE, MAKING USE OF HIGH THROUGHPUT TECHNOLOGIES AS DESCRIBED BY CMS-2020-01-R: HCPCS

## 2021-07-27 RX ORDER — CHLORTHALIDONE 25 MG/1
25 TABLET ORAL DAILY
Qty: 90 TABLET | Refills: 3 | Status: SHIPPED | OUTPATIENT
Start: 2021-07-27 | End: 2022-07-27

## 2021-07-27 RX ORDER — PREDNISONE 50 MG/1
50 TABLET ORAL DAILY
Qty: 5 TABLET | Refills: 0 | Status: SHIPPED | OUTPATIENT
Start: 2021-07-27 | End: 2021-08-01

## 2021-07-27 NOTE — ED NOTES
I have reviewed discharge instructions with the patient. The patient verbalized understanding. Patient armband removed and given to patient to take home. Patient was informed of the privacy risks if armband lost or stolen  Current Discharge Medication List      START taking these medications    Details   predniSONE (DELTASONE) 50 mg tablet Take 1 Tablet by mouth daily for 5 days. Qty: 5 Tablet, Refills: 0  Start date: 7/27/2021, End date: 8/1/2021         CONTINUE these medications which have CHANGED    Details   chlorthalidone (HYGROTON) 25 mg tablet Take 1 Tablet by mouth daily.   Qty: 90 Tablet, Refills: 3  Start date: 7/27/2021, End date: 7/27/2022

## 2021-07-28 ENCOUNTER — PATIENT OUTREACH (OUTPATIENT)
Dept: CASE MANAGEMENT | Age: 46
End: 2021-07-28

## 2021-07-28 LAB — SARS-COV-2, COV2NT: NOT DETECTED

## 2021-07-28 NOTE — PROGRESS NOTES
Patient contacted regarding SZUTU-36 Rule out. Discussed COVID-19 related testing which was pending at this time. Test results were pending. Patient informed of results, if available? Still pending. Care Transition Nurse contacted the patient by telephone to perform post discharge assessment. Call within 2 business days of discharge: Yes Verified name and  with patient as identifiers. Provided introduction to self, and explanation of the CTN/ACM role, and reason for call due to risk factors for infection and/or exposure to COVID-19. Patient reported that he is doing well and symptoms are better. Patient states that he knows to quarantine until his test result is back. No new or worsening of symptoms reported by Pt. At this time. Pt. States that he has not received covid vaccine. No questions, concerns and/or needs at this time as per Pt. Reminded Pt. to go to the nearest emergency room for chest pain, shortness of breath, returning of symptoms that brought him to the emergency room and/or worsening of symptoms. Pt. Verbalized and repeated back understanding. Symptoms reviewed with patient who verbalized the following symptoms: no new symptoms and no worsening symptoms      Due to no new or worsening symptoms encounter was not routed to provider for escalation. Discussed follow-up appointments. If no appointment was previously scheduled, appointment scheduling offered:  Pt. Will self schedule PCP apt. [facility] follow up appointment(s): No future appointments. Non-[facility] follow up appointment(s): none noted at this time. Interventions to address risk factors: closely follow up with PCP. Advance Care Planning:   Does patient have an Advance Directive: not on file. Educated patient about risk for severe COVID-19 due to risk factors according to CDC guidelines.  CTN reviewed discharge instructions, medical action plan and red flag symptoms with the patient who verbalized understanding. Discussed COVID vaccination status: yes. Education provided on COVID-19 vaccination as appropriate. Discussed exposure protocols and quarantine with CDC Guidelines. Patient was given an opportunity to verbalize any questions and concerns and agrees to contact CTN or health care provider for questions related to their healthcare. Reviewed and educated patient on any new and changed medications related to discharge diagnosis     Was patient discharged with a pulse oximeter? no     CTN provided contact information. Plan for follow up in 7-14 days  based on severity of symptoms and risk factors.

## 2021-08-03 NOTE — ED PROVIDER NOTES
EMERGENCY DEPARTMENT HISTORY AND PHYSICAL EXAM      Date: 7/26/2021  Patient Name: Merlinda Curt    History of Presenting Illness     Chief Complaint   Patient presents with    Knee Pain    Headache       History (Context): Merlinda Curt is a 39 y.o. *gentleman with chronic knee pain who presents with knee pain. No clear exacerbating/rating features or associated symptoms. Patient also would like to be tested for COVID-19. On review of systems, the patient denies fever, chills, rashes, numbness, weakness, tingling    PCP: [facility], Not On File    Current Outpatient Medications   Medication Sig Dispense Refill    chlorthalidone (HYGROTON) 25 mg tablet Take 1 Tablet by mouth daily. 90 Tablet 3    escitalopram oxalate (LEXAPRO) 10 mg tablet TAKE 1 TABLET BY MOUTH ONCE DAILY AT BEDTIME FOR ANXIETY      QUEtiapine (SEROQUEL) 50 mg tablet Take 50 mg by mouth nightly.  methadone (METHADONE INTENSOL) 10 mg/mL solution Take 40 mg by mouth daily.          Past History     Past Medical History:  Past Medical History:   Diagnosis Date    Anxiety 10/11/2016    At risk of diabetes mellitus 12/18/2019    Chondromalacia     Chronic pain of left knee     Continuous nicotine dependence 10/11/2016    Depression     Diabetes ([address]     Headache     Hep C w/o coma, chronic ([address] 10/11/2016    Hepatitis C     Hypertension     Insomnia 10/11/2016    Obesity, Class II, BMI 35-39.9 10/11/2016    Paranoid schizophrenia ([address]     Polysubstance abuse ([address]     Psychotic disorder ([address]     Schizophrenia ([address] 10/11/2016       Past Surgical History:  Past Surgical History:   Procedure Laterality Date    COLONOSCOPY N/A 3/30/2017    COL[facility] performed by [facility]um[facility] Lopez MD at [facility]COPY    HX APPENDECTOMY  2000       Family History:  Family History   Problem Relation Age of Onset    Cancer Mother 36        colon    Cancer Maternal Aunt         breast    Diabetes Father        Social History:  Social History Tobacco Use    Smoking status: Light Tobacco Smoker     Packs/day: 0.50     Years: 4.00     Pack years: 2.00    Smokeless tobacco: Never Used   Substance Use Topics    Alcohol use: Not Currently    Drug use: No     Types: Heroin     Comment: [facility]       Allergies: Allergies   Allergen Reactions    Pcn [Penicillins] Hives       PMH, PSH, family history, social history, allergies reviewed with the patient with significant items noted above. Review of Systems   As per HPI, otherwise reviewed and negative. Physical Exam     Vitals:    07/26/21 2337   BP: (!) 140/88   Pulse: 86   Resp: 12   Temp: 99.2 °F (37.3 °C)   SpO2: 99%   Weight: 120.2 kg (265 lb)       Gen: Well-appearing, in no acute distress   HEENT: Normocephalic, sclera anicteric  Cardiovascular: Normal rate, regular rhythm, no murmurs, rubs, gallops. Pulses intact and equal distally. Pulmonary: No respiratory distress. No stridor. Clear lungs. ABD: Soft, nontender, nondistended. Neuro: Alert. Normal speech. Normal mentation. Psych: Normal thought content and thought processes. : No CVA tenderness  EXT: Mild pain with range of motion of left knee. Moves all extremities well. No cyanosis or clubbing. Skin: Warm and well-perfused. Other:        Diagnostic Study Results     Labs -   No results found for this or any previous visit (from the past 12 hour(s)). Radiologic Studies -   XR KNEE LT 3 V   Final Result   Findings/impression:      Venous varicosities and soft tissue prominence noted. Probable small effusion. Grossly similar degenerative changes of the knee. No acute fracture. If symptoms   persist consider MRI. CT Results  (Last 48 hours)    None        CXR Results  (Last 48 hours)    None            Medical Decision Making   I am the first provider for this patient.     I reviewed the vital signs, available nursing notes, past medical history, past surgical history, family history and social history. Vital Signs-Reviewed the patient's vital signs. Records Reviewed: Personally, on initial evaluation    MDM:   Patient presents with left knee pain. Exam significant for same. DDX considered: Left knee pain, osteoarthritis  DDX thought to be less likely but also considered due to high risk condition:     Patient condition on initial evaluation: Stable    Plan:       Orders as below:  Orders Placed This Encounter    XR KNEE LT 3 V    SARS-COV-2    NOVEL CORONAVIRUS (COVID-19)    chlorthalidone (HYGROTON) 25 mg tablet    predniSONE (DELTASONE) 50 mg tablet        ED Course:      X-ray negative. Patient condition at time of disposition: Stable  DISCHARGE NOTE:     Follow-up Information     Follow up With Specialties Details Why [place] [address] EMERGENCY DEPT Emergency Medicine Go to  As needed, If symptoms worsen [address] [place] [address]  [phone]          Discharge Medication List as of 7/27/2021 12:43 AM      START taking these medications    Details   predniSONE (DELTASONE) 50 mg tablet Take 1 Tablet by mouth daily for 5 days. , Print, Disp-5 Tablet, R-0         CONTINUE these medications which have CHANGED    Details   chlorthalidone (HYGROTON) 25 mg tablet Take 1 Tablet by mouth daily. , Normal, Disp-90 Tablet, R-3         CONTINUE these medications which have NOT CHANGED    Details   escitalopram oxalate (LEXAPRO) 10 mg tablet TAKE 1 TABLET BY MOUTH ONCE DAILY AT BEDTIME FOR ANXIETY, Historical Med      QUEtiapine (SEROQUEL) 50 mg tablet Take 50 mg by mouth nightly., Historical Med      methadone (METHADONE INTENSOL) 10 mg/mL solution Take 40 mg by mouth daily. , Historical Med             Procedures:  Procedures      Critical Care Time:     Disposition: Discharge    Diagnosis     Clinical Impression:   1. Suspected COVID-19 virus infection    2.  Localized osteoarthritis of left knee        Signed,  Libia Ponce MD  Emergency Physician  [facility]    As a voice dictation software was utilized to dictate this note, minor word transpositions can occur. I apologize for confusing wording and typographic errors. Please feel free to contact me for clarification.

## 2021-08-10 VITALS
WEIGHT: 270 LBS | HEIGHT: 70 IN | BODY MASS INDEX: 38.65 KG/M2 | OXYGEN SATURATION: 96 % | HEART RATE: 99 BPM | TEMPERATURE: 98.5 F | SYSTOLIC BLOOD PRESSURE: 134 MMHG | DIASTOLIC BLOOD PRESSURE: 93 MMHG | RESPIRATION RATE: 16 BRPM

## 2021-08-10 PROCEDURE — 99283 EMERGENCY DEPT VISIT LOW MDM: CPT

## 2021-08-11 ENCOUNTER — HOSPITAL ENCOUNTER (EMERGENCY)
Age: 46
Discharge: HOME | End: 2021-08-11
Attending: EMERGENCY MEDICINE
Payer: MEDICAID

## 2021-08-11 DIAGNOSIS — G89.29 CHRONIC PAIN OF LEFT KNEE: Primary | ICD-10-CM

## 2021-08-11 DIAGNOSIS — M25.562 CHRONIC PAIN OF LEFT KNEE: Primary | ICD-10-CM

## 2021-08-11 PROCEDURE — 74011250637 HC RX REV CODE- 250/637: Performed by: EMERGENCY MEDICINE

## 2021-08-11 RX ORDER — METHYLPREDNISOLONE 4 MG/1
TABLET ORAL
Qty: 1 DOSE PACK | Refills: 0 | Status: SHIPPED | OUTPATIENT
Start: 2021-08-11 | End: 2021-09-28

## 2021-08-11 RX ORDER — HYDROCODONE BITARTRATE AND ACETAMINOPHEN 5; 325 MG/1; MG/1
1 TABLET ORAL
Qty: 6 TABLET | Refills: 0 | Status: SHIPPED | OUTPATIENT
Start: 2021-08-11 | End: 2021-08-14

## 2021-08-11 RX ORDER — NAPROXEN 250 MG/1
500 TABLET ORAL
Status: COMPLETED | OUTPATIENT
Start: 2021-08-11 | End: 2021-08-11

## 2021-08-11 RX ADMIN — NAPROXEN 500 MG: 250 TABLET ORAL at 00:52

## 2021-08-11 NOTE — ED PROVIDER NOTES
EMERGENCY DEPARTMENT HISTORY AND PHYSICAL EXAM    12:34 AM    Date: 8/11/2021  Patient Name: Bhupendra Jimenes    History of Presenting Illness     Chief Complaint   Patient presents with    Knee Pain       History Provided By: Patient  Location/Duration/Severity/Modifying factors   27-year-old male who presents to the emergency department with a chief complaint of left knee pain which has been going on for 8 months. Denies any injury reports it started after he was sitting in a car from a long trip to [place]. This was 8 or 9 months ago. Seems to be worsening over the past 2 months. Describes an area of pain medially along the medial joint line as well as swelling laterally in the approximately 1 to 2 o'clock position adjacent to the patella. Denies any fevers no calf pain or swelling no thigh pain. Worsens with ambulation weightbearing and movement. PCP: None    Current Outpatient Medications   Medication Sig Dispense Refill    methylPREDNISolone (Medrol, Jacinto,) 4 mg tablet Take per dosepak instructions 1 Dose Pack 0    HYDROcodone-acetaminophen (Norco) 5-325 mg per tablet Take 1 Tablet by mouth every six (6) hours as needed for Pain for up to 3 days. Max Daily Amount: 4 Tablets. 6 Tablet 0    chlorthalidone (HYGROTON) 25 mg tablet Take 1 Tablet by mouth daily. 90 Tablet 3    escitalopram oxalate (LEXAPRO) 10 mg tablet TAKE 1 TABLET BY MOUTH ONCE DAILY AT BEDTIME FOR ANXIETY      QUEtiapine (SEROQUEL) 50 mg tablet Take 50 mg by mouth nightly.  methadone (METHADONE INTENSOL) 10 mg/mL solution Take 40 mg by mouth daily.          Past History     Past Medical History:  Past Medical History:   Diagnosis Date    Anxiety 10/11/2016    At risk of diabetes mellitus 12/18/2019    Chondromalacia     Chronic pain of left knee     Continuous nicotine dependence 10/11/2016    Depression     [facility]     Headache     Hep C w/o coma, chronic ([facility] 10/11/2016    Hepatitis C     Hypertension  Insomnia 10/11/2016    Obesity, Class II, BMI 35-39.9 10/11/2016    [facility]anoid [facility]     Polysubstance abuse ([facility]     Psychotic disorder ([facility])     Schizophrenia ([facility] 10/11/2016       Past Surgical History:  Past Surgical History:   Procedure Laterality Date    COLONOSCOPY N/A 3/30/2017    COL[facility] performed by Italia Grover MD at [facility] ENDOSCOPY    HX APPENDECTOMY  2000       Family History:  Family History   Problem Relation Age of Onset    Cancer Mother 36        colon    Cancer Maternal Aunt         breast    Diabetes Father        Social History:  Social History     Tobacco Use    Smoking status: Light Tobacco Smoker     Packs/day: 0.50     Years: 4.00     Pack years: 2.00    Smokeless tobacco: Never Used   Substance Use Topics    Alcohol use: Not Currently    Drug use: No     Types: Heroin     Comment: [facility]       Allergies: Allergies   Allergen Reactions    Pcn [Penicillins] Hives       I reviewed and confirmed the above information with patient and updated as necessary. Review of Systems     Review of Systems   Constitutional: Negative for fever. HENT: Negative for congestion and rhinorrhea. Eyes: Negative for visual disturbance. Respiratory: Negative for cough and shortness of breath. Cardiovascular: Negative for chest pain. Gastrointestinal: Negative for abdominal pain, diarrhea, nausea and vomiting. Genitourinary: Negative for urgency. Musculoskeletal: Positive for arthralgias. Negative for back pain and myalgias. Skin: Negative for rash. Neurological: Negative for headaches. Physical Exam     Visit Vitals  BP (!) 134/93 (BP 1 Location: Left arm, BP Patient Position: Sitting)   Pulse 99   Temp 98.5 °F (36.9 °C)   Resp 16   Ht 5' 10\" (1.778 m)   Wt 122.5 kg (270 lb)   SpO2 96%   BMI 38.74 kg/m²       Physical Exam  Constitutional:       Appearance: Normal appearance.    HENT:      Head: Normocephalic and atraumatic. Right Ear: External ear normal.      Left Ear: External ear normal.      Nose: Nose normal.   Eyes:      Conjunctiva/sclera: Conjunctivae normal.   Cardiovascular:      Rate and Rhythm: Normal rate and regular rhythm. Pulses: Normal pulses. Pulmonary:      Effort: Pulmonary effort is normal.      Breath sounds: Normal breath sounds. Abdominal:      General: Abdomen is flat. Tenderness: There is no abdominal tenderness. Musculoskeletal:         General: Swelling and tenderness present. Normal range of motion. Cervical back: Normal range of motion and neck supple. Right lower leg: No edema. Left lower leg: No edema. Comments: Moderate effusion, there is no redness, the knee moves freely on passive range of motion without significant pain or restriction. The patellar is nontender. There is no bony tenderness. The majority of the pain is along the medial joint line. No calf pain or swelling of the lower extremity otherwise   Skin:     General: Skin is warm and dry. Neurological:      General: No focal deficit present. Mental Status: He is alert. Diagnostic Study Results     Labs -  No results found for this or any previous visit (from the past 24 hour(s)). Radiologic Studies -   No orders to display           Medical Decision Making   I am the first provider for this patient. I reviewed the vital signs, available nursing notes, past medical history, past surgical history, family history and social history. Vital Signs-Reviewed the patient's vital signs.     EKG: N/A    Records Reviewed: Nursing Notes, Old Medical Records, Previous Radiology Studies and Previous Laboratory Studies (Time of Review: 12:34 AM)    ED Course: Progress Notes, Reevaluation, and Consults:         Provider Notes (Medical Decision Making):   MDM  Number of Diagnoses or Management Options  Chronic pain of left knee  Diagnosis management comments: 40-year-old male who presents with left knee pain which has been a chronic issue for him. He was seen a few weeks ago he had an x-ray done I reviewed the x-ray looks like significant osteoarthritis. Given the chronicity of the symptoms for 8 months and there is no redness fever or joint range of motion restriction I think the likelihood of septic arthritis is exceedingly low. Likewise given 8 months of symptoms, pain isolated to the knee with a knee effusion I think the likelihood of DVT is also quite low. I do believe the patient came in this evening primarily because his wife is also being seen in the emergency department for an unrelated issue. I offered the patient a repeat x-ray although advised it would likely not  as given no new injury there is not likely to be another fracture or knee dislocation. He is overweight and I counseled that losing weight would likely help with a lot of this pain. He needs to see orthopedic surgery likely and have an MRI to look for internal derangement. I will give the patient an Ace wrap and Medrol Dosepak as well as some pain medication use at home. At this time, patient is stable and appropriate for discharge home.  Patient demonstrates understanding of current diagnoses and is in agreement with the treatment plan. Mahendra Conchita are advised that while the likelihood of serious underlying condition is low at this point given the evaluation performed today, we cannot fully rule it out. Mahendra Conchita are advised to immediately return with any new symptoms or worsening of current condition.  All questions have been answered. Elva Maldonado is given educational material regarding their diagnoses, including danger symptoms and when to return to the ED. This note was dictated utilizing Dragon voice recognition software. Unfortunately this leads to occasional typographical errors. I apologize in advance if the situation occurs.  If questions occur please do not hesitate to contact me directly. Darren Suero DO          Procedures    Critical Care Time: N/A    Diagnosis     Clinical Impression:   1. Chronic pain of left knee        Disposition: Discharge    Follow-up Information     Follow up [facility] Specialties [facility] Emergency Medicine  As needed, If symptoms worsen; or [facility] Emergency Department [address] [place]  [phone]    Your Primary Care Physician  In 3 days      [facility] HOSP AT [facility]   Primary Care Resource [address] [place] [address] [address]    Silvia Regalado MD Orthopedic Surgery In 2 days Follow-up in 2 to 3 days you need to have further evaluation which may include MRI or other testing. [address]  [phone]             Discharge Medication List as of 8/11/2021 12:53 AM      START taking these medications    Details   methylPREDNISolone (Medrol, Jacinto,) 4 mg tablet Take per dosepak instructions, Normal, Disp-1 Dose Pack, R-0      HYDROcodone-acetaminophen (Norco) 5-325 mg per tablet Take 1 Tablet by mouth every six (6) hours as needed for Pain for up to 3 days. Max Daily Amount: 4 Tablets., Normal, Disp-6 Tablet, R-0         CONTINUE these medications which have NOT CHANGED    Details   chlorthalidone (HYGROTON) 25 mg tablet Take 1 Tablet by mouth daily. , Normal, Disp-90 Tablet, R-3      escitalopram oxalate (LEXAPRO) 10 mg tablet TAKE 1 TABLET BY MOUTH ONCE DAILY AT BEDTIME FOR ANXIETY, Historical Med      QUEtiapine (SEROQUEL) 50 mg tablet Take 50 mg by mouth nightly., Historical Med      methadone (METHADONE INTENSOL) 10 mg/mL solution Take 40 mg by mouth daily. , [address] Marcie Ortiz DO   Emergency Medicine   August 11, 2021, 12:34 AM     This note is dictated utilizing Dragon voice recognition software.  Unfortunately this leads to occasional typographical errors using the voice recognition. I apologize in advance if the situation occurs. If questions occur please do not hesitate to contact me directly.     Stew Multani, DO

## 2022-01-06 ENCOUNTER — HOSPITAL ENCOUNTER (EMERGENCY)
Age: 47
Discharge: HOME | End: 2022-01-06
Attending: STUDENT IN AN ORGANIZED HEALTH CARE EDUCATION/TRAINING PROGRAM
Payer: MEDICAID

## 2022-01-06 VITALS
TEMPERATURE: 98.8 F | BODY MASS INDEX: 38.94 KG/M2 | WEIGHT: 272 LBS | HEART RATE: 99 BPM | DIASTOLIC BLOOD PRESSURE: 64 MMHG | RESPIRATION RATE: 19 BRPM | OXYGEN SATURATION: 96 % | SYSTOLIC BLOOD PRESSURE: 110 MMHG | HEIGHT: 70 IN

## 2022-01-06 DIAGNOSIS — R51.9 NONINTRACTABLE HEADACHE, UNSPECIFIED CHRONICITY PATTERN, UNSPECIFIED HEADACHE TYPE: ICD-10-CM

## 2022-01-06 DIAGNOSIS — B34.9 VIRAL SYNDROME: ICD-10-CM

## 2022-01-06 DIAGNOSIS — M79.10 MYALGIA: ICD-10-CM

## 2022-01-06 DIAGNOSIS — Z20.822 ENCOUNTER FOR LABORATORY TESTING FOR COVID-19 VIRUS: Primary | ICD-10-CM

## 2022-01-06 LAB
SARS-COV-2, COV2: NORMAL
SARS-COV-2, NAA: NOT DETECTED

## 2022-01-06 PROCEDURE — 99282 EMERGENCY DEPT VISIT SF MDM: CPT

## 2022-01-06 PROCEDURE — U0003 INFECTIOUS AGENT DETECTION BY NUCLEIC ACID (DNA OR RNA); SEVERE ACUTE RESPIRATORY SYNDROME CORONAVIRUS 2 (SARS-COV-2) (CORONAVIRUS DISEASE [COVID-19]), AMPLIFIED PROBE TECHNIQUE, MAKING USE OF HIGH THROUGHPUT TECHNOLOGIES AS DESCRIBED BY CMS-2020-01-R: HCPCS

## 2022-01-06 NOTE — ED PROVIDER NOTES
EMERGENCY DEPARTMENT HISTORY AND PHYSICAL EXAM      Date: 1/6/2022  Patient Name: Rajesh Lieberman    History of Presenting Illness     Chief Complaint   Patient presents with    Concern For COVID-19 (Coronavirus)       History Provided By: Patient    HPI: Rajesh Lieberman, 55 y.o. male presents to the ED with CC of headace, myalgia and SOB. Patient denies chest pain, or any neurological symptoms. There are no other complaints, changes, or physical findings at this time. Past History     Past Medical History:  Past Medical History:   Diagnosis Date    Anxiety 10/11/2016    At risk of diabetes mellitus 12/18/2019    Chondromalacia     Chronic pain of left knee     Continuous nicotine dependence 10/11/2016    Depression     Diabetes ([address]     Headache     Hep C w/o coma, chronic ([address] 10/11/2016    Hepatitis C     Hypertension     Insomnia 10/11/2016    Obesity, Class II, BMI 35-39.9 10/11/2016    [facility]anoid [facility] ([facility]     Polysubstance abuse ([facility]     Psychotic disorder ([facility]     Schizophrenia ([facility] 10/11/2016    Sleep apnea     Uses C-pap machine       Allergies: Allergies   Allergen Reactions    Pcn [Penicillins] Hives       Review of Systems   Vital signs and nursing notes reviewed  Review of Systems   Constitutional: Positive for fatigue. Negative for chills and fever. HENT: Negative for rhinorrhea and sore throat. Eyes: Negative for discharge and redness. Respiratory: Positive for cough. Negative for shortness of breath. Cardiovascular: Negative for chest pain and leg swelling. Gastrointestinal: Negative for abdominal pain, diarrhea, nausea and vomiting. Genitourinary: Negative for difficulty urinating and dysuria. Musculoskeletal: Negative for back pain and neck pain. Skin: Negative for rash and wound. Neurological: Positive for headaches. Negative for syncope and light-headedness.          Physical Exam     Visit Vitals  /64 (BP 1 Location: Left upper arm, BP Patient Position: At rest)   Pulse 99   Temp 98.8 °F (37.1 °C)   Resp 19   Ht 5' 10\" (1.778 m)   Wt 123.4 kg (272 lb)   SpO2 96%   BMI 39.03 kg/m²     CONSTITUTIONAL: Alert, in no distress. Appears stated age. HEAD:  Normocephalic, atraumatic  EYES: EOM intact. No conjunctival injection or scleral icterus  Neck:  Supple. No meningismus  RESP: Normal with no work of breathing, speaking in full sentences. CV: Well perfused. NEURO: Alert with normal mentation, moving extremities spontaneously  PSYCH: Normal mood, normal affect      Medical Decision Making   Patient presents for COVID 19 testing with normal oxygen saturation and mild URI symptoms or COVID 19 exposure. COVID 19 testing was not conducted. The patient was given quarantine/isolation recommendations and agrees with the plan to be discharged home. They were provided instructions to return for difficulty breathing, chest pain, altered mentation, or any other new or worsening symptoms. ED Course:   Initial assessment performed. The patients presenting problems have been discussed, and they are in agreement with the care plan formulated and outlined with them. I have encouraged them to ask questions as they arise throughout their visit. Critical Care Time: None    Disposition:  DISCHARGE NOTE:  The pt is ready for discharge. The pt's signs, symptoms, diagnosis, and discharge instructions have been discussed and pt has conveyed their understanding. The pt is to follow up as recommended or return to ER should their symptoms worsen. Plan has been discussed and pt is in agreement. PLAN:  1. Current Discharge Medication List        2. Follow-up Information    None       3. COVID Testing results will be called once available if positive. Patient should utilize [employer] to access results. 4. Take Tylenol or Ibuprofen as needed  5. Drink plenty of fluids  6.  Return to ED if worse especially if any shortness of breath, chest pain or altered mentation. Diagnosis     Clinical Impression: No diagnosis found. Please note that this dictation was completed with [employer], the computer voice recognition software. Quite often unanticipated grammatical, syntax, homophones, and other interpretive errors are inadvertently transcribed by the computer software. Please disregards these errors. Please excuse any errors that have escaped final proofreading.

## 2022-01-06 NOTE — Clinical Note
[address] [facility] EMERGENCY DEPT  [address] [id number] [phone]    Work/School Note    Date: 1/6/2022     To Whom It May concern: Yamilex Tejada was evaluated by the following provider(s):  Attending Provider: Kely Smallwood MD.   Betsy Samples virus is suspected. Per the CDC guidelines we recommend home isolation until the following conditions are all met:    1. At least five days have passed since symptoms first appeared and/or had a close exposure,   2. After home isolation for five days, wearing a mask around others for the next five days,  3. At least 24 have passed since last fever without the use of fever-reducing medications and  4.  Symptoms (eg cough, shortness of breath) have improved      Sincerely,          Josue Navarro MD

## 2022-02-07 ENCOUNTER — HOSPITAL ENCOUNTER (EMERGENCY)
Dept: VASCULAR SURGERY | Age: 47
Discharge: HOME | End: 2022-02-07
Attending: PHYSICIAN ASSISTANT
Payer: MEDICAID

## 2022-02-07 ENCOUNTER — HOSPITAL ENCOUNTER (EMERGENCY)
Age: 47
Discharge: HOME | End: 2022-02-07
Attending: EMERGENCY MEDICINE
Payer: MEDICAID

## 2022-02-07 ENCOUNTER — APPOINTMENT (OUTPATIENT)
Dept: GENERAL RADIOLOGY | Age: 47
End: 2022-02-07
Attending: PHYSICIAN ASSISTANT
Payer: MEDICAID

## 2022-02-07 VITALS
TEMPERATURE: 98.6 F | DIASTOLIC BLOOD PRESSURE: 87 MMHG | WEIGHT: 275 LBS | OXYGEN SATURATION: 100 % | BODY MASS INDEX: 39.37 KG/M2 | RESPIRATION RATE: 16 BRPM | HEART RATE: 93 BPM | SYSTOLIC BLOOD PRESSURE: 159 MMHG | HEIGHT: 70 IN

## 2022-02-07 DIAGNOSIS — M25.462 EFFUSION OF LEFT KNEE: ICD-10-CM

## 2022-02-07 DIAGNOSIS — M25.562 ACUTE PAIN OF LEFT KNEE: Primary | ICD-10-CM

## 2022-02-07 PROCEDURE — 93971 EXTREMITY STUDY: CPT

## 2022-02-07 PROCEDURE — 99282 EMERGENCY DEPT VISIT SF MDM: CPT

## 2022-02-07 PROCEDURE — 73560 X-RAY EXAM OF KNEE 1 OR 2: CPT

## 2022-02-07 RX ORDER — NAPROXEN 500 MG/1
500 TABLET ORAL 2 TIMES DAILY WITH MEALS
Qty: 20 TABLET | Refills: 0 | Status: SHIPPED | OUTPATIENT
Start: 2022-02-07

## 2022-02-07 NOTE — ED PROVIDER NOTES
EMERGENCY DEPARTMENT HISTORY AND PHYSICAL EXAM    Date: 2/7/2022  Patient Name: Taco Hyatt    History of Presenting Illness     Chief Complaint   Patient presents with    Knee Pain     left knee pain 12/05 meniscus surgery         History Provided By: Patient    Chief Complaint: knee pain  Duration: 1 Months  Timing:  Waxing and Waning  Location: L knee  Quality: Aching  Severity: Moderate  Modifying Factors: worse with flexion of the knee, better with massage  Associated Symptoms: knee pain and swelling     Additional History (Context): Taco Hyatt is a 55 y.o. male with PMH HTN, Diabetes, Hepatitis C, polysubstance abuse, schizophrenia, chondromalacia who presents 2 months post op from arthroscopic L meniscus repair with new onset increased swelling and pain to the L knee. Pt was seen by orthopedic at 2 weeks post op with no noted complications at that time. He has not been seen by orthopedic since. Pt states his knee was healing well without pain for several weeks. Approx. 1 month ago (at ~1 month post op), he noticed increased generalized swelling of the joint associated with localized pain medially. Denies radiation of pain or pain in distal extremity or calf. Pain is worse with flexion of the knee and better with massage around the joint. Takes only HCTZ at home. No additional history was reported at this time. PCP: None    Current Outpatient Medications   Medication Sig Dispense Refill    naproxen (NAPROSYN) 500 mg tablet Take 1 Tablet by mouth two (2) times daily (with meals). 20 Tablet 0    metFORMIN (GLUCOPHAGE) 500 mg tablet Take 500 mg by mouth two (2) times daily (with meals).  chlorthalidone (HYGROTON) 25 mg tablet Take 1 Tablet by mouth daily. 90 Tablet 3    QUEtiapine (SEROQUEL) 50 mg tablet Take 50 mg by mouth nightly.  methadone (METHADONE INTENSOL) 10 mg/mL solution Take 40 mg by mouth daily.          Past History     Past Medical History:  Past Medical History: Diagnosis Date    Anxiety 10/11/2016    At risk of diabetes mellitus 12/18/2019    Chondromalacia     Chronic pain of left knee     Continuous nicotine dependence 10/11/2016    Depression     Diabetes ([facility]     Headache     [facility], [facility] 10/11/2016    Hepatitis C     Hypertension     Insomnia 10/11/2016    Obesity, Class II, BMI 35-39.9 10/11/2016    Paranoid schizophrenia ([facility]     Polysubstance abuse ([facility]     Psychotic disorder (HCC)     Schizophrenia ([facility] 10/11/2016    Sleep apnea     Uses C-pap machine       Past Surgical History:  Past Surgical History:   Procedure Laterality Date    COLONOSCOPY N/A 3/30/2017    COL[facility] Trejo [facility] at [facility] ENDOSCOPY    HX APPENDECTOMY  2000       Family History:  Family History   Problem Relation Age of Onset    Cancer Mother 36        colon    Cancer Maternal Aunt         breast    Diabetes Father        Social History:  Social History     Tobacco Use    Smoking status: Current Every Day Smoker     Packs/day: 0.50     Years: 4.00     Pack years: 2.00    Smokeless tobacco: Never Used   Vaping Use    Vaping Use: Never used   Substance Use Topics    Alcohol use: Not Currently     Comment: rarely     Drug use: Not Currently     Types: Heroin     Comment: [facility]       Allergies: Allergies   Allergen Reactions    Pcn [Penicillins] Hives         Review of Systems   Review of Systems   Constitutional: Negative. Negative for chills and fever. HENT: Negative. Negative for congestion, ear pain and rhinorrhea. Eyes: Negative. Negative for pain and redness. Respiratory: Negative. Negative for cough, shortness of breath, wheezing and stridor. Cardiovascular: Negative. Negative for chest pain and leg swelling. Gastrointestinal: Negative. Negative for abdominal pain, constipation, diarrhea, nausea and vomiting. Genitourinary: Negative.   Negative for dysuria and frequency. Musculoskeletal: Positive for arthralgias and joint swelling. Negative for back pain and neck pain. Skin: Negative. Negative for rash and wound. Neurological: Negative. Negative for dizziness, seizures, syncope and headaches. All other systems reviewed and are negative. All Other Systems Negative  Physical Exam     Vitals:    02/07/22 1153   BP: (!) 159/87   Pulse: 93   Resp: 16   Temp: 98.6 °F (37 °C)   SpO2: 97%   Weight: 124.7 kg (275 lb)   Height: 5' 10\" (1.778 m)     Physical Exam  Constitutional:       General: He is not in acute distress. Appearance: Normal appearance. He is obese. He is not ill-appearing. HENT:      Head: Normocephalic and atraumatic. Mouth/Throat:      Mouth: Mucous membranes are moist.      Pharynx: Oropharynx is clear. Eyes:      General: No scleral icterus. Extraocular Movements: Extraocular movements intact. Conjunctiva/sclera: Conjunctivae normal.   Cardiovascular:      Rate and Rhythm: Normal rate and regular rhythm. Heart sounds: Normal heart sounds. Pulmonary:      Effort: Pulmonary effort is normal. No respiratory distress. Breath sounds: Normal breath sounds. Musculoskeletal:         General: Swelling present. No tenderness. Left lower leg: No edema. Comments: Two well healed arthroscopic scars anterior L knee without sign of infection or drainage. No erythema or warmth. L knee appears moderately swollen. No TTP of medial joint, posteriorly, or into the distal extremity. Knee pain medially is elicited with flexion. Flexion only to 90 degrees. No pain elicited with full extension of the knee. Skin:     General: Skin is warm and dry. Neurological:      General: No focal deficit present. Mental Status: He is alert and oriented to person, place, and time. Mental status is at baseline. Gait: Gait abnormal.      Comments: Slight antalgic gait.   No signs of altered mental status or focal neurologic deficit. Psychiatric:         Mood and Affect: Mood normal.         Behavior: Behavior normal.         Thought Content: Thought content normal.         Judgment: Judgment normal.                Diagnostic Study Results     Labs -   No results found for this or any previous visit (from the past 12 hour(s)). Radiologic Studies -   XR KNEE LT MAX 2 VWS   Final Result   Tricompartment degenerative changes most pronounced within the medial   compartment.   -This has progressed since previous exam.      Large joint effusion. DUPLEX LOWER EXT VENOUS LEFT    (Results Pending)     CT Results  (Last 48 hours)    None        CXR Results  (Last 48 hours)    None            Medical Decision Making   I am the first provider for this patient. I reviewed the vital signs, available nursing notes, past medical history, past surgical history, family history and social history. Vital Signs-Reviewed the patient's vital signs. Records Reviewed: Rebecca Treadwell PA-C     Procedures:  Procedures    Provider Notes (Medical Decision Making): Impression: knee pain and swelling      X-rays negative for fx, arthritic changes noted, large joint effusion   Duplex negative for DVT per PVL tech    Pt made aware of the results from today's visit. Has ortho for follow-up and would prefer to have his orthopedist perform an arthrocentesis given his hx and recent surgery. Will d/c with NSAIDs and recommend ortho follow-up this week. Return precautions advised. Pt agrees. Rebecca Treadwell PA-C     MED RECONCILIATION:  No current facility-administered medications for this encounter. Current Outpatient Medications   Medication Sig    naproxen (NAPROSYN) 500 mg tablet Take 1 Tablet by mouth two (2) times daily (with meals).  metFORMIN (GLUCOPHAGE) 500 mg tablet Take 500 mg by mouth two (2) times daily (with meals).  chlorthalidone (HYGROTON) 25 mg tablet Take 1 Tablet by mouth daily.     QUEtiapine (SEROQUEL) 50 mg tablet Take 50 mg by mouth nightly.  methadone (METHADONE INTENSOL) 10 mg/mL solution Take 40 mg by mouth daily. Disposition:  D/c    DISCHARGE NOTE:   Patient is stable for discharge at this time. I have discussed all the findings from today's work up with the patient, including lab results and imaging. I have answered all questions. Rx for naproxen given. Rest and close follow-up with the orthopedist recommended this week. Return to the ED immediately for any new or worsening symptoms. Rebecca Treadwell PA-C     Follow-up Information     Follow up With Specialties Details Why [address] [place], [place]. In 1 week  [address] [place] [address]  [phone]    [facility] EMERGENCY DEPT Emergency Medicine  As needed, If symptoms worsen [address]  [phone]          Current Discharge Medication List      START taking these medications    Details   naproxen (NAPROSYN) 500 mg tablet Take 1 Tablet by mouth two (2) times daily (with meals). Qty: 20 Tablet, Refills: 0  Start date: 2/7/2022                   Diagnosis     Clinical Impression:   1. Acute pain of left knee    2.  Effusion of left knee

## 2022-02-07 NOTE — DISCHARGE INSTRUCTIONS
[employer] Activation    Thank you for requesting access to [employer]. Please follow the instructions below to securely access and download your online medical record. [employer] allows you to send messages to your doctor, view your test results, renew your prescriptions, schedule appointments, and more. How Do I Sign Up? In your internet browser, go to [email or web address]  Click on the First Time User? Click Here link in the Sign In box. You will be redirect to the New Member Sign Up page. Enter your [employer] Access Code exactly as it appears below. You will not need to use this code after youve completed the sign-up process. If you do not sign up before the expiration date, you must request a new code. [employer] Access Code: Activation code not generated  Current [employer] Status: Active (This is the date your [employer] access code will )    Enter the last four digits of your Social Security Number (xxxx) and Date of Birth (mm/dd/yyyy) as indicated and click Submit. You will be taken to the next sign-up page. Create a [employer] ID. This will be your [employer] login ID and cannot be changed, so think of one that is secure and easy to remember. Create a [employer] password. You can change your password at any time. Enter your Password Reset Question and Answer. This can be used at a later time if you forget your password. Enter your e-mail address. You will receive e-mail notification when new information is available in [address] Click Sign Up. You can now view and download portions of your medical record. Click the [place] link to download a portable copy of your medical information. Additional Information    If you have questions, please visit the Frequently Asked Questions section of the [employer] website at [email or web address] Remember, [employer] is NOT to be used for urgent needs. For medical emergencies, dial 911.

## 2022-03-18 PROBLEM — Z91.89 AT RISK OF DIABETES MELLITUS: Status: ACTIVE | Noted: 2019-12-18

## 2022-03-20 PROBLEM — E66.01 SEVERE OBESITY (HCC): Status: ACTIVE | Noted: 2019-03-27

## 2022-05-02 NOTE — H&P
Patient to apply Chlorhexadine wipes  to surgical area (as instructed) the night before procedure and the AM of procedure. Wipes provided.    Patient instructed to drink 20 ounces (or until full) of Gatorade and it needs to be completed 1 hour (for Main OR patients) or 2 hours (scheduled  section patients) before given arrival time for procedure (NO RED Gatorade)    Patient verbalized understanding.    An arrival time for procedure was not given during PAT visit. If patient had any questions or concerns about their arrival time, they were instructed to contact their surgeon/physician.  Additionally, if the patient referred to an arrival time that was acquired from their my chart account, patient was encouraged to verify that time with their surgeon/physician.  NO arrival times given in Pre Admission Testing Department.    COVID in PAT today    Patient viewed general PAT education video as instructed in their preoperative information received from their surgeon.  Patient stated the general PAT education video was viewed in its entirety and survey completed.  Copies of PAT general education handouts (Incentive Spirometry, Meds to Beds Program, Patient Belongings, Pre-op skin preparation instructions, Blood Glucose testing, Visitor policy, Surgery FAQ, Code H) distributed to patient if not printed. Education related to the PAT pass and skin preparation for surgery (if applicable) completed in PAT as a reinforcement to PAT education video. Patient instructed to return PAT pass provided today as well as completed skin preparation sheet (if applicable) on the day of procedure.     Additionally if patient had not viewed video yet but intended to view it at home or in our waiting area, then referred them to the handout with QR code/link provided during PAT visit.  Instructed patient to complete survey after viewing the video in its entirety.  Encouraged patient/family to read PAT general education handouts  [email or web address]  [phone]      Impression:   1. Average risk colon cancer screening exam      Plan:     1. Colonoscopy      Chief Complaint: Average risk colon cancer screening exam.      HPI:  Aj Amor is a 39 y.o. male who is being seen on consult for average risk colon cancer screening with colonoscopy    PMH:   Past Medical History:   Diagnosis Date    Anxiety 10/11/2016    Continuous nicotine dependence 10/11/2016    Depression     Headache     [facility] ([facility] 10/11/2016    Hepatitis C     Insomnia 10/11/2016    Obesity, Class II, BMI 35-39.9 ([facility] 10/11/2016    Paranoid schizophrenia ([facility]     Psychotic disorder     Schizophrenia ([facility] 10/11/2016       PSH:   Past Surgical History:   Procedure Laterality Date    HX APPENDECTOMY  2000       Social HX:   Social History     Social History    Marital status: LEGALLY      Spouse name: N/A    Number of children: N/A    Years of education: N/A     Occupational History    Not on file. Social History Main Topics    Smoking status: Current Every Day Smoker     Packs/day: 0.00     Years: 10.00    Smokeless tobacco: Never Used      Comment: vapor (18mg nicotine)    Alcohol use No    Drug use: No      Comment: [facility]    Sexual activity: Yes     Partners: Female     Other Topics Concern    Not on file     Social History Narrative       FHX:   Family History   Problem Relation Age of Onset    Cancer Mother 36     colon    Cancer Maternal Aunt      breast    Diabetes Father        Allergy:   Allergies   Allergen Reactions    Pcn [Penicillins] Hives       Home Medications:     Prescriptions Prior to Admission   Medication Sig    acetaminophen (TYLENOL EXTRA STRENGTH) 500 mg tablet Take 2 Tabs by mouth every six (6) hours as needed for Pain.  ARIPiprazole (ABILIFY) 5 mg tablet Take 10 mg by mouth daily.     benztropine (COGENTIN) 1 mg tablet Take 1 mg by mouth two (2) times a day.    sertraline (ZOLOFT) 50 mg tablet Take 50 mg by mouth daily.  methadone (DOLOPHINE) 10 mg tablet Take 60 mg by mouth daily. Review of Systems:     Constitutional: No fevers, chills, weight loss, fatigue. Skin: No rashes, pruritis, jaundice, ulcerations, erythema. HENT: No headaches, nosebleeds, sinus pressure, rhinorrhea, sore throat. Eyes: No visual changes, blurred vision, eye pain, photophobia, jaundice. Cardiovascular: No chest pain, heart palpitations. Respiratory: No cough, SOB, wheezing, chest discomfort, orthopnea. Gastrointestinal:    Genitourinary: No dysuria, bleeding, discharge, pyuria. Musculoskeletal: No weakness, arthralgias, wasting. Endo: No sweats. Heme: No bruising, easy bleeding. Allergies: As noted. Neurological: Cranial nerves intact. Alert and oriented. Gait not assessed. Psychiatric:  No anxiety, depression, hallucinations. Visit Vitals    /90    Pulse (!) 110    Temp 98.4 °F (36.9 °C)    Resp 14    Ht 5' 10\" (1.778 m)    Wt 113.4 kg (250 lb)    SpO2 100%    BMI 35.87 kg/m2       Physical Assessment:     constitutional: appearance: well developed, well nourished, normal habitus, no deformities, in no acute distress. skin: inspection: no rashes, ulcers, icterus or other lesions; no clubbing or telangiectasias. palpation: no induration or subcutaneos nodules. eyes: inspection: normal conjunctivae and lids; no jaundice pupils: symmetrical, normoreactive to light, normal accommodation and size. ENMT: mouth: normal oral mucosa,lips and gums; good dentition. oropharynx: normal tongue, hard and soft palate; posterior pharynx without erithema, exudate or lesions. neck: thyroid: normal size, consistency and position; no masses or tenderness. respiratory: effort: normal chest excursion; no intercostal retraction or accessory muscle use. cardiovascular: abdominal aorta: normal size and position; no bruits.  palpation: PMI thoroughly and notify PAT staff with any questions or concerns. Patient verbalized understanding of all information and priority content.     of normal size and position; normal rhythm; no thrill or murmurs. abdominal: abdomen: normal consistency; no tenderness or masses. hernias: no hernias appreciated. liver: normal size and consistency. spleen: not palpable. rectal: hemoccult/guaiac: not performed. musculoskeletal: digits and nails: no clubbing, cyanosis, petechiae or other inflammatory conditions. gait: normal gait and station head and neck: normal range of motion; no pain, crepitation or contracture. spine/ribs/pelvis: normal range of motion; no pain, deformity or contracture. lymphatic: axilae: not palpable. groin: not palpable. neck: within normal limits. other: not palpable. neurologic: cranial nerves: II-XII normal.   psychiatric: judgement/insight: within normal limits. memory: within normal limits for recent and remote events. mood and affect: no evidence of depression, anxiety or agitation. orientation: oriented to time, space and person. Basic Metabolic Profile   No results for input(s): NA, K, CL, CO2, BUN, GLU, CA, MG, PHOS in the last 72 hours. No lab exists for component: CREAT      CBC w/Diff    No results for input(s): WBC, RBC, HGB, HCT, MCV, MCH, MCHC, RDW, PLT, HGBEXT, HCTEXT, PLTEXT in the last 72 hours. No lab exists for component: MPV No results for input(s): GRANS, LYMPH, EOS, PRO, MYELO, METAS, BLAST in the last 72 hours. No lab exists for component: MONO, BASO     Hepatic Function   No results for input(s): ALB, TP, TBILI, GPT, SGOT, AP, AML, LPSE in the last 72 hours. No lab exists for component: Willow Miller MD, M.D. [facility], [address] [place] [address]  [email or web address]

## 2022-08-23 ENCOUNTER — APPOINTMENT (OUTPATIENT)
Dept: GENERAL RADIOLOGY | Age: 47
End: 2022-08-23
Attending: STUDENT IN AN ORGANIZED HEALTH CARE EDUCATION/TRAINING PROGRAM
Payer: MEDICAID

## 2022-08-23 ENCOUNTER — HOSPITAL ENCOUNTER (EMERGENCY)
Age: 47
Discharge: HOME | End: 2022-08-23
Attending: STUDENT IN AN ORGANIZED HEALTH CARE EDUCATION/TRAINING PROGRAM
Payer: MEDICAID

## 2022-08-23 VITALS
OXYGEN SATURATION: 100 % | DIASTOLIC BLOOD PRESSURE: 89 MMHG | RESPIRATION RATE: 18 BRPM | SYSTOLIC BLOOD PRESSURE: 136 MMHG | HEART RATE: 86 BPM | BODY MASS INDEX: 39.8 KG/M2 | HEIGHT: 70 IN | TEMPERATURE: 98.1 F | WEIGHT: 278 LBS

## 2022-08-23 DIAGNOSIS — Z72.0 TOBACCO USE: ICD-10-CM

## 2022-08-23 DIAGNOSIS — F11.93 OPIOID WITHDRAWAL (HCC): Primary | ICD-10-CM

## 2022-08-23 LAB
ALBUMIN SERPL-MCNC: 3.6 G/DL (ref 3.4–5)
ALBUMIN/GLOB SERPL: 0.8 {RATIO} (ref 0.8–1.7)
ALP SERPL-CCNC: 69 U/L (ref 45–117)
ALT SERPL-CCNC: 40 U/L (ref 16–61)
ANION GAP SERPL CALC-SCNC: 7 MMOL/L (ref 3–18)
AST SERPL-CCNC: 30 U/L (ref 10–38)
ATRIAL RATE: 85 BPM
BASOPHILS # BLD: 0 K/UL (ref 0–0.1)
BASOPHILS NFR BLD: 0 % (ref 0–2)
BILIRUB SERPL-MCNC: 0.4 MG/DL (ref 0.2–1)
BUN SERPL-MCNC: 9 MG/DL (ref 7–18)
BUN/CREAT SERPL: 8 (ref 12–20)
CALCIUM SERPL-MCNC: 9.2 MG/DL (ref 8.5–10.1)
CALCULATED P AXIS, ECG09: 48 DEGREES
CALCULATED R AXIS, ECG10: 0 DEGREES
CALCULATED T AXIS, ECG11: -19 DEGREES
CHLORIDE SERPL-SCNC: 106 MMOL/L (ref 100–111)
CO2 SERPL-SCNC: 28 MMOL/L (ref 21–32)
CREAT SERPL-MCNC: 1.06 MG/DL (ref 0.6–1.3)
DIAGNOSIS, 93000: NORMAL
DIFFERENTIAL METHOD BLD: ABNORMAL
EOSINOPHIL # BLD: 0.1 K/UL (ref 0–0.4)
EOSINOPHIL NFR BLD: 1 % (ref 0–5)
ERYTHROCYTE [DISTWIDTH] IN BLOOD BY AUTOMATED COUNT: 14.3 % (ref 11.6–14.5)
GLOBULIN SER CALC-MCNC: 4.3 G/DL (ref 2–4)
GLUCOSE SERPL-MCNC: 171 MG/DL (ref 74–99)
HCT VFR BLD AUTO: 43.2 % (ref 36–48)
HGB BLD-MCNC: 14.4 G/DL (ref 13–16)
IMM GRANULOCYTES # BLD AUTO: 0 K/UL (ref 0–0.04)
IMM GRANULOCYTES NFR BLD AUTO: 0 % (ref 0–0.5)
LIPASE SERPL-CCNC: 62 U/L (ref 73–393)
LYMPHOCYTES # BLD: 1.9 K/UL (ref 0.9–3.6)
LYMPHOCYTES NFR BLD: 24 % (ref 21–52)
MCH RBC QN AUTO: 25.9 PG (ref 24–34)
MCHC RBC AUTO-ENTMCNC: 33.3 G/DL (ref 31–37)
MCV RBC AUTO: 77.6 FL (ref 78–100)
MONOCYTES # BLD: 0.4 K/UL (ref 0.05–1.2)
MONOCYTES NFR BLD: 6 % (ref 3–10)
NEUTS SEG # BLD: 5.4 K/UL (ref 1.8–8)
NEUTS SEG NFR BLD: 69 % (ref 40–73)
NRBC # BLD: 0 K/UL (ref 0–0.01)
NRBC BLD-RTO: 0 PER 100 WBC
P-R INTERVAL, ECG05: 154 MS
PLATELET # BLD AUTO: 243 K/UL (ref 135–420)
PMV BLD AUTO: 8.7 FL (ref 9.2–11.8)
POTASSIUM SERPL-SCNC: 3.4 MMOL/L (ref 3.5–5.5)
PROT SERPL-MCNC: 7.9 G/DL (ref 6.4–8.2)
Q-T INTERVAL, ECG07: 384 MS
QRS DURATION, ECG06: 80 MS
QTC CALCULATION (BEZET), ECG08: 456 MS
RBC # BLD AUTO: 5.57 M/UL (ref 4.35–5.65)
SODIUM SERPL-SCNC: 141 MMOL/L (ref 136–145)
TROPONIN-HIGH SENSITIVITY: 4 NG/L (ref 0–78)
VENTRICULAR RATE, ECG03: 85 BPM
WBC # BLD AUTO: 7.8 K/UL (ref 4.6–13.2)

## 2022-08-23 PROCEDURE — 83690 ASSAY OF LIPASE: CPT

## 2022-08-23 PROCEDURE — 96374 THER/PROPH/DIAG INJ IV PUSH: CPT

## 2022-08-23 PROCEDURE — 99285 EMERGENCY DEPT VISIT HI MDM: CPT

## 2022-08-23 PROCEDURE — 74011250636 HC RX REV CODE- 250/636: Performed by: STUDENT IN AN ORGANIZED HEALTH CARE EDUCATION/TRAINING PROGRAM

## 2022-08-23 PROCEDURE — 74011250637 HC RX REV CODE- 250/637: Performed by: STUDENT IN AN ORGANIZED HEALTH CARE EDUCATION/TRAINING PROGRAM

## 2022-08-23 PROCEDURE — 96375 TX/PRO/DX INJ NEW DRUG ADDON: CPT

## 2022-08-23 PROCEDURE — 80053 COMPREHEN METABOLIC PANEL: CPT

## 2022-08-23 PROCEDURE — 71045 X-RAY EXAM CHEST 1 VIEW: CPT

## 2022-08-23 PROCEDURE — 84484 ASSAY OF TROPONIN QUANT: CPT

## 2022-08-23 PROCEDURE — 85025 COMPLETE CBC W/AUTO DIFF WBC: CPT

## 2022-08-23 PROCEDURE — 96361 HYDRATE IV INFUSION ADD-ON: CPT

## 2022-08-23 PROCEDURE — 93005 ELECTROCARDIOGRAM TRACING: CPT

## 2022-08-23 RX ORDER — BUPRENORPHINE HYDROCHLORIDE AND NALOXONE HYDROCHLORIDE DIHYDRATE 8; 2 MG/1; MG/1
1 TABLET SUBLINGUAL ONCE
Status: COMPLETED | OUTPATIENT
Start: 2022-08-23 | End: 2022-08-23

## 2022-08-23 RX ORDER — BUPRENORPHINE AND NALOXONE 8; 2 MG/1; MG/1
1 FILM, SOLUBLE BUCCAL; SUBLINGUAL DAILY
Qty: 7 FILM | Refills: 0 | Status: SHIPPED | OUTPATIENT
Start: 2022-08-23 | End: 2022-08-30

## 2022-08-23 RX ORDER — DICYCLOMINE HYDROCHLORIDE 10 MG/5ML
20 SOLUTION ORAL 4 TIMES DAILY
Qty: 280 ML | Refills: 0 | Status: SHIPPED | OUTPATIENT
Start: 2022-08-23 | End: 2022-08-30

## 2022-08-23 RX ORDER — ONDANSETRON 4 MG/1
4 TABLET, ORALLY DISINTEGRATING ORAL
Qty: 30 TABLET | Refills: 0 | Status: SHIPPED | OUTPATIENT
Start: 2022-08-23

## 2022-08-23 RX ORDER — HALOPERIDOL 5 MG/ML
3 INJECTION INTRAMUSCULAR
Status: COMPLETED | OUTPATIENT
Start: 2022-08-23 | End: 2022-08-23

## 2022-08-23 RX ORDER — DIPHENHYDRAMINE HYDROCHLORIDE 50 MG/ML
25 INJECTION, SOLUTION INTRAMUSCULAR; INTRAVENOUS
Status: COMPLETED | OUTPATIENT
Start: 2022-08-23 | End: 2022-08-23

## 2022-08-23 RX ORDER — KETOROLAC TROMETHAMINE 15 MG/ML
15 INJECTION, SOLUTION INTRAMUSCULAR; INTRAVENOUS ONCE
Status: COMPLETED | OUTPATIENT
Start: 2022-08-23 | End: 2022-08-23

## 2022-08-23 RX ORDER — ONDANSETRON 2 MG/ML
4 INJECTION INTRAMUSCULAR; INTRAVENOUS ONCE
Status: DISCONTINUED | OUTPATIENT
Start: 2022-08-23 | End: 2022-08-23

## 2022-08-23 RX ORDER — LOPERAMIDE HYDROCHLORIDE 2 MG/1
2 CAPSULE ORAL ONCE
Status: COMPLETED | OUTPATIENT
Start: 2022-08-23 | End: 2022-08-23

## 2022-08-23 RX ADMIN — DIPHENHYDRAMINE HYDROCHLORIDE 25 MG: 50 INJECTION, SOLUTION INTRAMUSCULAR; INTRAVENOUS at 15:05

## 2022-08-23 RX ADMIN — LOPERAMIDE HYDROCHLORIDE 2 MG: 2 CAPSULE ORAL at 15:07

## 2022-08-23 RX ADMIN — KETOROLAC TROMETHAMINE 15 MG: 15 INJECTION, SOLUTION INTRAMUSCULAR; INTRAVENOUS at 15:07

## 2022-08-23 RX ADMIN — HALOPERIDOL LACTATE 3 MG: 5 INJECTION, SOLUTION INTRAMUSCULAR at 15:06

## 2022-08-23 RX ADMIN — SODIUM CHLORIDE 1000 ML: 9 INJECTION, SOLUTION INTRAVENOUS at 15:20

## 2022-08-23 RX ADMIN — BUPRENORPHINE AND NALOXONE 1 TABLET: 8; 2 TABLET SUBLINGUAL at 15:07

## 2022-08-23 NOTE — ED TRIAGE NOTES
53 yo M to ED for CP and abd pain that started about 45 min pta. Says it started after he took suboxone, report he took it because he was going through withdrawal from fentanyl.  Pt is throwing his body around in wheelchair,

## 2022-08-23 NOTE — ED PROVIDER NOTES
Emergency Department Treatment Report        Patient: Aj Samuels Age: 52 y.o. Sex: male    YOB: 1975 Admit Date: 8/23/2022 PCP: None   MRN: [id number]  CSN: [id number]     Room: YUNG/YUNG Time Dictated: 2:14 PM      Chief Complaint   Chief Complaint   Patient presents with    Chest Pain    Abdominal Pain       History of Present Illness   52 y.o. male w/ hx of anxiety, depression, Hep C, HTN, paranoid schizophrenia, polysubstance abuse, and sleep apnea on CPAP presenting for chest pain and abdominal pain. Took 1/4 of a suboxone tab this morning for fentanyl withdrawal symptoms with no relief. Pain is described as crampy, intermittent, and rated an 8/10. Associated nausea, vomiting, diarrhea, and headache. Last used fentanyl yesterday. Denies other drug use. No recent alcohol use. Current tobacco smoker. Review of Systems   Review of Systems   Constitutional:  Positive for malaise/fatigue. Negative for chills and fever. HENT:  Negative for congestion and sore throat. Eyes:  Negative for blurred vision and double vision. Respiratory:  Positive for shortness of breath. Negative for cough. Cardiovascular:  Positive for chest pain. Negative for leg swelling. Gastrointestinal:  Positive for abdominal pain, diarrhea, nausea and vomiting. Negative for blood in stool. Genitourinary:  Negative for dysuria and hematuria. Musculoskeletal:  Negative for myalgias. Skin:  Negative for rash. Neurological:  Negative for loss of consciousness and headaches. Psychiatric/Behavioral:  Positive for substance abuse.       Past Medical/Surgical History     Past Medical History:   Diagnosis Date    Anxiety 10/11/2016    At risk of diabetes mellitus 12/18/2019    Chondromalacia     Chronic pain of left knee     Continuous nicotine dependence 10/11/2016    Depression     Diabetes ([facility] [address]     Headache     Hep C w/o coma, chronic ([facility] [address] 10/11/2016    Hepatitis C     Hypertension     Insomnia 10/11/2016 Obesity, Class II, BMI 35-39.9 10/11/2016    Paranoid schizophrenia ([facility] [address]     Polysubstance abuse ([facility] [address]     Psychotic disorder ([facility] [address]     Schizophrenia ([address] 10/11/2016    Sleep apnea     Uses C-pap machine     Past Surgical History:   Procedure Laterality Date    COLONOSCOPY N/A 3/30/2017    COLONOSCOPY performed by Garett Manning MD at [facility] ENDOSCOPY    HX APPENDECTOMY  2000       Social History     Social History     Socioeconomic History    Marital status:      Spouse name: Not on file    Number of children: Not on file    Years of education: Not on file    Highest education level: Not on file   Occupational History    Not on file   Tobacco Use    Smoking status: Every Day     Packs/day: 0.50     Years: 4.00     Pack years: 2.00     Types: Cigarettes    Smokeless tobacco: Never   Vaping Use    Vaping Use: Never used   Substance and Sexual Activity    Alcohol use: Not Currently     Comment: rarely     Drug use: Not Currently     Types: Heroin     Comment: [facility]in: [facility]    Sexual activity: Yes     Partners: Female   Other Topics Concern    Not on file   Social History Narrative    Not on file     Social Determinants of Health     Financial Resource Strain: Not on file   Food Insecurity: Not on file   Transportation Needs: Not on file   Physical Activity: Not on file   Stress: Not on file   Social Connections: Not on file   Intimate Partner Violence: Not on file   Housing Stability: Not on file       Family History     Family History   Problem Relation Age of Onset    Cancer Mother 36        colon    Cancer Maternal Aunt         breast    Diabetes Father        Current Medications     Prior to Admission Medications   Prescriptions Last Dose Informant Patient Reported? Taking? QUEtiapine (SEROQUEL) 50 mg tablet   Yes No   Sig: Take 50 mg by mouth nightly. metFORMIN (GLUCOPHAGE) 500 mg tablet   Yes No   Sig: Take 500 mg by mouth two (2) times daily (with meals).    methadone (METHADONE INTENSOL) 10 mg/mL solution   Yes No   Sig: Take 40 mg by mouth daily. naproxen (NAPROSYN) 500 mg tablet   No No   Sig: Take 1 Tablet by mouth two (2) times daily (with meals). Facility-Administered Medications: None       Allergies     Allergies   Allergen Reactions    Pcn [Penicillins] Hives       Physical Exam     ED Triage Vitals   ED Encounter Vitals Group      BP --       Pulse (Heart Rate) 08/23/22 1400 86      Resp Rate 08/23/22 1359 18      Temp 08/23/22 1359 98.1 °F (36.7 °C)      Temp src --       O2 Sat (%) 08/23/22 1400 100 %      Weight 08/23/22 1410 278 lb      Height 08/23/22 1410 5' 10\"        Physical Exam  Vitals and nursing note reviewed. Constitutional:       General: He is not in acute distress. Appearance: Normal appearance. He is obese. He is ill-appearing and diaphoretic. He is not toxic-appearing. HENT:      Head: Normocephalic and atraumatic. Nose: Nose normal.      Mouth/Throat:      Mouth: Mucous membranes are moist.   Eyes:      Extraocular Movements: Extraocular movements intact. Conjunctiva/sclera: Conjunctivae normal.      Pupils: Pupils are equal, round, and reactive to light. Cardiovascular:      Rate and Rhythm: Normal rate and regular rhythm. Pulses: Normal pulses. Heart sounds: Normal heart sounds. Pulmonary:      Effort: Pulmonary effort is normal.      Breath sounds: Normal breath sounds. Abdominal:      General: Abdomen is flat. Palpations: Abdomen is soft. Tenderness: There is no abdominal tenderness. There is no guarding or rebound. Musculoskeletal:         General: Normal range of motion. Cervical back: Normal range of motion and neck supple. Right lower leg: No edema. Left lower leg: No edema. Skin:     General: Skin is warm. Capillary Refill: Capillary refill takes less than 2 seconds. Neurological:      General: No focal deficit present.       Mental Status: He is alert and oriented to person, place, and time. Sensory: No sensory deficit. Motor: No weakness. Psychiatric:         Mood and Affect: Mood normal.         Behavior: Behavior normal.        Diagnostic Studies   Lab:   Recent Results (from the past 12 hour(s))   EKG, 12 LEAD, INITIAL    Collection Time: 08/23/22  1:58 PM   Result Value Ref Range    Ventricular Rate 85 BPM    Atrial Rate 85 BPM    P-R Interval 154 ms    QRS Duration 80 ms    Q-T Interval 384 ms    QTC Calculation (Bezet) 456 ms    Calculated P Axis 48 degrees    Calculated R Axis 0 degrees    Calculated T Axis -19 degrees    Diagnosis       Poor data quality, interpretation may be adversely affected  Normal sinus rhythm  Nonspecific T wave abnormality  Abnormal ECG  When compared with ECG of 10-DELFINA-2021 14:34,  No significant change was found  Confirmed by Rigoberto Hanna ([id number]) on 8/23/2022 3:30:11 PM     CBC WITH AUTOMATED DIFF    Collection Time: 08/23/22  2:00 PM   Result Value Ref Range    WBC 7.8 4.6 - 13.2 K/uL    RBC 5.57 4.35 - 5.65 M/uL    HGB 14.4 13.0 - 16.0 g/dL    HCT 43.2 36.0 - 48.0 %    MCV 77.6 (L) 78.0 - 100.0 FL    MCH 25.9 24.0 - 34.0 PG    MCHC 33.3 31.0 - 37.0 g/dL    RDW 14.3 11.6 - 14.5 %    PLATELET 098 202 - 333 K/uL    MPV 8.7 (L) 9.2 - 11.8 FL    NRBC 0.0 0  WBC    ABSOLUTE NRBC 0.00 0.00 - 0.01 K/uL    NEUTROPHILS 69 40 - 73 %    LYMPHOCYTES 24 21 - 52 %    MONOCYTES 6 3 - 10 %    EOSINOPHILS 1 0 - 5 %    BASOPHILS 0 0 - 2 %    IMMATURE GRANULOCYTES 0 0.0 - 0.5 %    ABS. NEUTROPHILS 5.4 1.8 - 8.0 K/UL    ABS. LYMPHOCYTES 1.9 0.9 - 3.6 K/UL    ABS. MONOCYTES 0.4 0.05 - 1.2 K/UL    ABS. EOSINOPHILS 0.1 0.0 - 0.4 K/UL    ABS. BASOPHILS 0.0 0.0 - 0.1 K/UL    ABS. IMM.  GRANS. 0.0 0.00 - 0.04 K/UL    DF AUTOMATED     METABOLIC PANEL, COMPREHENSIVE    Collection Time: 08/23/22  2:00 PM   Result Value Ref Range    Sodium 141 136 - 145 mmol/L    Potassium 3.4 (L) 3.5 - 5.5 mmol/L    Chloride 106 100 - 111 mmol/L    CO2 28 21 - 32 mmol/L    Anion gap 7 3.0 - 18 mmol/L    Glucose 171 (H) 74 - 99 mg/dL    BUN 9 7.0 - 18 MG/DL    Creatinine 1.06 0.6 - 1.3 MG/DL    BUN/Creatinine ratio 8 (L) 12 - 20      GFR est AA >60 >60 ml/min/1.73m2    GFR est non-AA >60 >60 ml/min/1.73m2    Calcium 9.2 8.5 - 10.1 MG/DL    Bilirubin, total 0.4 0.2 - 1.0 MG/DL    ALT (SGPT) 40 16 - 61 U/L    AST (SGOT) 30 10 - 38 U/L    Alk. phosphatase 69 45 - 117 U/L    Protein, total 7.9 6.4 - 8.2 g/dL    Albumin 3.6 3.4 - 5.0 g/dL    Globulin 4.3 (H) 2.0 - 4.0 g/dL    A-G Ratio 0.8 0.8 - 1.7     LIPASE    Collection Time: 08/23/22  2:00 PM   Result Value Ref Range    Lipase 62 (L) 73 - 393 U/L   TROPONIN-HIGH SENSITIVITY    Collection Time: 08/23/22  3:25 PM   Result Value Ref Range    Troponin-High Sensitivity 4 0 - 78 ng/L     Labs Reviewed   CBC WITH AUTOMATED DIFF - Abnormal; Notable for the following components:       Result Value    MCV 77.6 (*)     MPV 8.7 (*)     All other components within normal limits   METABOLIC PANEL, COMPREHENSIVE - Abnormal; Notable for the following components:    Potassium 3.4 (*)     Glucose 171 (*)     BUN/Creatinine ratio 8 (*)     Globulin 4.3 (*)     All other components within normal limits   LIPASE - Abnormal; Notable for the following components:    Lipase 62 (*)     All other components within normal limits   TROPONIN-HIGH SENSITIVITY       Imaging:    XR CHEST PORT    Result Date: 8/23/2022  EXAM: XR CHEST PORT INDICATION: chest pain COMPARISON: 11/30/2020. FINDINGS: A single view of the chest demonstrates clear lungs. The cardiac and mediastinal contours and pulmonary vascularity are normal. The bones and soft tissues are within normal limits. No acute findings. Ekg as interpreted by me shows normal sinus rhythm w/ T wave flattening in lateral leads, unchanged from prior tracing. ED Course/MDM     51 yo male w/ hx of fentanyl abuse presenting w/ chest pain and abdominal pain.      Afebrile and hemodynamically stable. Ddx: opioid withdrawal, ACS, gastroenteritis, pneumothorax. EKG reassuring. Labs unremarkable, including negative troponin. CXR unremarkable. Given IVFs, suboxone, benadryl, haldol, and toradol with significant improvement of symptoms. Sleeping peacefully on re-evaluation. Appropriate for discharge w/ substance abuse resources and referral to suboxone clinics. Strict return precautions given. ED Course as of 08/23/22 2124   Tue Aug 23, 2022   1634 Resting comfortably in bed. Patient no longer having any abdominal pain. Troponin negative. The wife relates that the patient has been having a very hard time as of recently due to a recent family member's death [RS]      ED Course User Index  [RS] Geronimo Salinas MD         Procedures    Final Diagnosis       ICD-10-CM ICD-9-CM   1. Opioid withdrawal (HCC)  F11.23 292.0     304.00   2. Tobacco use  Z72.0 305.1       Disposition   discharge    The patient was personally evaluated by myself and Dr. Bran Villalobos who agrees with the above assessment and plan. DO Anatoliy Hunt , PGY-4  August 23, 2022    My signature above authenticates this document and my orders, the final    diagnosis (es), discharge prescription (s), and instructions in the Epic    record. If you have any questions please contact [phone]. Nursing notes have been reviewed by the physician    Tony medical dictation software was used for portions of this report. Unintended voice recognition errors may occur.

## 2022-08-23 NOTE — DISCHARGE INSTRUCTIONS
Suboxone Programs in [address] Paul Beasley MD-  [facility] :  Phone [phone]    [address] [place] [address] Fax: [phone], phone: [phone]    [place] Fax: [phone], phone: [phone]    [place]:   [address] [phone]  [facility]- [address] [place] [address] Drive- [address] - [phone]     [address]-  Fax: [phone], phone [phone] or [phone]    [place]- [phone]

## 2022-08-25 NOTE — DISCHARGE INSTRUCTIONS
Sore Throat: Care Instructions  Your Care Instructions    Infection by bacteria or a virus causes most sore throats. Cigarette smoke, dry air, air pollution, allergies, and yelling can also cause a sore throat. Sore throats can be painful and annoying. Fortunately, most sore throats go away on their own. If you have a bacterial infection, your doctor may prescribe antibiotics. Follow-up care is a key part of your treatment and safety. Be sure to make and go to all appointments, and call your doctor if you are having problems. It's also a good idea to know your test results and keep a list of the medicines you take. How can you care for yourself at home? · If your doctor prescribed antibiotics, take them as directed. Do not stop taking them just because you feel better. You need to take the full course of antibiotics. · Gargle with warm salt water once an hour to help reduce swelling and relieve discomfort. Use 1 teaspoon of salt mixed in 1 cup of warm water. · Take an over-the-counter pain medicine, such as acetaminophen (Tylenol), ibuprofen (Advil, Motrin), or naproxen (Aleve). Read and follow all instructions on the label. · Be careful when taking over-the-counter cold or flu medicines and Tylenol at the same time. Many of these medicines have acetaminophen, which is Tylenol. Read the labels to make sure that you are not taking more than the recommended dose. Too much acetaminophen (Tylenol) can be harmful. · Drink plenty of fluids. Fluids may help soothe an irritated throat. Hot fluids, such as tea or soup, may help decrease throat pain. · Use over-the-counter throat lozenges to soothe pain. Regular cough drops or hard candy may also help. These should not be given to young children because of the risk of choking. · Do not smoke or allow others to smoke around you. If you need help quitting, talk to your doctor about stop-smoking programs and medicines.  These can increase your chances of quitting for good. · Use a vaporizer or humidifier to add moisture to your bedroom. Follow the directions for cleaning the machine. When should you call for help? Call your doctor now or seek immediate medical care if:  · You have new or worse trouble swallowing. · Your sore throat gets much worse on one side. Watch closely for changes in your health, and be sure to contact your doctor if you do not get better as expected. Where can you learn more? Go to http://laina-taylor.info/. Enter [phone] [id number] [phone] in the search box to learn more about \"Sore Throat: Care Instructions. \"  Current as of: July 29, 2016  Content Version: 11.2  © 5678-7073 [employer]. Care instructions adapted under license by [employer] (which disclaims liability or warranty for this information). If you have questions about a medical condition or this instruction, always ask your healthcare professional. [place] [address] any warranty or liability for your use of this information. Musculoskeletal Chest Pain: Care Instructions  Your Care Instructions  Chest pain is not always a sign that something is wrong with your heart or that you have another serious problem. The doctor thinks your chest pain is caused by strained muscles or ligaments, inflamed chest cartilage, or another problem in your chest, rather than by your heart. You may need more tests to find the cause of your chest pain. Follow-up care is a key part of your treatment and safety. Be sure to make and go to all appointments, and call your doctor if you are having problems. Its also a good idea to know your test results and keep a list of the medicines you take. How can you care for yourself at home? · Take pain medicines exactly as directed. ¨ If the doctor gave you a prescription medicine for pain, take it as prescribed.   ¨ If you are not taking a prescription pain medicine, ask your doctor if you can take an over-the-counter medicine. · Rest and protect the sore area. · Stop, change, or take a break from any activity that may be causing your pain or soreness. · Put ice or a cold pack on the sore area for 10 to 20 minutes at a time. Try to do this every 1 to 2 hours for the next 3 days (when you are awake) or until the swelling goes down. Put a thin cloth between the ice and your skin. · After 2 or 3 days, apply a heating pad set on low or a warm cloth to the area that hurts. Some doctors suggest that you go back and forth between hot and cold. · Do not wrap or tape your ribs for support. This may cause you to take smaller breaths, which could increase your risk of lung problems. · Mentholated creams such as Bengay or Icy Hot may soothe sore muscles. Follow the instructions on the package. · Follow your doctor's instructions for exercising. · Gentle stretching and massage may help you get better faster. Stretch slowly to the point just before pain begins, and hold the stretch for at least 15 to 30 seconds. Do this 3 or 4 times a day. Stretch just after you have applied heat. · As your pain gets better, slowly return to your normal activities. Any increased pain may be a sign that you need to rest a while longer. When should you call for help? Call 911 anytime you think you may need emergency care. For example, call if:  · You have chest pain or pressure. This may occur with:  ¨ Sweating. ¨ Shortness of breath. ¨ Nausea or vomiting. ¨ Pain that spreads from the chest to the neck, jaw, or one or both shoulders or arms. ¨ Dizziness or lightheadedness. ¨ A fast or uneven pulse. After calling 911, chew 1 adult-strength aspirin. Wait for an ambulance. Do not try to drive yourself. · You have sudden chest pain and shortness of breath, or you cough up blood. Call your doctor now or seek immediate medical care if:  · You have any trouble breathing. · Your chest pain gets worse.   · Your chest pain occurs consistently with exercise and is relieved by rest.  Watch closely for changes in your health, and be sure to contact your doctor if:  · Your chest pain does not get better after 1 week. Where can you learn more? Go to http://laina-taylor.info/. Enter V293 in the search box to learn more about \"Musculoskeletal Chest Pain: Care Instructions. \"  Current as of: May 27, 2016  Content Version: 11.2  © 4577-4488 [employer]. Care instructions adapted under license by [employer] (which disclaims liability or warranty for this information). If you have questions about a medical condition or this instruction, always ask your healthcare professional. [address] any warranty or liability for your use of this information. XPlace Activation    Thank you for requesting access to [employer]Place. Please follow the instructions below to securely access and download your online medical record. XPlace allows you to send messages to your doctor, view your test results, renew your prescriptions, schedule appointments, and more. How Do I Sign Up? 1. In your internet browser, go to [email or web address]  2. Click on the First Time User? Click Here link in the Sign In box. You will be redirect to the New Member Sign Up page. 3. Enter your XPlace Access Code exactly as it appears below. You will not need to use this code after youve completed the sign-up process. If you do not sign up before the expiration date, you must request a new code. XPlace Access Code: [email or web address]  Expires: 2017 11:19 PM (This is the date your XPlace access code will )    4. Enter the last four digits of your Social Security Number (xxxx) and Date of Birth (mm/dd/yyyy) as indicated and click Submit. You will be taken to the next sign-up page. 5. Create a XPlace ID. This will be your XPlace login ID and cannot be changed, so think of one that is secure and easy to remember.   6. Create a Jamplify password. You can change your password at any time. 7. Enter your Password Reset Question and Answer. This can be used at a later time if you forget your password. 8. Enter your e-mail address. You will receive e-mail notification when new information is available in [address] 9. Click Sign Up. You can now view and download portions of your medical record. 10. Click the Download Summary menu link to download a portable copy of your medical information. Additional Information    If you have questions, please visit the Frequently Asked Questions section of the Jamplify website at [email or web address] Remember, Jamplify is NOT to be used for urgent needs. For medical emergencies, dial 911. I have reviewed discharge instructions with the patient. The patient verbalized understanding. [No Acute Distress] : no acute distress [Well Nourished] : well nourished [Well Developed] : well developed [IV] : Mallampati Class: IV [No Resp Distress] : no resp distress [No Acc Muscle Use] : no acc muscle use [Clear to Auscultation Bilaterally] : clear to auscultation bilaterally [No Focal Deficits] : no focal deficits [Oriented x3] : oriented x3 [Normal Affect] : normal affect [TextBox_44] : NC> 17

## 2023-04-12 ENCOUNTER — HOSPITAL ENCOUNTER (INPATIENT)
Facility: HOSPITAL | Age: 48
LOS: 4 days | Discharge: HOME | DRG: 423 | End: 2023-04-17
Attending: EMERGENCY MEDICINE | Admitting: PSYCHIATRY & NEUROLOGY
Payer: COMMERCIAL

## 2023-04-12 DIAGNOSIS — F19.10 POLYSUBSTANCE ABUSE (HCC): ICD-10-CM

## 2023-04-12 DIAGNOSIS — R45.851 SUICIDAL IDEATION: Primary | ICD-10-CM

## 2023-04-12 LAB
APPEARANCE UR: CLEAR
BILIRUB UR QL: NEGATIVE
COLOR UR: YELLOW
FLUAV RNA SPEC QL NAA+PROBE: NOT DETECTED
FLUBV RNA SPEC QL NAA+PROBE: NOT DETECTED
GLUCOSE UR STRIP.AUTO-MCNC: NEGATIVE MG/DL
HGB UR QL STRIP: NEGATIVE
KETONES UR QL STRIP.AUTO: NEGATIVE MG/DL
LEUKOCYTE ESTERASE UR QL STRIP.AUTO: NEGATIVE
NITRITE UR QL STRIP.AUTO: NEGATIVE
PH UR STRIP: 5.5 (ref 5–8)
PROT UR STRIP-MCNC: NEGATIVE MG/DL
SARS-COV-2 RNA RESP QL NAA+PROBE: NOT DETECTED
SP GR UR REFRACTOMETRY: 1.02 (ref 1–1.03)
UROBILINOGEN UR QL STRIP.AUTO: 1 EU/DL (ref 0.2–1)

## 2023-04-12 PROCEDURE — 85025 COMPLETE CBC W/AUTO DIFF WBC: CPT

## 2023-04-12 PROCEDURE — 80307 DRUG TEST PRSMV CHEM ANLYZR: CPT

## 2023-04-12 PROCEDURE — 81003 URINALYSIS AUTO W/O SCOPE: CPT

## 2023-04-12 PROCEDURE — 99285 EMERGENCY DEPT VISIT HI MDM: CPT

## 2023-04-12 PROCEDURE — 87636 SARSCOV2 & INF A&B AMP PRB: CPT

## 2023-04-12 PROCEDURE — 80053 COMPREHEN METABOLIC PANEL: CPT

## 2023-04-12 PROCEDURE — 82077 ASSAY SPEC XCP UR&BREATH IA: CPT

## 2023-04-13 PROBLEM — F20.0 PARANOID SCHIZOPHRENIA (HCC): Status: ACTIVE | Noted: 2023-04-13

## 2023-04-13 LAB
ALBUMIN SERPL-MCNC: 3.7 G/DL (ref 3.4–5)
ALBUMIN/GLOB SERPL: 0.9 (ref 0.8–1.7)
ALP SERPL-CCNC: 82 U/L (ref 45–117)
ALT SERPL-CCNC: 34 U/L (ref 16–61)
AMPHET UR QL SCN: NEGATIVE
ANION GAP SERPL CALC-SCNC: 8 MMOL/L (ref 3–18)
AST SERPL-CCNC: 24 U/L (ref 10–38)
BARBITURATES UR QL SCN: NEGATIVE
BASOPHILS # BLD: 0 K/UL (ref 0–0.1)
BASOPHILS NFR BLD: 0 % (ref 0–2)
BENZODIAZ UR QL: NEGATIVE
BILIRUB SERPL-MCNC: 0.2 MG/DL (ref 0.2–1)
BUN SERPL-MCNC: 12 MG/DL (ref 7–18)
BUN/CREAT SERPL: 13 (ref 12–20)
CALCIUM SERPL-MCNC: 9.4 MG/DL (ref 8.5–10.1)
CANNABINOIDS UR QL SCN: POSITIVE
CHLORIDE SERPL-SCNC: 99 MMOL/L (ref 100–111)
CO2 SERPL-SCNC: 31 MMOL/L (ref 21–32)
COCAINE UR QL SCN: POSITIVE
CREAT SERPL-MCNC: 0.96 MG/DL (ref 0.6–1.3)
DIFFERENTIAL METHOD BLD: ABNORMAL
EOSINOPHIL # BLD: 0.2 K/UL (ref 0–0.4)
EOSINOPHIL NFR BLD: 2 % (ref 0–5)
ERYTHROCYTE [DISTWIDTH] IN BLOOD BY AUTOMATED COUNT: 13.3 % (ref 11.6–14.5)
ETHANOL SERPL-MCNC: 28 MG/DL (ref 0–3)
GLOBULIN SER CALC-MCNC: 4.1 G/DL (ref 2–4)
GLUCOSE SERPL-MCNC: 99 MG/DL (ref 74–99)
HCT VFR BLD AUTO: 44.9 % (ref 36–48)
HGB BLD-MCNC: 14.6 G/DL (ref 13–16)
IMM GRANULOCYTES # BLD AUTO: 0 K/UL (ref 0–0.04)
IMM GRANULOCYTES NFR BLD AUTO: 0 % (ref 0–0.5)
LYMPHOCYTES # BLD: 3.5 K/UL (ref 0.9–3.6)
LYMPHOCYTES NFR BLD: 40 % (ref 21–52)
Lab: ABNORMAL
MCH RBC QN AUTO: 26.5 PG (ref 24–34)
MCHC RBC AUTO-ENTMCNC: 32.5 G/DL (ref 31–37)
MCV RBC AUTO: 81.5 FL (ref 78–100)
METHADONE UR QL: NEGATIVE
MONOCYTES # BLD: 0.7 K/UL (ref 0.05–1.2)
MONOCYTES NFR BLD: 8 % (ref 3–10)
NEUTS SEG # BLD: 4.4 K/UL (ref 1.8–8)
NEUTS SEG NFR BLD: 50 % (ref 40–73)
NRBC # BLD: 0 K/UL (ref 0–0.01)
NRBC BLD-RTO: 0 PER 100 WBC
OPIATES UR QL: POSITIVE
PCP UR QL: NEGATIVE
PLATELET # BLD AUTO: 241 K/UL (ref 135–420)
PMV BLD AUTO: 9.1 FL (ref 9.2–11.8)
POTASSIUM SERPL-SCNC: 3.2 MMOL/L (ref 3.5–5.5)
PROT SERPL-MCNC: 7.8 G/DL (ref 6.4–8.2)
RBC # BLD AUTO: 5.51 M/UL (ref 4.35–5.65)
SODIUM SERPL-SCNC: 138 MMOL/L (ref 136–145)
WBC # BLD AUTO: 8.8 K/UL (ref 4.6–13.2)

## 2023-04-13 PROCEDURE — 1240000000 HC EMOTIONAL WELLNESS R&B

## 2023-04-13 PROCEDURE — 99222 1ST HOSP IP/OBS MODERATE 55: CPT | Performed by: PSYCHIATRY & NEUROLOGY

## 2023-04-13 PROCEDURE — 6370000000 HC RX 637 (ALT 250 FOR IP): Performed by: EMERGENCY MEDICINE

## 2023-04-13 PROCEDURE — 6370000000 HC RX 637 (ALT 250 FOR IP): Performed by: STUDENT IN AN ORGANIZED HEALTH CARE EDUCATION/TRAINING PROGRAM

## 2023-04-13 PROCEDURE — 6370000000 HC RX 637 (ALT 250 FOR IP): Performed by: PSYCHIATRY & NEUROLOGY

## 2023-04-13 RX ORDER — METOPROLOL TARTRATE 50 MG/1
25 TABLET, FILM COATED ORAL DAILY
Status: DISCONTINUED | OUTPATIENT
Start: 2023-04-13 | End: 2023-04-17 | Stop reason: HOSPADM

## 2023-04-13 RX ORDER — FOLIC ACID 1 MG/1
1 TABLET ORAL DAILY
Status: DISCONTINUED | OUTPATIENT
Start: 2023-04-13 | End: 2023-04-17 | Stop reason: HOSPADM

## 2023-04-13 RX ORDER — ASPIRIN 81 MG/1
TABLET ORAL
COMMUNITY

## 2023-04-13 RX ORDER — AMOXICILLIN 250 MG
CAPSULE ORAL
Status: ON HOLD | COMMUNITY
End: 2023-04-17 | Stop reason: HOSPADM

## 2023-04-13 RX ORDER — METFORMIN HYDROCHLORIDE 500 MG/1
TABLET, EXTENDED RELEASE ORAL
Status: ON HOLD | COMMUNITY
End: 2023-04-17 | Stop reason: HOSPADM

## 2023-04-13 RX ORDER — POTASSIUM CHLORIDE 20 MEQ/1
40 TABLET, EXTENDED RELEASE ORAL
Status: COMPLETED | OUTPATIENT
Start: 2023-04-13 | End: 2023-04-13

## 2023-04-13 RX ORDER — DICYCLOMINE HYDROCHLORIDE 10 MG/5ML
SOLUTION ORAL
Status: ON HOLD | COMMUNITY
End: 2023-04-17 | Stop reason: HOSPADM

## 2023-04-13 RX ORDER — HYDROXYZINE PAMOATE 50 MG/1
50 CAPSULE ORAL 3 TIMES DAILY PRN
Status: DISCONTINUED | OUTPATIENT
Start: 2023-04-13 | End: 2023-04-17 | Stop reason: HOSPADM

## 2023-04-13 RX ORDER — PREDNISONE 50 MG/1
TABLET ORAL
Status: ON HOLD | COMMUNITY
End: 2023-04-17 | Stop reason: HOSPADM

## 2023-04-13 RX ORDER — POTASSIUM CHLORIDE 20 MEQ/1
20 TABLET, EXTENDED RELEASE ORAL
Status: COMPLETED | OUTPATIENT
Start: 2023-04-14 | End: 2023-04-16

## 2023-04-13 RX ORDER — MELOXICAM 15 MG/1
TABLET ORAL
Status: ON HOLD | COMMUNITY
End: 2023-04-17 | Stop reason: HOSPADM

## 2023-04-13 RX ORDER — METHYLPREDNISOLONE 4 MG/1
TABLET ORAL
Status: ON HOLD | COMMUNITY
End: 2023-04-17 | Stop reason: HOSPADM

## 2023-04-13 RX ORDER — ERGOCALCIFEROL 1.25 MG/1
CAPSULE ORAL
COMMUNITY

## 2023-04-13 RX ORDER — BUPRENORPHINE AND NALOXONE 8; 2 MG/1; MG/1
FILM, SOLUBLE BUCCAL; SUBLINGUAL
Status: ON HOLD | COMMUNITY
End: 2023-04-17 | Stop reason: HOSPADM

## 2023-04-13 RX ORDER — OXYCODONE HYDROCHLORIDE 5 MG/1
TABLET ORAL
Status: ON HOLD | COMMUNITY
End: 2023-04-17 | Stop reason: HOSPADM

## 2023-04-13 RX ORDER — ACETAMINOPHEN 325 MG/1
TABLET ORAL
Status: ON HOLD | COMMUNITY
End: 2023-04-17 | Stop reason: HOSPADM

## 2023-04-13 RX ORDER — LORAZEPAM 1 MG/1
2 TABLET ORAL EVERY 4 HOURS PRN
Status: DISCONTINUED | OUTPATIENT
Start: 2023-04-13 | End: 2023-04-13

## 2023-04-13 RX ORDER — CLONIDINE HYDROCHLORIDE 0.1 MG/1
0.1 TABLET ORAL EVERY 4 HOURS PRN
Status: DISCONTINUED | OUTPATIENT
Start: 2023-04-13 | End: 2023-04-17 | Stop reason: HOSPADM

## 2023-04-13 RX ORDER — TRAZODONE HYDROCHLORIDE 50 MG/1
50 TABLET ORAL NIGHTLY
Status: DISCONTINUED | OUTPATIENT
Start: 2023-04-13 | End: 2023-04-17 | Stop reason: HOSPADM

## 2023-04-13 RX ORDER — QUETIAPINE FUMARATE 50 MG/1
50 TABLET, EXTENDED RELEASE ORAL NIGHTLY
Status: DISCONTINUED | OUTPATIENT
Start: 2023-04-13 | End: 2023-04-17 | Stop reason: HOSPADM

## 2023-04-13 RX ORDER — TERBINAFINE HYDROCHLORIDE 250 MG/1
TABLET ORAL
Status: ON HOLD | COMMUNITY
End: 2023-04-17 | Stop reason: HOSPADM

## 2023-04-13 RX ORDER — LANCETS 30 GAUGE
EACH MISCELLANEOUS
COMMUNITY

## 2023-04-13 RX ORDER — QUETIAPINE FUMARATE 100 MG/1
TABLET, FILM COATED ORAL
Status: ON HOLD | COMMUNITY
End: 2023-04-17 | Stop reason: HOSPADM

## 2023-04-13 RX ORDER — GAUZE BANDAGE 2" X 2"
100 BANDAGE TOPICAL DAILY
Status: DISCONTINUED | OUTPATIENT
Start: 2023-04-13 | End: 2023-04-17 | Stop reason: HOSPADM

## 2023-04-13 RX ORDER — ONDANSETRON 4 MG/1
TABLET, FILM COATED ORAL
Status: ON HOLD | COMMUNITY
End: 2023-04-17 | Stop reason: HOSPADM

## 2023-04-13 RX ORDER — PRAVASTATIN SODIUM 10 MG
10 TABLET ORAL NIGHTLY
Status: DISCONTINUED | OUTPATIENT
Start: 2023-04-13 | End: 2023-04-17 | Stop reason: HOSPADM

## 2023-04-13 RX ORDER — VELPATASVIR AND SOFOSBUVIR 100; 400 MG/1; MG/1
TABLET, FILM COATED ORAL
Status: ON HOLD | COMMUNITY
End: 2023-04-17 | Stop reason: HOSPADM

## 2023-04-13 RX ORDER — CHLORTHALIDONE 25 MG/1
TABLET ORAL
Status: ON HOLD | COMMUNITY
End: 2023-04-17 | Stop reason: HOSPADM

## 2023-04-13 RX ORDER — SOFOSBUVIR, VELPATASVIR, AND VOXILAPREVIR 400; 100; 100 MG/1; MG/1; MG/1
1 TABLET, FILM COATED ORAL DAILY
Status: ON HOLD | COMMUNITY
End: 2023-04-17 | Stop reason: HOSPADM

## 2023-04-13 RX ORDER — ESCITALOPRAM OXALATE 10 MG/1
TABLET ORAL
Status: ON HOLD | COMMUNITY
End: 2023-04-17 | Stop reason: HOSPADM

## 2023-04-13 RX ORDER — HYDROCHLOROTHIAZIDE 25 MG/1
TABLET ORAL
Status: ON HOLD | COMMUNITY
End: 2023-04-17 | Stop reason: HOSPADM

## 2023-04-13 RX ORDER — DICYCLOMINE HYDROCHLORIDE 10 MG/1
20 CAPSULE ORAL 4 TIMES DAILY PRN
Status: DISCONTINUED | OUTPATIENT
Start: 2023-04-13 | End: 2023-04-17 | Stop reason: HOSPADM

## 2023-04-13 RX ORDER — NAPROXEN 500 MG/1
TABLET ORAL
Status: ON HOLD | COMMUNITY
End: 2023-04-17 | Stop reason: HOSPADM

## 2023-04-13 RX ORDER — NICOTINE 21 MG/24HR
1 PATCH, TRANSDERMAL 24 HOURS TRANSDERMAL DAILY
Status: DISCONTINUED | OUTPATIENT
Start: 2023-04-13 | End: 2023-04-17 | Stop reason: HOSPADM

## 2023-04-13 RX ORDER — QUETIAPINE FUMARATE 50 MG/1
TABLET, FILM COATED ORAL
Status: ON HOLD | COMMUNITY
End: 2023-04-17 | Stop reason: HOSPADM

## 2023-04-13 RX ORDER — LORAZEPAM 1 MG/1
1 TABLET ORAL EVERY 4 HOURS PRN
Status: DISCONTINUED | OUTPATIENT
Start: 2023-04-13 | End: 2023-04-13

## 2023-04-13 RX ORDER — CLOTRIMAZOLE AND BETAMETHASONE DIPROPIONATE 10; .64 MG/G; MG/G
CREAM TOPICAL
Status: ON HOLD | COMMUNITY
End: 2023-04-17 | Stop reason: HOSPADM

## 2023-04-13 RX ORDER — ONDANSETRON 4 MG/1
TABLET, ORALLY DISINTEGRATING ORAL
Status: ON HOLD | COMMUNITY
End: 2023-04-17 | Stop reason: HOSPADM

## 2023-04-13 RX ORDER — BENZTROPINE MESYLATE 1 MG/ML
1 INJECTION INTRAMUSCULAR; INTRAVENOUS EVERY 6 HOURS PRN
Status: DISCONTINUED | OUTPATIENT
Start: 2023-04-13 | End: 2023-04-17 | Stop reason: HOSPADM

## 2023-04-13 RX ORDER — MULTIVITAMIN WITH IRON
1 TABLET ORAL DAILY
Status: DISCONTINUED | OUTPATIENT
Start: 2023-04-13 | End: 2023-04-17 | Stop reason: HOSPADM

## 2023-04-13 RX ORDER — POTASSIUM CHLORIDE 20 MEQ/1
TABLET, EXTENDED RELEASE ORAL
Status: ON HOLD | COMMUNITY
End: 2023-04-17 | Stop reason: HOSPADM

## 2023-04-13 RX ORDER — NICOTINE 21 MG/24HR
PATCH, TRANSDERMAL 24 HOURS TRANSDERMAL
Status: ON HOLD | COMMUNITY
End: 2023-04-17 | Stop reason: HOSPADM

## 2023-04-13 RX ORDER — IBUPROFEN 400 MG/1
800 TABLET ORAL EVERY 6 HOURS PRN
Status: DISCONTINUED | OUTPATIENT
Start: 2023-04-13 | End: 2023-04-17 | Stop reason: HOSPADM

## 2023-04-13 RX ORDER — HYDROCODONE BITARTRATE AND ACETAMINOPHEN 5; 325 MG/1; MG/1
TABLET ORAL
Status: ON HOLD | COMMUNITY
End: 2023-04-17 | Stop reason: HOSPADM

## 2023-04-13 RX ORDER — BENZTROPINE MESYLATE 1 MG/1
1 TABLET ORAL EVERY 6 HOURS PRN
Status: DISCONTINUED | OUTPATIENT
Start: 2023-04-13 | End: 2023-04-17 | Stop reason: HOSPADM

## 2023-04-13 RX ORDER — LOPERAMIDE HYDROCHLORIDE 2 MG/1
2 CAPSULE ORAL 4 TIMES DAILY PRN
Status: DISCONTINUED | OUTPATIENT
Start: 2023-04-13 | End: 2023-04-17 | Stop reason: HOSPADM

## 2023-04-13 RX ADMIN — Medication 100 MG: at 16:39

## 2023-04-13 RX ADMIN — SERTRALINE 50 MG: 50 TABLET, FILM COATED ORAL at 16:43

## 2023-04-13 RX ADMIN — QUETIAPINE FUMARATE 50 MG: 50 TABLET, EXTENDED RELEASE ORAL at 20:20

## 2023-04-13 RX ADMIN — POTASSIUM CHLORIDE 40 MEQ: 1500 TABLET, EXTENDED RELEASE ORAL at 09:24

## 2023-04-13 RX ADMIN — PRAVASTATIN SODIUM 10 MG: 10 TABLET ORAL at 20:21

## 2023-04-13 RX ADMIN — FOLIC ACID 1 MG: 1 TABLET ORAL at 16:39

## 2023-04-13 RX ADMIN — THERA TABS 1 TABLET: TAB at 16:41

## 2023-04-13 RX ADMIN — TRAZODONE HYDROCHLORIDE 50 MG: 50 TABLET ORAL at 20:21

## 2023-04-13 RX ADMIN — METOPROLOL TARTRATE 25 MG: 50 TABLET, FILM COATED ORAL at 16:39

## 2023-04-13 ASSESSMENT — LIFESTYLE VARIABLES
HOW MANY STANDARD DRINKS CONTAINING ALCOHOL DO YOU HAVE ON A TYPICAL DAY: 3 OR 4
HOW OFTEN DO YOU HAVE A DRINK CONTAINING ALCOHOL: 4 OR MORE TIMES A WEEK

## 2023-04-13 ASSESSMENT — SLEEP AND FATIGUE QUESTIONNAIRES
DO YOU HAVE DIFFICULTY SLEEPING: NO
AVERAGE NUMBER OF SLEEP HOURS: 7
DO YOU USE A SLEEP AID: NO

## 2023-04-13 ASSESSMENT — ENCOUNTER SYMPTOMS: RESPIRATORY NEGATIVE: 1

## 2023-04-14 PROBLEM — F33.3 SEVERE RECURRENT MAJOR DEPRESSION WITH PSYCHOTIC FEATURES (HCC): Chronic | Status: ACTIVE | Noted: 2023-04-14

## 2023-04-14 PROBLEM — E13.9 NIDDY (NON-INSULIN DEPENDENT DIABETES MELLITUS IN YOUNG) (HCC): Chronic | Status: ACTIVE | Noted: 2023-04-14

## 2023-04-14 PROBLEM — F14.20 COCAINE USE DISORDER, SEVERE, DEPENDENCE (HCC): Chronic | Status: ACTIVE | Noted: 2023-04-14

## 2023-04-14 PROBLEM — F11.20 OPIOID USE DISORDER, SEVERE, DEPENDENCE (HCC): Chronic | Status: ACTIVE | Noted: 2023-04-14

## 2023-04-14 PROBLEM — F20.0 PARANOID SCHIZOPHRENIA (HCC): Status: RESOLVED | Noted: 2023-04-13 | Resolved: 2023-04-14

## 2023-04-14 LAB — GLUCOSE BLD STRIP.AUTO-MCNC: 127 MG/DL (ref 70–110)

## 2023-04-14 PROCEDURE — 6370000000 HC RX 637 (ALT 250 FOR IP): Performed by: PSYCHIATRY & NEUROLOGY

## 2023-04-14 PROCEDURE — 6370000000 HC RX 637 (ALT 250 FOR IP): Performed by: STUDENT IN AN ORGANIZED HEALTH CARE EDUCATION/TRAINING PROGRAM

## 2023-04-14 PROCEDURE — 99231 SBSQ HOSP IP/OBS SF/LOW 25: CPT | Performed by: PSYCHIATRY & NEUROLOGY

## 2023-04-14 PROCEDURE — 1240000000 HC EMOTIONAL WELLNESS R&B

## 2023-04-14 PROCEDURE — 82962 GLUCOSE BLOOD TEST: CPT

## 2023-04-14 RX ADMIN — PRAVASTATIN SODIUM 10 MG: 10 TABLET ORAL at 20:54

## 2023-04-14 RX ADMIN — METOPROLOL TARTRATE 25 MG: 50 TABLET, FILM COATED ORAL at 09:05

## 2023-04-14 RX ADMIN — QUETIAPINE FUMARATE 50 MG: 50 TABLET, EXTENDED RELEASE ORAL at 20:54

## 2023-04-14 RX ADMIN — FOLIC ACID 1 MG: 1 TABLET ORAL at 09:05

## 2023-04-14 RX ADMIN — POTASSIUM CHLORIDE 20 MEQ: 1500 TABLET, EXTENDED RELEASE ORAL at 09:07

## 2023-04-14 RX ADMIN — THERA TABS 1 TABLET: TAB at 09:07

## 2023-04-14 RX ADMIN — Medication 100 MG: at 09:08

## 2023-04-14 RX ADMIN — TRAZODONE HYDROCHLORIDE 50 MG: 50 TABLET ORAL at 20:54

## 2023-04-14 RX ADMIN — SERTRALINE 50 MG: 50 TABLET, FILM COATED ORAL at 09:08

## 2023-04-15 LAB — GLUCOSE BLD STRIP.AUTO-MCNC: 109 MG/DL (ref 70–110)

## 2023-04-15 PROCEDURE — 82962 GLUCOSE BLOOD TEST: CPT

## 2023-04-15 PROCEDURE — 6370000000 HC RX 637 (ALT 250 FOR IP): Performed by: PSYCHIATRY & NEUROLOGY

## 2023-04-15 PROCEDURE — 6370000000 HC RX 637 (ALT 250 FOR IP): Performed by: STUDENT IN AN ORGANIZED HEALTH CARE EDUCATION/TRAINING PROGRAM

## 2023-04-15 PROCEDURE — 99231 SBSQ HOSP IP/OBS SF/LOW 25: CPT | Performed by: PSYCHIATRY & NEUROLOGY

## 2023-04-15 PROCEDURE — 1240000000 HC EMOTIONAL WELLNESS R&B

## 2023-04-15 RX ADMIN — POTASSIUM CHLORIDE 20 MEQ: 1500 TABLET, EXTENDED RELEASE ORAL at 09:57

## 2023-04-15 RX ADMIN — FOLIC ACID 1 MG: 1 TABLET ORAL at 10:35

## 2023-04-15 RX ADMIN — METOPROLOL TARTRATE 25 MG: 50 TABLET, FILM COATED ORAL at 10:35

## 2023-04-15 RX ADMIN — TRAZODONE HYDROCHLORIDE 50 MG: 50 TABLET ORAL at 21:41

## 2023-04-15 RX ADMIN — QUETIAPINE FUMARATE 50 MG: 50 TABLET, EXTENDED RELEASE ORAL at 21:40

## 2023-04-15 RX ADMIN — PRAVASTATIN SODIUM 10 MG: 10 TABLET ORAL at 21:40

## 2023-04-15 RX ADMIN — SERTRALINE 50 MG: 50 TABLET, FILM COATED ORAL at 10:35

## 2023-04-15 RX ADMIN — Medication 100 MG: at 10:35

## 2023-04-15 RX ADMIN — THERA TABS 1 TABLET: TAB at 10:35

## 2023-04-16 LAB
GLUCOSE BLD STRIP.AUTO-MCNC: 109 MG/DL (ref 70–110)
GLUCOSE BLD STRIP.AUTO-MCNC: 99 MG/DL (ref 70–110)

## 2023-04-16 PROCEDURE — 6370000000 HC RX 637 (ALT 250 FOR IP): Performed by: PSYCHIATRY & NEUROLOGY

## 2023-04-16 PROCEDURE — 1240000000 HC EMOTIONAL WELLNESS R&B

## 2023-04-16 PROCEDURE — 82962 GLUCOSE BLOOD TEST: CPT

## 2023-04-16 PROCEDURE — 6370000000 HC RX 637 (ALT 250 FOR IP): Performed by: STUDENT IN AN ORGANIZED HEALTH CARE EDUCATION/TRAINING PROGRAM

## 2023-04-16 PROCEDURE — 99231 SBSQ HOSP IP/OBS SF/LOW 25: CPT | Performed by: PSYCHIATRY & NEUROLOGY

## 2023-04-16 RX ADMIN — PRAVASTATIN SODIUM 10 MG: 10 TABLET ORAL at 21:45

## 2023-04-16 RX ADMIN — THERA TABS 1 TABLET: TAB at 09:13

## 2023-04-16 RX ADMIN — QUETIAPINE FUMARATE 50 MG: 50 TABLET, EXTENDED RELEASE ORAL at 21:45

## 2023-04-16 RX ADMIN — METOPROLOL TARTRATE 25 MG: 50 TABLET, FILM COATED ORAL at 09:13

## 2023-04-16 RX ADMIN — Medication 100 MG: at 09:13

## 2023-04-16 RX ADMIN — POTASSIUM CHLORIDE 20 MEQ: 1500 TABLET, EXTENDED RELEASE ORAL at 09:13

## 2023-04-16 RX ADMIN — SERTRALINE 50 MG: 50 TABLET, FILM COATED ORAL at 09:13

## 2023-04-16 RX ADMIN — TRAZODONE HYDROCHLORIDE 50 MG: 50 TABLET ORAL at 21:45

## 2023-04-16 RX ADMIN — FOLIC ACID 1 MG: 1 TABLET ORAL at 09:13

## 2023-04-17 VITALS
HEART RATE: 73 BPM | DIASTOLIC BLOOD PRESSURE: 73 MMHG | TEMPERATURE: 98.4 F | HEIGHT: 62 IN | RESPIRATION RATE: 18 BRPM | BODY MASS INDEX: 46.38 KG/M2 | OXYGEN SATURATION: 99 % | WEIGHT: 252 LBS | SYSTOLIC BLOOD PRESSURE: 117 MMHG

## 2023-04-17 LAB — GLUCOSE BLD STRIP.AUTO-MCNC: 106 MG/DL (ref 70–110)

## 2023-04-17 PROCEDURE — 99238 HOSP IP/OBS DSCHRG MGMT 30/<: CPT | Performed by: PSYCHIATRY & NEUROLOGY

## 2023-04-17 PROCEDURE — 6370000000 HC RX 637 (ALT 250 FOR IP): Performed by: PSYCHIATRY & NEUROLOGY

## 2023-04-17 PROCEDURE — 82962 GLUCOSE BLOOD TEST: CPT

## 2023-04-17 RX ORDER — PRAVASTATIN SODIUM 10 MG
10 TABLET ORAL NIGHTLY
Qty: 30 TABLET | Refills: 3 | Status: SHIPPED | OUTPATIENT
Start: 2023-04-17

## 2023-04-17 RX ORDER — POTASSIUM CHLORIDE 750 MG/1
10 TABLET, EXTENDED RELEASE ORAL DAILY
Status: DISCONTINUED | OUTPATIENT
Start: 2023-04-18 | End: 2023-04-17 | Stop reason: HOSPADM

## 2023-04-17 RX ORDER — TRAZODONE HYDROCHLORIDE 50 MG/1
50 TABLET ORAL NIGHTLY
Qty: 30 TABLET | Refills: 0 | Status: SHIPPED | OUTPATIENT
Start: 2023-04-17

## 2023-04-17 RX ORDER — QUETIAPINE FUMARATE 50 MG/1
50 TABLET, EXTENDED RELEASE ORAL NIGHTLY
Qty: 30 TABLET | Refills: 0 | Status: SHIPPED | OUTPATIENT
Start: 2023-04-17

## 2023-04-17 RX ORDER — POTASSIUM CHLORIDE 750 MG/1
10 TABLET, EXTENDED RELEASE ORAL DAILY
Qty: 30 TABLET | Refills: 0 | Status: SHIPPED | OUTPATIENT
Start: 2023-04-18

## 2023-04-17 RX ADMIN — Medication 100 MG: at 09:00

## 2023-04-17 RX ADMIN — FOLIC ACID 1 MG: 1 TABLET ORAL at 09:00

## 2023-04-17 RX ADMIN — SERTRALINE 50 MG: 50 TABLET, FILM COATED ORAL at 09:00

## 2023-04-17 RX ADMIN — METOPROLOL TARTRATE 25 MG: 50 TABLET, FILM COATED ORAL at 09:00

## 2023-04-17 RX ADMIN — THERA TABS 1 TABLET: TAB at 09:00

## 2023-04-17 NOTE — BSMART NOTE
Art Therapy Group Progress Note     PATIENT SCHEDULED FOR GROUP AT:    10:30 AM    GROUP STOP TIME:  11:20 AM    ATTENDANCE: HIGH (6/9 participants)    PARTICIPATION LEVEL:  Declined    ATTENTION LEVEL: N/A     TOPIC / FOCUS: Still Life Drawing     GOALS:  to improve reality orientation, practice observation and grounding techniques, practice mindfulness, decrease stress and anxiety     Mckinley Ferrer MA   Art Therapist

## 2023-04-17 NOTE — PROGRESS NOTES
Pt noted to be asleep upon initial rounds. He is sleeping without evidence of respiratory distress  Pt does not have C-PAP on at this time. No snoring respirations.

## 2023-04-17 NOTE — PLAN OF CARE
PT ISOLATIVE TO ROOM, COMING OUT FOR SNACK AND MEDICATIONS. DENIES SI/HI. RATES ANXIETY 7/10 AND DEPRESSION 2/10. DENIES PAIN . MEDICATION COMPLIANT. Q15MIN SAFETY CHECKS MAINTAINED.
hospital stay  Description: INTERVENTIONS:  1. Ensure constant observer at bedside with Q15M safety checks  2. Maintain a safe environment  3. Secure patient belongings  4. Ensure family/visitors adhere to safety recommendations  5. Ensure safety tray has been added to patient's diet order  6. Every shift and PRN: Re-assess suicidal risk via Frequent Screener    4/17/2023 0346 by Sahil Storm RN  Outcome: Progressing     Problem: Safety - Adult  Goal: Free from fall injury  4/17/2023 0346 by Sahil Storm RN  Outcome: Progressing       Pt presents with dull affect, depressed mood, with linear and goal-directed thought process. Pt has been withdrawn to self on the unit, only coming out for meals. Pt denies SI/HI/AVH. Pt is medication compliant.

## 2023-04-17 NOTE — DISCHARGE SUMMARY
[facility]    Name:  Winter Murphy  MR#:   [id number]  :  1975  ACCOUNT #:  [de-identified]  ADMIT DATE:  2023  DISCHARGE DATE:  2023    IDENTIFYING DATA:  The patient is a 49-year-old   male, resident of [place], [place], who is unemployed and covered by [employer]. BASIS FOR ADMISSION:  The patient was admitted after presentation to emergency room, saying he was suicidal, depressed, and in opiate withdrawal.    He describes a long history of psychiatric contacts dating back to age 15 when he was admitted to this facility under the care of Dr. Sudheer Boyd for depression, anxiety, and suicidal ideas. He could not recall what he took other than having a history of ADHD and being on Ritalin at some point. He had been using a variety of drugs. He had been on Prozac at some point during his brief outpatient treatment before he fell out of treatment and relapsed to drug use including shooting heroin. He was in and out of [facility] on several occasions including [id number], [id number]. He was admitted to the [facility] in 2016 and was in other treatment centers. He was admitted here in  for depression, suicidal ideas, and heroin use. He had auditory hallucinations that would come and go and sounded like people in a crowd. He had been on Seroquel which helped somewhat but could only take a low dose because of side effects. As he got older, he felt more paranoid, began hearing voices saying that he was worthless. He felt more depressed when mother  in 1. He saw a private doctor in [place] at sometime. He had been on trazodone at night for sleep and another unknown medicine, possibly mirtazapine for sleep and was taking the Seroquel. One time, he was given Wellbutrin to help stop smoking and help with depression.   He described increased stressors because of various family members dying of drug

## 2023-04-17 NOTE — BSMART NOTE
SW Contact:      Pt Contact: processed the up side of hospitalization & glad meds have been adjusted. Reviewed d/c and safety plan to include: Outpt tx will be at:  [facility] ([address], [address] [place] [address]  # [phone]  IOP Doreatha Seats LCSW  on 4/18/23 at 4:30pm  ( please arrive 4pm to register )  Medication Management: Olivia Ware FNP on 4/20/23 at 2:15pm     Dr & tx team updated.

## 2023-04-17 NOTE — GROUP NOTE
Group Therapy Note    Date: 4/17/2023    Group Start Time: 0730  Group End Time: 0745  Group Topic: Nursing    [facility] ADULT CHEM DEP    Milton Jesus RN    Spirituality- \"It's Time for an Upgrade in Your Spiritual Life\"    Group Therapy Note    Attendees: 6       Patient's Goal:  pt did not identify any goals    Notes: pt did not attend    Status After Intervention:  Unchanged    Participation Level: None    Participation Quality: Inappropriate      Speech:  mute      Thought Process/Content: Logical      Affective Functioning: Flat      Mood: euthymic      Level of consciousness:  Alert      Response to Learning: Able to verbalize current knowledge/experience      Endings: None Reported    Modes of Intervention: Media      Discipline Responsible: Registered Nurse      Signature:  Milton Jesus RN

## 2023-04-17 NOTE — PROGRESS NOTES
Received discharge order from MD,  for discharge from hospital, patient in good condition, all paperwork signed and reviewed with patient, copy and prescriptions given to patient, safety plan completed, patient escorted to lobby by T staff, transported home by wife. Dena Chadwick

## 2024-07-19 ENCOUNTER — HOSPITAL ENCOUNTER (OUTPATIENT)
Facility: HOSPITAL | Age: 49
End: 2024-07-19
Payer: MEDICAID

## 2024-07-19 DIAGNOSIS — M25.551 RIGHT HIP PAIN: ICD-10-CM

## 2024-07-19 PROCEDURE — 73502 X-RAY EXAM HIP UNI 2-3 VIEWS: CPT

## 2024-08-25 ENCOUNTER — HOSPITAL ENCOUNTER (EMERGENCY)
Facility: HOSPITAL | Age: 49
Discharge: HOME | End: 2024-08-26
Attending: EMERGENCY MEDICINE
Payer: MEDICAID

## 2024-08-25 DIAGNOSIS — U07.1 2019 NOVEL CORONAVIRUS DISEASE (COVID-19): Primary | ICD-10-CM

## 2024-08-25 PROCEDURE — 87635 SARS-COV-2 COVID-19 AMP PRB: CPT

## 2024-08-25 PROCEDURE — 87804 INFLUENZA ASSAY W/OPTIC: CPT

## 2024-08-25 PROCEDURE — 99283 EMERGENCY DEPT VISIT LOW MDM: CPT

## 2024-08-25 RX ORDER — IBUPROFEN 400 MG/1
800 TABLET, FILM COATED ORAL
Status: COMPLETED | OUTPATIENT
Start: 2024-08-26 | End: 2024-08-26

## 2024-08-25 ASSESSMENT — LIFESTYLE VARIABLES
HOW MANY STANDARD DRINKS CONTAINING ALCOHOL DO YOU HAVE ON A TYPICAL DAY: PATIENT DOES NOT DRINK
HOW OFTEN DO YOU HAVE A DRINK CONTAINING ALCOHOL: NEVER

## 2024-08-25 ASSESSMENT — PAIN - FUNCTIONAL ASSESSMENT: PAIN_FUNCTIONAL_ASSESSMENT: NONE - DENIES PAIN

## 2024-08-26 VITALS
HEIGHT: 70 IN | DIASTOLIC BLOOD PRESSURE: 80 MMHG | OXYGEN SATURATION: 97 % | HEART RATE: 99 BPM | SYSTOLIC BLOOD PRESSURE: 133 MMHG | WEIGHT: 224 LBS | BODY MASS INDEX: 32.07 KG/M2 | TEMPERATURE: 100.5 F | RESPIRATION RATE: 18 BRPM

## 2024-08-26 LAB
FLUAV AG NPH QL IA: NEGATIVE
FLUBV AG NOSE QL IA: NEGATIVE
SARS-COV-2 RDRP RESP QL NAA+PROBE: DETECTED
SOURCE: ABNORMAL

## 2024-08-26 PROCEDURE — 6370000000 HC RX 637 (ALT 250 FOR IP): Performed by: EMERGENCY MEDICINE

## 2024-08-26 RX ADMIN — IBUPROFEN 800 MG: 400 TABLET, FILM COATED ORAL at 00:00

## 2024-08-26 NOTE — ED PROVIDER NOTES
EMERGENCY DEPARTMENT HISTORY AND PHYSICAL EXAM    11:52 PM EDT seen at this time and QA        Date: 8/25/2024  Patient Name: Alen Gruber    History of Presenting Illness     Chief Complaint   Patient presents with    Sore Throat    Congestion    Headache    Cough         History Provided By: patient    Additional History (Context): Alen Gruber is a 49 y.o. male presents with history of diabetes high blood pressure, has nasal congestion sore throat cough and headache starting today.  Measured temperature here.  Using Mucinex and cough drops without sufficient relief.  No vomiting diarrhea abdominal pain chest pain not short of breath.    PCP: Esdras Redd APRN - NP    Chief Complaint:   Duration:    Timing:    Location:   Quality:   Severity:   Modifying Factors:   Associated Symptoms:       No current facility-administered medications for this encounter.     Current Outpatient Medications   Medication Sig Dispense Refill    sertraline (ZOLOFT) 50 MG tablet Take 1 tablet by mouth daily 30 tablet 0    traZODone (DESYREL) 50 MG tablet Take 1 tablet by mouth nightly 30 tablet 0    pravastatin (PRAVACHOL) 10 MG tablet Take 1 tablet by mouth nightly 30 tablet 3    QUEtiapine (SEROQUEL XR) 50 MG extended release tablet Take 1 tablet by mouth nightly 30 tablet 0    metoprolol tartrate (LOPRESSOR) 25 MG tablet Take 1 tablet by mouth daily 30 tablet 0    potassium chloride (KLOR-CON M) 10 MEQ extended release tablet Take 1 tablet by mouth daily 30 tablet 0    Lancets (ONETOUCH DELICA PLUS [id number]) MISC OneTouch Delica Plus Lancet 30 gauge      aspirin 81 MG EC tablet aspirin 81 mg tablet,delayed release   Take 1 tablet twice a day by oral route.      ergocalciferol (ERGOCALCIFEROL) 1.25 MG (30710 UT) capsule Vitamin D2 1,250 mcg (50,000 unit) capsule      Dulaglutide (TRULICITY SC) Inject into the skin (Patient not taking: Reported on 2/13/2023)         Past History     Past Medical History:  Past Medical History:  °C) (!) 102 18 (!) 149/89 97 %       Vital Signs-Reviewed the patient's vital signs.    Pulse Oximetry Analysis, Cardiac Monitor, 12 lead ekg:      Interpreted by the EP.      Records Reviewed: Nursing notes reviewed (Time of Review: 12:22 AM)    Procedures:   Critical Care Time: 0  If critical care time is note it is exclusive of any separately billable procedures.     Aspirin: (was aspirin given for stroke?)    Diagnosis     Disposition: DISPOSITION Decision To Discharge 08/26/2024 12:22:01 AM  Condition at Disposition: Stable       DISCHARGE MEDICATIONS:  Current Discharge Medication List             Details   sertraline (ZOLOFT) 50 MG tablet Take 1 tablet by mouth daily  Qty: 30 tablet, Refills: 0      traZODone (DESYREL) 50 MG tablet Take 1 tablet by mouth nightly  Qty: 30 tablet, Refills: 0      pravastatin (PRAVACHOL) 10 MG tablet Take 1 tablet by mouth nightly  Qty: 30 tablet, Refills: 3      QUEtiapine (SEROQUEL XR) 50 MG extended release tablet Take 1 tablet by mouth nightly  Qty: 30 tablet, Refills: 0      metoprolol tartrate (LOPRESSOR) 25 MG tablet Take 1 tablet by mouth daily  Qty: 30 tablet, Refills: 0      potassium chloride (KLOR-CON M) 10 MEQ extended release tablet Take 1 tablet by mouth daily  Qty: 30 tablet, Refills: 0      Lancets (ONETOUCH DELICA PLUS [id number]) MISC OneTouch Delica Plus Lancet 30 gauge      aspirin 81 MG EC tablet aspirin 81 mg tablet,delayed release   Take 1 tablet twice a day by oral route.      ergocalciferol (ERGOCALCIFEROL) 1.25 MG (90677 UT) capsule Vitamin D2 1,250 mcg (50,000 unit) capsule      Dulaglutide (TRULICITY SC) Inject into the skin             DISCONTINUED MEDICATIONS:  Current Discharge Medication List          PATIENT REFERRED TO:  Follow Up with:  Esdras Redd, APRN - NP  [address] [facility] [address] [phone]    In 1 week      _______________________________    Clinical Impression:   1. 2019 novel coronavirus disease (COVID-19)

## 2024-08-26 NOTE — ED TRIAGE NOTES
Patient A/O x 4, presented to the ED with complaint of cough, congestion, sore throat, headache x 1 day.

## 2024-11-12 ENCOUNTER — HOSPITAL ENCOUNTER (EMERGENCY)
Facility: HOSPITAL | Age: 49
Discharge: HOME | End: 2024-11-12
Attending: EMERGENCY MEDICINE
Payer: MEDICAID

## 2024-11-12 VITALS
OXYGEN SATURATION: 99 % | HEIGHT: 70 IN | TEMPERATURE: 97.9 F | SYSTOLIC BLOOD PRESSURE: 137 MMHG | DIASTOLIC BLOOD PRESSURE: 90 MMHG | BODY MASS INDEX: 32.21 KG/M2 | WEIGHT: 225 LBS | RESPIRATION RATE: 18 BRPM | HEART RATE: 85 BPM

## 2024-11-12 DIAGNOSIS — M79.601 RIGHT ARM PAIN: Primary | ICD-10-CM

## 2024-11-12 DIAGNOSIS — R20.0 NUMBNESS AND TINGLING IN RIGHT HAND: ICD-10-CM

## 2024-11-12 DIAGNOSIS — R20.2 NUMBNESS AND TINGLING IN RIGHT HAND: ICD-10-CM

## 2024-11-12 PROCEDURE — 99283 EMERGENCY DEPT VISIT LOW MDM: CPT

## 2024-11-12 PROCEDURE — 6370000000 HC RX 637 (ALT 250 FOR IP)

## 2024-11-12 RX ORDER — IBUPROFEN 400 MG/1
800 TABLET, FILM COATED ORAL
Status: COMPLETED | OUTPATIENT
Start: 2024-11-12 | End: 2024-11-12

## 2024-11-12 RX ORDER — CYCLOBENZAPRINE HCL 5 MG
5 TABLET ORAL 3 TIMES DAILY PRN
Qty: 30 TABLET | Refills: 0 | Status: SHIPPED | OUTPATIENT
Start: 2024-11-12 | End: 2024-11-22

## 2024-11-12 RX ORDER — IBUPROFEN 800 MG/1
800 TABLET, FILM COATED ORAL EVERY 8 HOURS PRN
Qty: 42 TABLET | Refills: 0 | Status: SHIPPED | OUTPATIENT
Start: 2024-11-12

## 2024-11-12 RX ORDER — CYCLOBENZAPRINE HCL 10 MG
10 TABLET ORAL
Status: COMPLETED | OUTPATIENT
Start: 2024-11-12 | End: 2024-11-12

## 2024-11-12 RX ADMIN — CYCLOBENZAPRINE 10 MG: 10 TABLET, FILM COATED ORAL at 20:59

## 2024-11-12 RX ADMIN — IBUPROFEN 800 MG: 400 TABLET, FILM COATED ORAL at 20:59

## 2024-11-12 ASSESSMENT — PAIN DESCRIPTION - ONSET: ONSET: GRADUAL

## 2024-11-12 ASSESSMENT — PAIN DESCRIPTION - LOCATION: LOCATION: ARM

## 2024-11-12 ASSESSMENT — PAIN DESCRIPTION - ORIENTATION: ORIENTATION: RIGHT

## 2024-11-12 ASSESSMENT — PAIN DESCRIPTION - FREQUENCY: FREQUENCY: CONTINUOUS

## 2024-11-12 ASSESSMENT — PAIN - FUNCTIONAL ASSESSMENT
PAIN_FUNCTIONAL_ASSESSMENT: 0-10
PAIN_FUNCTIONAL_ASSESSMENT: ACTIVITIES ARE NOT PREVENTED

## 2024-11-12 ASSESSMENT — PAIN SCALES - GENERAL: PAINLEVEL_OUTOF10: 8

## 2024-11-12 ASSESSMENT — PAIN DESCRIPTION - DESCRIPTORS: DESCRIPTORS: ACHING

## 2024-11-12 ASSESSMENT — PAIN DESCRIPTION - PAIN TYPE: TYPE: ACUTE PAIN

## 2024-11-12 NOTE — ED TRIAGE NOTES
Patient arrived to Er with right arm pain and numbness for past month. Patient states pain is intermittent but has not gotten any better. Taking OTC medication, with slight relief.

## 2024-11-13 NOTE — ED PROVIDER NOTES
2021 50 y/o pmhx  of htn dm hld,  here e/ R upper arm pain that radiates to the wrist and also associated numbneds and tinlging in the entire R hand.    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      Will dc pcp fu & recommendations for mri    I personally saw and examined the patient. I have reviewed and agree with the resident's findings, including all diagnostic interpretations, and plans as written. I was present during the key portions of separately billed procedures.  Bora Clay MD  [NF]      ED Course User Index  [NF] Bora Clay MD     Is this patient to be included in the SEP-1 core measure? No Exclusion criteria - the patient is NOT to be included for SEP-1 Core Measure due to: Infection is not suspected    Procedures: None  Procedures    Rhythm interpretation from monitor: not on the monitor    Social Determinants of Health: none    Supplemental Historians include: none     Documentation/Prior Results Review:  Nursing notes.    Discussion of Mangement with other Physicians, QHP or Appropriate Source:  n/a    Diagnosis and Disposition     CLINICAL IMPRESSION:  1. Right arm pain    2. Numbness and tingling in right hand         Medication List        START taking these medications      cyclobenzaprine 5 MG tablet  Commonly known as: FLEXERIL  Take 1 tablet by mouth 3 times daily as needed for Muscle spasms     ibuprofen 800 MG tablet  Commonly known as: ADVIL;MOTRIN  Take 1 tablet by mouth every 8 hours as needed for Pain            CONTINUE taking these medications      OneTouch Delica Plus [id number] Misc            ASK your doctor

## 2025-01-03 ENCOUNTER — HOSPITAL ENCOUNTER (OUTPATIENT)
Facility: HOSPITAL | Age: 50
Discharge: HOME | End: 2025-01-03
Payer: MEDICAID

## 2025-01-03 ENCOUNTER — TRANSCRIBE ORDERS (OUTPATIENT)
Facility: HOSPITAL | Age: 50
End: 2025-01-03

## 2025-01-03 DIAGNOSIS — M25.551 BILATERAL HIP PAIN: Primary | ICD-10-CM

## 2025-01-03 DIAGNOSIS — M25.552 BILATERAL HIP PAIN: ICD-10-CM

## 2025-01-03 DIAGNOSIS — M54.2 NECK PAIN: ICD-10-CM

## 2025-01-03 DIAGNOSIS — M25.552 BILATERAL HIP PAIN: Primary | ICD-10-CM

## 2025-01-03 DIAGNOSIS — M25.551 BILATERAL HIP PAIN: ICD-10-CM

## 2025-01-03 PROCEDURE — 73521 X-RAY EXAM HIPS BI 2 VIEWS: CPT

## 2025-01-11 ENCOUNTER — HOSPITAL ENCOUNTER (EMERGENCY)
Facility: HOSPITAL | Age: 50
Discharge: HOME | End: 2025-01-11
Attending: EMERGENCY MEDICINE
Payer: MEDICAID

## 2025-01-11 VITALS
RESPIRATION RATE: 18 BRPM | OXYGEN SATURATION: 95 % | DIASTOLIC BLOOD PRESSURE: 85 MMHG | HEART RATE: 97 BPM | BODY MASS INDEX: 31.5 KG/M2 | WEIGHT: 220 LBS | TEMPERATURE: 98.5 F | SYSTOLIC BLOOD PRESSURE: 119 MMHG | HEIGHT: 70 IN

## 2025-01-11 DIAGNOSIS — M79.652 MUSCULOSKELETAL THIGH PAIN, LEFT: Primary | ICD-10-CM

## 2025-01-11 LAB — D DIMER PPP FEU-MCNC: <0.27 UG/ML(FEU)

## 2025-01-11 PROCEDURE — 96372 THER/PROPH/DIAG INJ SC/IM: CPT

## 2025-01-11 PROCEDURE — 99284 EMERGENCY DEPT VISIT MOD MDM: CPT

## 2025-01-11 PROCEDURE — 6360000002 HC RX W HCPCS: Performed by: EMERGENCY MEDICINE

## 2025-01-11 PROCEDURE — 85379 FIBRIN DEGRADATION QUANT: CPT

## 2025-01-11 PROCEDURE — 6370000000 HC RX 637 (ALT 250 FOR IP): Performed by: EMERGENCY MEDICINE

## 2025-01-11 RX ORDER — PREGABALIN 75 MG/1
75 CAPSULE ORAL
Status: COMPLETED | OUTPATIENT
Start: 2025-01-11 | End: 2025-01-11

## 2025-01-11 RX ORDER — KETOROLAC TROMETHAMINE 15 MG/ML
15 INJECTION, SOLUTION INTRAMUSCULAR; INTRAVENOUS ONCE
Status: COMPLETED | OUTPATIENT
Start: 2025-01-11 | End: 2025-01-11

## 2025-01-11 RX ORDER — KETOROLAC TROMETHAMINE 10 MG/1
10 TABLET, FILM COATED ORAL EVERY 6 HOURS PRN
Qty: 20 TABLET | Refills: 0 | Status: SHIPPED | OUTPATIENT
Start: 2025-01-11

## 2025-01-11 RX ORDER — PREGABALIN 75 MG/1
75 CAPSULE ORAL 2 TIMES DAILY
Qty: 28 CAPSULE | Refills: 1 | Status: SHIPPED | OUTPATIENT
Start: 2025-01-11 | End: 2025-01-25

## 2025-01-11 RX ADMIN — KETOROLAC TROMETHAMINE 15 MG: 15 INJECTION, SOLUTION INTRAMUSCULAR; INTRAVENOUS at 19:46

## 2025-01-11 RX ADMIN — PREGABALIN 75 MG: 75 CAPSULE ORAL at 19:50

## 2025-01-11 ASSESSMENT — PAIN DESCRIPTION - LOCATION
LOCATION: HIP;LEG
LOCATION: HIP

## 2025-01-11 ASSESSMENT — PAIN DESCRIPTION - ORIENTATION
ORIENTATION: LEFT
ORIENTATION: LEFT

## 2025-01-11 ASSESSMENT — PAIN SCALES - GENERAL
PAINLEVEL_OUTOF10: 8
PAINLEVEL_OUTOF10: 8

## 2025-01-11 ASSESSMENT — PAIN - FUNCTIONAL ASSESSMENT: PAIN_FUNCTIONAL_ASSESSMENT: 0-10

## 2025-01-11 ASSESSMENT — PAIN DESCRIPTION - DESCRIPTORS
DESCRIPTORS: DISCOMFORT
DESCRIPTORS: ACHING

## 2025-01-11 NOTE — ED TRIAGE NOTES
Patient presented to the Emergency Dept with a complaint of pain to left hip radiating down to leg which started 3-4 months however this morning started getting worse. Patient states that had pain to right hip this morning however since stopped     Patient rates pain 8/10 on pain scale.  Patient denies recent injury to area    Uses OTC medication with no relief    Patient alert and oriented x 4, patient breathes freely on room air in nil cardiopulmonary distress

## 2025-01-12 NOTE — ED PROVIDER NOTES
therapist evaluation and treatment.    Documentation/Prior Results Review:  Nursing notes    Social Determinants of Health: None identified    Is this patient to be included in the SEP-1 core measure due to severe sepsis or septic shock? No Exclusion criteria - the patient is NOT to be included for SEP-1 Core Measure due to: 2+ SIRS criteria are not met    The patient will be discharged home. The patient was reassured that these symptoms do not appear to represent a serious or life threatening condition at this time. Warning signs of worsening condition were discusses and understood by the patient. Based on patient's age, coexisting illness, exam and the results of this ED evaluation, the decision to treat as an outpatient was made. Based on the information available at time of discharge, acute pathology requiring immediate intervention was deemed relative unlikely. While it is impossible to completely exclude the possibility of underlying serious disease or worsening condition, I feel the relative likelihood is extremely low. I discussed this uncertainty with the patient and/or family member/caregiver, who understood ED evaluation and treatment and felt comfortable with the outpatient treatment plan. All questions regarding care, test results and follow up were answered. At discharge, pt looked well, nontoxic, no distress, and is good candidate for outpatient follow up. They understand that they should return to the Emergency Department for any new or worsening symptoms. I stressed the importance of follow up for repeat assessment and possibly further evaluation/treatment.        Meds Given in ED:  Medications   ketorolac (TORADOL) injection 15 mg (15 mg IntraMUSCular Given 1/11/25 1946)   pregabalin (LYRICA) capsule 75 mg (75 mg Oral Given 1/11/25 1950)       Final Diagnosis:  1. Musculoskeletal thigh pain, left        Disposition:  Destination: Discharge    Discharge Rx:   New Prescriptions    KETOROLAC (TORADOL)

## 2025-01-12 NOTE — DISCHARGE INSTRUCTIONS
Try stretching exercises, combination of Toradol for next 5 days, Tylenol 1 g 3x/day, warm compress and Lyrica for pain relief. Can supplement with Tizanidine for sensation of muscle spasms. Consider seeing a Physical Therapist for guided exercises and assessment of your progress.

## 2025-04-01 ENCOUNTER — APPOINTMENT (OUTPATIENT)
Facility: HOSPITAL | Age: 50
End: 2025-04-01
Payer: MEDICAID

## 2025-04-01 ENCOUNTER — HOSPITAL ENCOUNTER (EMERGENCY)
Facility: HOSPITAL | Age: 50
Discharge: HOME | End: 2025-04-01
Attending: EMERGENCY MEDICINE
Payer: MEDICAID

## 2025-04-01 VITALS
BODY MASS INDEX: 32.21 KG/M2 | OXYGEN SATURATION: 97 % | DIASTOLIC BLOOD PRESSURE: 94 MMHG | WEIGHT: 225 LBS | RESPIRATION RATE: 17 BRPM | HEART RATE: 74 BPM | TEMPERATURE: 97.8 F | HEIGHT: 70 IN | SYSTOLIC BLOOD PRESSURE: 129 MMHG

## 2025-04-01 DIAGNOSIS — R07.9 ACUTE CHEST PAIN: Primary | ICD-10-CM

## 2025-04-01 DIAGNOSIS — E87.6 ACUTE HYPOKALEMIA: ICD-10-CM

## 2025-04-01 LAB
ALBUMIN SERPL-MCNC: 3.9 G/DL (ref 3.4–5)
ALBUMIN/GLOB SERPL: 0.9 (ref 0.8–1.7)
ALP SERPL-CCNC: 76 U/L (ref 45–117)
ALT SERPL-CCNC: 72 U/L (ref 16–61)
ANION GAP SERPL CALC-SCNC: 6 MMOL/L (ref 3–18)
AST SERPL-CCNC: 58 U/L (ref 10–38)
BASOPHILS # BLD: 0.02 K/UL (ref 0–0.1)
BASOPHILS NFR BLD: 0.3 % (ref 0–2)
BILIRUB SERPL-MCNC: 0.6 MG/DL (ref 0.2–1)
BUN SERPL-MCNC: 10 MG/DL (ref 7–18)
BUN/CREAT SERPL: 9 (ref 12–20)
CALCIUM SERPL-MCNC: 10.9 MG/DL (ref 8.5–10.1)
CHLORIDE SERPL-SCNC: 96 MMOL/L (ref 100–111)
CO2 SERPL-SCNC: 32 MMOL/L (ref 21–32)
CREAT SERPL-MCNC: 1.13 MG/DL (ref 0.6–1.3)
D DIMER PPP FEU-MCNC: <0.27 UG/ML(FEU)
DIFFERENTIAL METHOD BLD: ABNORMAL
EKG ATRIAL RATE: 104 BPM
EKG DIAGNOSIS: NORMAL
EKG P AXIS: 69 DEGREES
EKG P-R INTERVAL: 158 MS
EKG Q-T INTERVAL: 358 MS
EKG QRS DURATION: 80 MS
EKG QTC CALCULATION (BAZETT): 470 MS
EKG R AXIS: 32 DEGREES
EKG T AXIS: 33 DEGREES
EKG VENTRICULAR RATE: 104 BPM
EOSINOPHIL # BLD: 0.07 K/UL (ref 0–0.4)
EOSINOPHIL NFR BLD: 0.9 % (ref 0–5)
ERYTHROCYTE [DISTWIDTH] IN BLOOD BY AUTOMATED COUNT: 13.2 % (ref 11.6–14.5)
GLOBULIN SER CALC-MCNC: 4.5 G/DL (ref 2–4)
GLUCOSE SERPL-MCNC: 91 MG/DL (ref 74–99)
HCT VFR BLD AUTO: 45.1 % (ref 36–48)
HGB BLD-MCNC: 14.8 G/DL (ref 13–16)
IMM GRANULOCYTES # BLD AUTO: 0.03 K/UL (ref 0–0.04)
IMM GRANULOCYTES NFR BLD AUTO: 0.4 % (ref 0–0.5)
LYMPHOCYTES # BLD: 3.26 K/UL (ref 0.9–3.6)
LYMPHOCYTES NFR BLD: 42.2 % (ref 21–52)
MCH RBC QN AUTO: 27.2 PG (ref 24–34)
MCHC RBC AUTO-ENTMCNC: 32.8 G/DL (ref 31–37)
MCV RBC AUTO: 82.8 FL (ref 78–100)
MONOCYTES # BLD: 0.63 K/UL (ref 0.05–1.2)
MONOCYTES NFR BLD: 8.2 % (ref 3–10)
NEUTS SEG # BLD: 3.72 K/UL (ref 1.8–8)
NEUTS SEG NFR BLD: 48 % (ref 40–73)
NRBC # BLD: 0 K/UL (ref 0–0.01)
NRBC BLD-RTO: 0 PER 100 WBC
PLATELET # BLD AUTO: 239 K/UL (ref 135–420)
PMV BLD AUTO: 8.6 FL (ref 9.2–11.8)
POTASSIUM SERPL-SCNC: 3.1 MMOL/L (ref 3.5–5.5)
PROT SERPL-MCNC: 8.4 G/DL (ref 6.4–8.2)
RBC # BLD AUTO: 5.45 M/UL (ref 4.35–5.65)
SODIUM SERPL-SCNC: 134 MMOL/L (ref 136–145)
TROPONIN I SERPL HS-MCNC: 3 NG/L (ref 0–78)
TROPONIN I SERPL HS-MCNC: 5 NG/L (ref 0–78)
WBC # BLD AUTO: 7.7 K/UL (ref 4.6–13.2)

## 2025-04-01 PROCEDURE — 84484 ASSAY OF TROPONIN QUANT: CPT

## 2025-04-01 PROCEDURE — 80053 COMPREHEN METABOLIC PANEL: CPT

## 2025-04-01 PROCEDURE — 99285 EMERGENCY DEPT VISIT HI MDM: CPT

## 2025-04-01 PROCEDURE — 93010 ELECTROCARDIOGRAM REPORT: CPT | Performed by: INTERNAL MEDICINE

## 2025-04-01 PROCEDURE — 85379 FIBRIN DEGRADATION QUANT: CPT

## 2025-04-01 PROCEDURE — 6370000000 HC RX 637 (ALT 250 FOR IP): Performed by: EMERGENCY MEDICINE

## 2025-04-01 PROCEDURE — 93005 ELECTROCARDIOGRAM TRACING: CPT | Performed by: EMERGENCY MEDICINE

## 2025-04-01 PROCEDURE — 71046 X-RAY EXAM CHEST 2 VIEWS: CPT

## 2025-04-01 PROCEDURE — 85025 COMPLETE CBC W/AUTO DIFF WBC: CPT

## 2025-04-01 RX ORDER — NITROGLYCERIN 0.4 MG/1
0.4 TABLET SUBLINGUAL EVERY 5 MIN PRN
Qty: 12 TABLET | Refills: 1 | Status: SHIPPED | OUTPATIENT
Start: 2025-04-01

## 2025-04-01 RX ORDER — LIDOCAINE HYDROCHLORIDE 20 MG/ML
10 SOLUTION OROPHARYNGEAL 3 TIMES DAILY PRN
Qty: 100 ML | Refills: 0 | Status: SHIPPED | OUTPATIENT
Start: 2025-04-01

## 2025-04-01 RX ORDER — OMEPRAZOLE 20 MG/1
20 CAPSULE, DELAYED RELEASE ORAL DAILY
Qty: 14 CAPSULE | Refills: 0 | Status: SHIPPED | OUTPATIENT
Start: 2025-04-01 | End: 2025-04-15

## 2025-04-01 RX ORDER — MAG HYDROX/ALUMINUM HYD/SIMETH 400-400-40
15 SUSPENSION, ORAL (FINAL DOSE FORM) ORAL EVERY 6 HOURS PRN
Qty: 355 ML | Refills: 0 | Status: SHIPPED | OUTPATIENT
Start: 2025-04-01

## 2025-04-01 RX ADMIN — POTASSIUM BICARBONATE 40 MEQ: 782 TABLET, EFFERVESCENT ORAL at 06:13

## 2025-04-01 RX ADMIN — LIDOCAINE HYDROCHLORIDE 40 ML: 20 SOLUTION ORAL at 04:25

## 2025-04-01 ASSESSMENT — PAIN SCALES - GENERAL: PAINLEVEL_OUTOF10: 7

## 2025-04-01 ASSESSMENT — PAIN - FUNCTIONAL ASSESSMENT: PAIN_FUNCTIONAL_ASSESSMENT: 0-10

## 2025-04-01 ASSESSMENT — LIFESTYLE VARIABLES
HOW MANY STANDARD DRINKS CONTAINING ALCOHOL DO YOU HAVE ON A TYPICAL DAY: 3 OR 4
HOW OFTEN DO YOU HAVE A DRINK CONTAINING ALCOHOL: 2-3 TIMES A WEEK

## 2025-04-01 NOTE — ED PROVIDER NOTES
EMERGENCY DEPARTMENT HISTORY AND PHYSICAL EXAM      Date: 4/1/2025  Patient Name: Alen Gruber      History of Presenting Illness     Chief Complaint   Patient presents with    Chest Pain       Location/Duration/Severity/Modifying factors   Chief Complaint   Patient presents with    Chest Pain       HPI:  Alen Gruber is a 49 y.o. male with PMH significant for hypertension, hypercholesterolemia, presents with acute onset central chest discomfort that started yesterday morning after he ate a hot dog.  Seem to improve throughout the day and evening then returned tonight few hours ago.  Did not notice the pain with ambulation or while lying flat.  Feels like a bloating sensation as if gas is trapped in his esophagus.  Feel mildly short of breath but not with exertion.  Denies associated cough, fever, leg pain or swelling.  Denies prior history of heart disease.  Denies recent surgery, prior history of blood clot.    PCP: Esdras Redd APRN - NP    No current facility-administered medications for this encounter.     Current Outpatient Medications   Medication Sig Dispense Refill    lidocaine viscous hcl (XYLOCAINE) 2 % SOLN solution Take 10 mLs by mouth 3 times daily as needed for Pain (Mix with the Maalox for acute upper abdominal pain) 100 mL 0    aluminum & magnesium hydroxide-simethicone (MAALOX MAX) 400-400-40 MG/5ML SUSP Take 15 mLs by mouth every 6 hours as needed (upper abdominal pain) 355 mL 0    nitroGLYCERIN (NITROSTAT) 0.4 MG SL tablet Place 1 tablet under the tongue every 5 minutes as needed for Chest pain (Can line for chest pain if the Maalox, lidocaine combination is on effective) up to max of 3 total doses. If no relief after 1 dose, call 911. 12 tablet 1    omeprazole (PRILOSEC) 20 MG delayed release capsule Take 1 capsule by mouth Daily for 14 days 14 capsule 0    ketorolac (TORADOL) 10 MG tablet Take 1 tablet by mouth every 6 hours as needed for Pain 20 tablet 0    pregabalin (LYRICA) 75

## 2025-04-01 NOTE — DISCHARGE INSTRUCTIONS
Eat more bananas, fruit containing potassium.  Can also take a daily potassium supplement, 20 mill equivalents of potassium chloride daily for the next 2 weeks and have your potassium level rechecked

## (undated) DEVICE — Device

## (undated) DEVICE — MEDI-VAC NON-CONDUCTIVE SUCTION TUBING: Brand: CARDINAL HEALTH

## (undated) DEVICE — AIRLIFE™ NASAL OXYGEN CANNULA CURVED, NONFLARED TIP WITH 14 FOOT (4.3 M) CRUSH-RESISTANT TUBING, OVER-THE-EAR STYLE: Brand: AIRLIFE™

## (undated) DEVICE — CATH IV SAFE STR 22GX1IN BLU -- PROTECTIV PLUS

## (undated) DEVICE — DISPOSABLE DISTAL ATTACHMENT: Brand: DISPOSABLE DISTAL ATTACHMENT

## (undated) DEVICE — FLEX ADVANTAGE 1500CC: Brand: FLEX ADVANTAGE